# Patient Record
Sex: FEMALE | Race: BLACK OR AFRICAN AMERICAN | NOT HISPANIC OR LATINO | Employment: OTHER | URBAN - METROPOLITAN AREA
[De-identification: names, ages, dates, MRNs, and addresses within clinical notes are randomized per-mention and may not be internally consistent; named-entity substitution may affect disease eponyms.]

---

## 2017-08-04 ENCOUNTER — PATIENT OUTREACH (OUTPATIENT)
Dept: ADMINISTRATIVE | Facility: HOSPITAL | Age: 62
End: 2017-08-04

## 2017-08-04 NOTE — LETTER
August 4, 2017    Kellie Beckett  [address]  [place] [address]             [facility]  [address]  [facility] [address]  Phone: [phone] Dear Kellie Beckett     In the spirit of maintaining your good health, our system indicates that you have not been seen in the office in over 12 months and due for a visit.     Our system indicates that you are due for the following:   Health Maintenance Due   Topic Date Due    Zoster Vaccine  02/12/2015    Mammogram  09/12/2015    Pap Smear with HPV Cotest  07/31/2016    Influenza Vaccine  08/01/2017       Shawna Angel MD would like you to schedule an appointment for an annual exam at your earliest convenience. If you have completed any of these health maintenance requirements at an outside facility, please contact our office so we may update your health record.    If your PRIMARY CARE PHSICIAN has changed please contact your insurance company to reflect the correct physician.    If you have any issues or need assistance in scheduling this appointment, please call. Thank you for choosing [facility] for all your health care needs.     Pretty PATEL LPN Care Coordinator  [facility]  [phone]

## 2019-05-28 ENCOUNTER — PATIENT OUTREACH (OUTPATIENT)
Dept: ADMINISTRATIVE | Facility: HOSPITAL | Age: 64
End: 2019-05-28

## 2019-06-11 ENCOUNTER — OFFICE VISIT (OUTPATIENT)
Dept: INTERNAL MEDICINE | Facility: CLINIC | Age: 64
End: 2019-06-11
Payer: COMMERCIAL

## 2019-06-11 VITALS
SYSTOLIC BLOOD PRESSURE: 135 MMHG | HEART RATE: 81 BPM | OXYGEN SATURATION: 98 % | BODY MASS INDEX: 49.01 KG/M2 | TEMPERATURE: 98 F | DIASTOLIC BLOOD PRESSURE: 82 MMHG | WEIGHT: 285.5 LBS

## 2019-06-11 DIAGNOSIS — D64.9 ANEMIA, UNSPECIFIED TYPE: ICD-10-CM

## 2019-06-11 DIAGNOSIS — I10 HYPERTENSION, UNSPECIFIED TYPE: ICD-10-CM

## 2019-06-11 DIAGNOSIS — Z00.00 ROUTINE GENERAL MEDICAL EXAMINATION AT A HEALTH CARE FACILITY: Primary | ICD-10-CM

## 2019-06-11 DIAGNOSIS — E66.01 MORBID OBESITY WITH BMI OF 40.0-44.9, ADULT: ICD-10-CM

## 2019-06-11 DIAGNOSIS — H40.1190 CHRONIC OPEN ANGLE GLAUCOMA, UNSPECIFIED GLAUCOMA STAGE, UNSPECIFIED LATERALITY: ICD-10-CM

## 2019-06-11 DIAGNOSIS — Z98.84 HISTORY OF GASTRIC BYPASS: ICD-10-CM

## 2019-06-11 PROCEDURE — 3079F PR MOST RECENT DIASTOLIC BLOOD PRESSURE 80-89 MM HG: ICD-10-PCS | Mod: CPTII,S$GLB,, | Performed by: INTERNAL MEDICINE

## 2019-06-11 PROCEDURE — 3075F SYST BP GE 130 - 139MM HG: CPT | Mod: CPTII,S$GLB,, | Performed by: INTERNAL MEDICINE

## 2019-06-11 PROCEDURE — 99386 PREV VISIT NEW AGE 40-64: CPT | Mod: S$GLB,,, | Performed by: INTERNAL MEDICINE

## 2019-06-11 PROCEDURE — 99386 PR PREVENTIVE VISIT,NEW,40-64: ICD-10-PCS | Mod: S$GLB,,, | Performed by: INTERNAL MEDICINE

## 2019-06-11 PROCEDURE — 99999 PR PBB SHADOW E&M-EST. PATIENT-LVL IV: ICD-10-PCS | Mod: PBBFAC,,, | Performed by: INTERNAL MEDICINE

## 2019-06-11 PROCEDURE — 99999 PR PBB SHADOW E&M-EST. PATIENT-LVL IV: CPT | Mod: PBBFAC,,, | Performed by: INTERNAL MEDICINE

## 2019-06-11 PROCEDURE — 3079F DIAST BP 80-89 MM HG: CPT | Mod: CPTII,S$GLB,, | Performed by: INTERNAL MEDICINE

## 2019-06-11 PROCEDURE — 3075F PR MOST RECENT SYSTOLIC BLOOD PRESS GE 130-139MM HG: ICD-10-PCS | Mod: CPTII,S$GLB,, | Performed by: INTERNAL MEDICINE

## 2019-06-11 RX ORDER — GLUC/MSM/COLGN2/HYAL/ANTIARTH3 375-375-20
1 TABLET ORAL 2 TIMES DAILY
COMMUNITY
Start: 2015-11-04 | End: 2019-12-05

## 2019-06-11 RX ORDER — BIOTIN 1 MG
1000 TABLET ORAL
COMMUNITY
End: 2019-06-11

## 2019-06-11 RX ORDER — HYDROCHLOROTHIAZIDE 12.5 MG/1
TABLET ORAL
Qty: 90 TABLET | Refills: 3 | Status: SHIPPED | OUTPATIENT
Start: 2019-06-11 | End: 2019-12-05

## 2019-06-11 RX ORDER — MAGNESIUM GLUCONATE 27.5 (500)
500 TABLET ORAL
COMMUNITY
End: 2019-06-11

## 2019-06-11 RX ORDER — HYDROCHLOROTHIAZIDE 12.5 MG/1
TABLET ORAL
Refills: 1 | COMMUNITY
Start: 2019-04-25 | End: 2019-06-11 | Stop reason: SDUPTHER

## 2019-06-11 RX ORDER — MAGNESIUM GLUCONATE 27.5 (500)
1 TABLET ORAL DAILY
COMMUNITY
End: 2020-03-12

## 2019-06-13 ENCOUNTER — LAB VISIT (OUTPATIENT)
Dept: LAB | Facility: HOSPITAL | Age: 64
End: 2019-06-13
Attending: INTERNAL MEDICINE
Payer: COMMERCIAL

## 2019-06-13 DIAGNOSIS — Z00.00 ROUTINE GENERAL MEDICAL EXAMINATION AT A HEALTH CARE FACILITY: ICD-10-CM

## 2019-06-13 LAB
25(OH)D3+25(OH)D2 SERPL-MCNC: 30 NG/ML (ref 30–96)
ALBUMIN SERPL BCP-MCNC: 3.6 G/DL (ref 3.5–5.2)
ALP SERPL-CCNC: 125 U/L (ref 55–135)
ALT SERPL W/O P-5'-P-CCNC: 22 U/L (ref 10–44)
ANION GAP SERPL CALC-SCNC: 10 MMOL/L (ref 8–16)
AST SERPL-CCNC: 20 U/L (ref 10–40)
BASOPHILS # BLD AUTO: 0.05 K/UL (ref 0–0.2)
BASOPHILS NFR BLD: 0.8 % (ref 0–1.9)
BILIRUB SERPL-MCNC: 0.5 MG/DL (ref 0.1–1)
BUN SERPL-MCNC: 21 MG/DL (ref 8–23)
CALCIUM SERPL-MCNC: 10 MG/DL (ref 8.7–10.5)
CHLORIDE SERPL-SCNC: 101 MMOL/L (ref 95–110)
CHOLEST SERPL-MCNC: 183 MG/DL (ref 120–199)
CHOLEST/HDLC SERPL: 1.9 {RATIO} (ref 2–5)
CO2 SERPL-SCNC: 28 MMOL/L (ref 23–29)
CREAT SERPL-MCNC: 0.7 MG/DL (ref 0.5–1.4)
DIFFERENTIAL METHOD: ABNORMAL
EOSINOPHIL # BLD AUTO: 0.2 K/UL (ref 0–0.5)
EOSINOPHIL NFR BLD: 3.6 % (ref 0–8)
ERYTHROCYTE [DISTWIDTH] IN BLOOD BY AUTOMATED COUNT: 12.4 % (ref 11.5–14.5)
EST. GFR  (AFRICAN AMERICAN): >60 ML/MIN/1.73 M^2
EST. GFR  (NON AFRICAN AMERICAN): >60 ML/MIN/1.73 M^2
GLUCOSE SERPL-MCNC: 91 MG/DL (ref 70–110)
HCT VFR BLD AUTO: 40.3 % (ref 37–48.5)
HDLC SERPL-MCNC: 95 MG/DL (ref 40–75)
HDLC SERPL: 51.9 % (ref 20–50)
HGB BLD-MCNC: 12.3 G/DL (ref 12–16)
IMM GRANULOCYTES # BLD AUTO: 0.02 K/UL (ref 0–0.04)
IMM GRANULOCYTES NFR BLD AUTO: 0.3 % (ref 0–0.5)
LDLC SERPL CALC-MCNC: 78.4 MG/DL (ref 63–159)
LYMPHOCYTES # BLD AUTO: 0.8 K/UL (ref 1–4.8)
LYMPHOCYTES NFR BLD: 12.6 % (ref 18–48)
MCH RBC QN AUTO: 29.1 PG (ref 27–31)
MCHC RBC AUTO-ENTMCNC: 30.5 G/DL (ref 32–36)
MCV RBC AUTO: 95 FL (ref 82–98)
MONOCYTES # BLD AUTO: 0.6 K/UL (ref 0.3–1)
MONOCYTES NFR BLD: 9.5 % (ref 4–15)
NEUTROPHILS # BLD AUTO: 4.6 K/UL (ref 1.8–7.7)
NEUTROPHILS NFR BLD: 73.2 % (ref 38–73)
NONHDLC SERPL-MCNC: 88 MG/DL
NRBC BLD-RTO: 0 /100 WBC
PLATELET # BLD AUTO: 357 K/UL (ref 150–350)
PMV BLD AUTO: 10.9 FL (ref 9.2–12.9)
POTASSIUM SERPL-SCNC: 4.2 MMOL/L (ref 3.5–5.1)
PROT SERPL-MCNC: 7.6 G/DL (ref 6–8.4)
RBC # BLD AUTO: 4.23 M/UL (ref 4–5.4)
SODIUM SERPL-SCNC: 139 MMOL/L (ref 136–145)
TRIGL SERPL-MCNC: 48 MG/DL (ref 30–150)
TSH SERPL DL<=0.005 MIU/L-ACNC: 0.79 UIU/ML (ref 0.4–4)
VIT B12 SERPL-MCNC: 1411 PG/ML (ref 210–950)
WBC # BLD AUTO: 6.34 K/UL (ref 3.9–12.7)

## 2019-06-13 PROCEDURE — 84443 ASSAY THYROID STIM HORMONE: CPT

## 2019-06-13 PROCEDURE — 85025 COMPLETE CBC W/AUTO DIFF WBC: CPT

## 2019-06-13 PROCEDURE — 83036 HEMOGLOBIN GLYCOSYLATED A1C: CPT

## 2019-06-13 PROCEDURE — 82607 VITAMIN B-12: CPT

## 2019-06-13 PROCEDURE — 36415 COLL VENOUS BLD VENIPUNCTURE: CPT

## 2019-06-13 PROCEDURE — 82306 VITAMIN D 25 HYDROXY: CPT

## 2019-06-13 PROCEDURE — 80053 COMPREHEN METABOLIC PANEL: CPT

## 2019-06-13 PROCEDURE — 80061 LIPID PANEL: CPT

## 2019-06-13 NOTE — PROGRESS NOTES
Subjective:      Patient ID: Kellie Beckett is a 64 y.o. female.    Chief Complaint: Establish Care    HPI   65 yo with   Patient Active Problem List   Diagnosis    Anemia    Hypertension    COAG (chronic open-angle glaucoma)    Morbid obesity with BMI of 40.0-44.9, adult    History of gastric bypass     Past Medical History:   Diagnosis Date    Anemia     iron and b12 deficiency    Glaucoma     Hypertension      Here today for annual prev exam.  Compliant with meds without significant side effects. Energy and appetite are good.     Past Surgical History:   Procedure Laterality Date    GASTRIC BYPASS  2004    GLAUCOMA SURGERY      right eye     Social History     Socioeconomic History    Marital status:      Spouse name: Not on file    Number of children: 0    Years of education: Not on file    Highest education level: Not on file   Occupational History    Not on file   Social Needs    Financial resource strain: Not hard at all    Food insecurity:     Worry: Never true     Inability: Never true    Transportation needs:     Medical: No     Non-medical: No   Tobacco Use    Smoking status: Never Smoker   Substance and Sexual Activity    Alcohol use: No     Frequency: Never     Binge frequency: Never    Drug use: No    Sexual activity: Never   Lifestyle    Physical activity:     Days per week: 0 days     Minutes per session: Not on file    Stress: Not at all   Relationships    Social connections:     Talks on phone: More than three times a week     Gets together: More than three times a week     Attends [employer] service: Not on file     Active member of club or organization: Yes     Attends meetings of clubs or organizations: More than 4 times per year     Relationship status:    Other Topics Concern    Not on file   Social History Narrative    Live alone     family history includes Cancer in her maternal aunt and mother; Cataracts in her father and mother; Diabetes in her  father; Glaucoma in her father; Heart disease in her father; Hypertension in her father.    Review of Systems   Constitutional: Negative for activity change and unexpected weight change.   HENT: Negative for hearing loss, rhinorrhea and trouble swallowing.    Eyes: Negative for discharge and visual disturbance.   Respiratory: Negative for chest tightness and wheezing.    Cardiovascular: Negative for chest pain and palpitations.   Gastrointestinal: Negative for blood in stool, constipation, diarrhea and vomiting.   Endocrine: Negative for polydipsia and polyuria.   Genitourinary: Negative for difficulty urinating, dysuria, hematuria and menstrual problem.   Musculoskeletal: Positive for arthralgias. Negative for joint swelling and neck pain.   Neurological: Negative for headaches.   Psychiatric/Behavioral: Negative for confusion and dysphoric mood.     Objective:   /82 (BP Location: Left arm, Patient Position: Sitting, BP Method: Large (Automatic))   Pulse 81   Temp 98.1 °F (36.7 °C) (Tympanic)   Wt 129.5 kg (285 lb 7.9 oz)   SpO2 98%   BMI 49.01 kg/m²     Physical Exam   Constitutional: She is oriented to person, place, and time. She appears well-developed and well-nourished. No distress.   HENT:   Head: Normocephalic and atraumatic.   Mouth/Throat: Oropharynx is clear and moist.   Eyes: Pupils are equal, round, and reactive to light. EOM are normal.   Neck: Neck supple. No thyromegaly present.   Cardiovascular: Normal rate and regular rhythm.   Pulmonary/Chest: Breath sounds normal. She has no wheezes. She has no rales.   Abdominal: Soft. Bowel sounds are normal. There is no tenderness.   Musculoskeletal: She exhibits no edema.   Lymphadenopathy:     She has no cervical adenopathy.   Neurological: She is alert and oriented to person, place, and time.   Skin: Skin is warm and dry.   Psychiatric: She has a normal mood and affect. Her behavior is normal.       Assessment:     1. Routine general medical  examination at a health care facility    2. History of gastric bypass    3. Hypertension, unspecified type    4. Morbid obesity with BMI of 40.0-44.9, adult    5. Chronic open angle glaucoma, unspecified glaucoma stage, unspecified laterality    6. Anemia, unspecified type      Plan:   Routine general medical examination at a health care facility  -     Comprehensive metabolic panel; Future; Expected date: 06/11/2019  -     CBC auto differential; Future; Expected date: 06/11/2019  -     TSH; Future; Expected date: 06/11/2019  -     Lipid panel; Future; Expected date: 06/11/2019  -     Vitamin D; Future; Expected date: 06/11/2019  -     Hemoglobin A1c; Future; Expected date: 06/11/2019  -     hydroCHLOROthiazide (HYDRODIURIL) 12.5 MG Tab; TK 1 T PO QD IN THE MORNING  Dispense: 90 tablet; Refill: 3  -     Ambulatory Referral to Gynecology  -     Vitamin B12; Future; Expected date: 06/11/2019  Heart healthy diet and reg exercise  HM reviewed    History of gastric bypass  Check above. Cont vit b12    Hypertension, unspecified type  Controlled  Cont current med    Morbid obesity with BMI of 40.0-44.9, adult  D and e as discussed  Chronic open angle glaucoma, unspecified glaucoma stage, unspecified laterality    Anemia, unspecified type  Check above      Lab Frequency Next Occurrence   Comprehensive metabolic panel Once 06/11/2019   CBC auto differential Once 06/11/2019   TSH Once 06/11/2019   Lipid panel Once 06/11/2019   Vitamin D Once 06/11/2019   Hemoglobin A1c Once 06/11/2019   Vitamin B12 Once 06/11/2019       Problem List Items Addressed This Visit        Ophtho    COAG (chronic open-angle glaucoma)       Cardiac/Vascular    Hypertension       Oncology    Anemia    Overview     iron and b12 deficiency  Pt reports on b12 since gastric bypass, but no h/o actual low b12 reading            Endocrine    Morbid obesity with BMI of 40.0-44.9, adult    History of gastric bypass      Other Visit Diagnoses     Routine  general medical examination at a health care facility    -  Primary    Relevant Medications    hydroCHLOROthiazide (HYDRODIURIL) 12.5 MG Tab    Other Relevant Orders    Comprehensive metabolic panel    CBC auto differential    TSH    Lipid panel    Vitamin D    Hemoglobin A1c    Ambulatory Referral to Gynecology    Vitamin B12          Follow up in about 6 months (around 12/11/2019), or if symptoms worsen or fail to improve.

## 2019-06-14 LAB
ESTIMATED AVG GLUCOSE: 97 MG/DL (ref 68–131)
HBA1C MFR BLD HPLC: 5 % (ref 4–5.6)

## 2019-06-17 ENCOUNTER — TELEPHONE (OUTPATIENT)
Dept: INTERNAL MEDICINE | Facility: CLINIC | Age: 64
End: 2019-06-17

## 2019-06-17 NOTE — TELEPHONE ENCOUNTER
Conveyed results and recommendation to pt who verbalized understanding and scheduled appt with Dr. Persaud for 6/18/19.

## 2019-06-17 NOTE — TELEPHONE ENCOUNTER
Cholesterol and other risk factors indicate an elevated risk of cardiovascular disease. Please ask patient to see me in clinic to discuss further testing versus appropriate treatment.  Dr. Persaud

## 2019-06-18 ENCOUNTER — OFFICE VISIT (OUTPATIENT)
Dept: INTERNAL MEDICINE | Facility: CLINIC | Age: 64
End: 2019-06-18
Payer: COMMERCIAL

## 2019-06-18 VITALS
DIASTOLIC BLOOD PRESSURE: 88 MMHG | WEIGHT: 285.06 LBS | SYSTOLIC BLOOD PRESSURE: 128 MMHG | OXYGEN SATURATION: 98 % | BODY MASS INDEX: 48.93 KG/M2 | TEMPERATURE: 97 F | HEART RATE: 74 BPM

## 2019-06-18 DIAGNOSIS — M25.552 CHRONIC LEFT HIP PAIN: ICD-10-CM

## 2019-06-18 DIAGNOSIS — G89.29 CHRONIC LEFT HIP PAIN: ICD-10-CM

## 2019-06-18 DIAGNOSIS — G89.29 CHRONIC PAIN OF BOTH KNEES: ICD-10-CM

## 2019-06-18 DIAGNOSIS — Z91.89 AT RISK FOR CORONARY ARTERY DISEASE: Primary | ICD-10-CM

## 2019-06-18 DIAGNOSIS — M25.562 CHRONIC PAIN OF BOTH KNEES: ICD-10-CM

## 2019-06-18 DIAGNOSIS — M25.561 CHRONIC PAIN OF BOTH KNEES: ICD-10-CM

## 2019-06-18 PROCEDURE — 99999 PR PBB SHADOW E&M-EST. PATIENT-LVL IV: ICD-10-PCS | Mod: PBBFAC,,, | Performed by: INTERNAL MEDICINE

## 2019-06-18 PROCEDURE — 3074F SYST BP LT 130 MM HG: CPT | Mod: CPTII,S$GLB,, | Performed by: INTERNAL MEDICINE

## 2019-06-18 PROCEDURE — 99999 PR PBB SHADOW E&M-EST. PATIENT-LVL IV: CPT | Mod: PBBFAC,,, | Performed by: INTERNAL MEDICINE

## 2019-06-18 PROCEDURE — 3079F DIAST BP 80-89 MM HG: CPT | Mod: CPTII,S$GLB,, | Performed by: INTERNAL MEDICINE

## 2019-06-18 PROCEDURE — 99213 OFFICE O/P EST LOW 20 MIN: CPT | Mod: S$GLB,,, | Performed by: INTERNAL MEDICINE

## 2019-06-18 PROCEDURE — 3008F BODY MASS INDEX DOCD: CPT | Mod: CPTII,S$GLB,, | Performed by: INTERNAL MEDICINE

## 2019-06-18 PROCEDURE — 99213 PR OFFICE/OUTPT VISIT, EST, LEVL III, 20-29 MIN: ICD-10-PCS | Mod: S$GLB,,, | Performed by: INTERNAL MEDICINE

## 2019-06-18 PROCEDURE — 3079F PR MOST RECENT DIASTOLIC BLOOD PRESSURE 80-89 MM HG: ICD-10-PCS | Mod: CPTII,S$GLB,, | Performed by: INTERNAL MEDICINE

## 2019-06-18 PROCEDURE — 3074F PR MOST RECENT SYSTOLIC BLOOD PRESSURE < 130 MM HG: ICD-10-PCS | Mod: CPTII,S$GLB,, | Performed by: INTERNAL MEDICINE

## 2019-06-18 PROCEDURE — 3008F PR BODY MASS INDEX (BMI) DOCUMENTED: ICD-10-PCS | Mod: CPTII,S$GLB,, | Performed by: INTERNAL MEDICINE

## 2019-06-18 RX ORDER — NAPROXEN SODIUM 220 MG
220 TABLET ORAL
COMMUNITY

## 2019-06-18 RX ORDER — ACETAMINOPHEN 325 MG/1
325 TABLET ORAL
COMMUNITY

## 2019-06-18 NOTE — PROGRESS NOTES
Subjective:      Patient ID: Kellie Beckett is a 64 y.o. female.    Chief Complaint: discuss lab results    HPI     63 yo with   Patient Active Problem List   Diagnosis    Anemia    Hypertension    COAG (chronic open-angle glaucoma)    Morbid obesity with BMI of 40.0-44.9, adult    History of gastric bypass    At risk for coronary artery disease     Past Medical History:   Diagnosis Date    Anemia     iron and b12 deficiency    Glaucoma     Hypertension      Here today for follow-up on lab results and management of elevated cardiovascular disease risk.  This is a new issue for her.  She has not had previous medications for this.  She also reports that she has had chronic hip and knee pain.  She is trying to avoid surgery.  She has had physical therapy which has improved in the past.  She is wondering about water therapy.    efamily history includes Cancer in her maternal aunt and mother; Cataracts in her father and mother; Diabetes in her father; Glaucoma in her father; Heart disease in her father; Hypertension in her father.    Review of Systems   Constitutional: Negative for activity change and unexpected weight change.   HENT: Negative for hearing loss, rhinorrhea and trouble swallowing.    Eyes: Negative for discharge and visual disturbance.   Respiratory: Negative for wheezing.    Cardiovascular: Negative for chest pain and palpitations.   Gastrointestinal: Negative for blood in stool, constipation, diarrhea and vomiting.   Endocrine: Negative for polydipsia and polyuria.   Genitourinary: Negative for difficulty urinating, dysuria, hematuria and menstrual problem.   Musculoskeletal: Positive for arthralgias. Negative for joint swelling and neck pain.   Neurological: Negative for weakness and headaches.   Psychiatric/Behavioral: Negative for confusion and dysphoric mood.     Objective:   /88 (BP Location: Right arm, Patient Position: Sitting, BP Method: Large (Manual))   Pulse 74   Temp 97.2  °F (36.2 °C) (Tympanic)   Wt 129.3 kg (285 lb 0.9 oz)   SpO2 98%   BMI 48.93 kg/m²     Physical Exam   Constitutional: She appears well-developed and well-nourished. No distress.   Cardiovascular: Normal rate.   Pulmonary/Chest: Effort normal.   Neurological: She is alert.   Skin: Skin is warm and dry.   Psychiatric: She has a normal mood and affect. Her behavior is normal.       Assessment:     1. At risk for coronary artery disease    2. Chronic left hip pain    3. Chronic pain of both knees      Plan:   At risk for coronary artery disease  -     CT Cardiac Scoring; Future; Expected date: 06/18/2019  Patient is agreeable to statin if indicated based on coronary artery calcium score.  Risk and benefits of statins discussed with patient including possible muscle and liver side effects.      Chronic left hip pain  -     Ambulatory Referral to Physiatry    Chronic pain of both knees  -     Ambulatory Referral to Physiatry            Problem List Items Addressed This Visit        Cardiac/Vascular    At risk for coronary artery disease - Primary    Overview     ascvd 6.8 to 7.8, 6/2019         Relevant Orders    CT Cardiac Scoring      Other Visit Diagnoses     Chronic left hip pain        Relevant Orders    Ambulatory Referral to Physiatry    Chronic pain of both knees        Relevant Orders    Ambulatory Referral to Physiatry          Follow up if symptoms worsen or fail to improve.

## 2019-06-21 ENCOUNTER — OFFICE VISIT (OUTPATIENT)
Dept: OBSTETRICS AND GYNECOLOGY | Facility: CLINIC | Age: 64
End: 2019-06-21
Payer: COMMERCIAL

## 2019-06-21 VITALS
DIASTOLIC BLOOD PRESSURE: 80 MMHG | WEIGHT: 282.19 LBS | BODY MASS INDEX: 48.18 KG/M2 | HEIGHT: 64 IN | SYSTOLIC BLOOD PRESSURE: 120 MMHG

## 2019-06-21 DIAGNOSIS — Z01.419 ENCOUNTER FOR WELL WOMAN EXAM WITH ROUTINE GYNECOLOGICAL EXAM: Primary | ICD-10-CM

## 2019-06-21 PROBLEM — Z86.2 HISTORY OF ANEMIA: Status: ACTIVE | Noted: 2017-05-09

## 2019-06-21 PROBLEM — E55.9 VITAMIN D DEFICIENCY: Status: ACTIVE | Noted: 2017-05-09

## 2019-06-21 PROBLEM — H90.3 BILATERAL SENSORINEURAL HEARING LOSS: Status: ACTIVE | Noted: 2018-06-06

## 2019-06-21 PROCEDURE — 99386 PR PREVENTIVE VISIT,NEW,40-64: ICD-10-PCS | Mod: S$GLB,,, | Performed by: OBSTETRICS & GYNECOLOGY

## 2019-06-21 PROCEDURE — 99999 PR PBB SHADOW E&M-EST. PATIENT-LVL III: ICD-10-PCS | Mod: PBBFAC,,, | Performed by: OBSTETRICS & GYNECOLOGY

## 2019-06-21 PROCEDURE — 99999 PR PBB SHADOW E&M-EST. PATIENT-LVL III: CPT | Mod: PBBFAC,,, | Performed by: OBSTETRICS & GYNECOLOGY

## 2019-06-21 PROCEDURE — 99386 PREV VISIT NEW AGE 40-64: CPT | Mod: S$GLB,,, | Performed by: OBSTETRICS & GYNECOLOGY

## 2019-06-21 NOTE — LETTER
June 21, 2019      Hunter Persaud MD  [address] [facility]  [place] [address]           [facility]  [place]  [address] [address] [place] [address]  Phone: [phone]  Fax: [phone]          Patient: Kellie Beckett   MR Number: [id number]   YOB: 1955   Date of Visit: 6/21/2019       Dear Dr. Hunter Persaud:    Thank you for referring Kellie Beckett to me for evaluation. Attached you will find relevant portions of my assessment and plan of care.    If you have questions, please do not hesitate to call me. I look forward to following Kellie Beckett along with you.    Sincerely,    Rafaela Kowalski MD    Enclosure  CC:  No Recipients    If you would like to receive this communication electronically, please contact [email or web address] or [phone] to request more information on [facility] access.    For providers and/or their staff who would like to refer a patient to [facility], please contact us through our one-stop-shop provider referral line, [facility], at [phone].    If you feel you have received this communication in error or would no longer like to receive these types of communications, please e-mail [email or web address]

## 2019-06-21 NOTE — PROGRESS NOTES
"Subjective:      Kellie Beckett is a 64 y.o. female who presents for annual exam. The patient has no complaints today. The patient is not currently sexually active. GYN screening history: last pap: approximate date 02/2018 and was normal and last mammogram: approximate date 05/2019 and was normal. The patient is not taking hormone replacement therapy. Patient denies post-menopausal vaginal bleeding.. The patient wears seatbelts: yes. The patient participates in regular exercise: yes. Has the patient ever been transfused or tattooed?: no. The patient reports that there is not domestic violence in her life.  Last pap 2018 at [facility]. Last mammogram 2018, wnl. Patient was late 40's at menopause.  13 at menarche.  Not sexually active.  +hot flashes, +night sweats, no sleep disturbances.  No vaginal dryness, no vaginal pain.  No leaking of urine.       Menstrual History:  OB History    None        Menarche age: 13  No LMP recorded. Patient is postmenopausal.           Review of Systems  Constitutional: negative  Eyes: negative  Ears, nose, mouth, throat, and face: negative  Respiratory: negative  Cardiovascular: negative  Gastrointestinal: negative  Genitourinary:negative  Integument/breast: negative  Hematologic/lymphatic: negative  Musculoskeletal:negative  Neurological: negative  Behavioral/Psych: negative  Endocrine: negative  Allergic/Immunologic: negative      Objective:      /80 (BP Location: Right arm)   Ht 5' 4" (1.626 m)   Wt 128 kg (282 lb 3 oz)   BMI 48.44 kg/m²   General appearance: alert, appears stated age and cooperative  Head: Normocephalic, without obvious abnormality, atraumatic  Eyes: negative  Nose: no discharge  Neck: no adenopathy, no carotid bruit, no JVD, supple, symmetrical, trachea midline and thyroid not enlarged, symmetric, no tenderness/mass/nodules  Lungs: clear to auscultation bilaterally  Breasts: Inspection negative, No nipple retraction or dimpling, No nipple " discharge or bleeding, No axillary or supraclavicular adenopathy, Normal to palpation without dominant masses  Heart: S1, S2 normal and no S3 or S4  Abdomen: soft, non-tender; bowel sounds normal; no masses,  no organomegaly  Pelvic: cervix normal in appearance, external genitalia normal, no adnexal masses or tenderness, no bladder tenderness, no cervical motion tenderness, perianal skin: no external genital warts noted, rectovaginal septum normal, urethra without abnormality or discharge, uterus normal size, shape, and consistency and vagina normal without discharge  Extremities: extremities normal, atraumatic, no cyanosis or edema  Skin: Skin color, texture, turgor normal. No rashes or lesions  Neurologic: Grossly normal      Assessment:      Normal gyn exam  Menopause      Plan:      All questions answered.  Discussed healthy lifestyle modifications.

## 2019-06-21 NOTE — PATIENT INSTRUCTIONS
Prevention Guidelines, Women Ages 50 to 64  Screening tests and vaccines are an important part of managing your health. Health counseling is essential, too. Below are guidelines for these, for women ages 50 to 64. Talk with your healthcare provider to make sure youre up to date on what you need.  Screening Who needs it How often   Type 2 diabetes or prediabetes All adults beginning at age 45 and adults without symptoms at any age who are overweight or obese and have 1 or more additional risk factors for diabetes. At  least every 3 years   Alcohol misuse All women in this age group At routine exams   Blood pressure All women in this age group Every 2 years if your blood pressure is less than 120/80 mm Hg; yearly if your systolic blood pressure is 120 to 139 mm Hg, or your diastolic blood pressure reading is 80 to 89 mm Hg   Breast cancer All women in this age group Yearly mammogram and clinical breast exam1   Cervical cancer All women in this age group, except women who have had a complete hysterectomy Pap test every 3 years or Pap test with human papillomavirus (HPV) test every 5 years   Chlamydia Women at increased risk for infection At routine exams   Colorectal cancer All women in this age group Flexible sigmoidoscopy every 5 years, or colonoscopy every 10 years, or double-contrast barium enema every 5 years; yearly fecal occult blood test or fecal immunochemical test; or a stool DNA test as often as your health care provider advises; talk with your health care provider about which tests are best for you   Depression All women in this age group At routine exams   Gonorrhea Sexually active women at increased risk for infection At routine exams   Hepatitis C Anyone at increased risk; 1 time for those born between 1945 and 1965 At routine exams   High cholesterol or triglycerides All women in this age group who are at risk for coronary artery disease At least every 5 years   HIV All women At routine exams   Lung  cancer Adults age 55 to 80 who have smoked Yearly screening in smokers with 30 pack-year history of smoking or who quit within 15 years   Obesity All women in this age group At routine exams   Osteoporosis Women who are postmenopausal Ask your healthcare provider   Syphilis Women at increased risk for infection - talk with your healthcare provider At routine exams   Tuberculosis Women at increased risk for infection - talk with your healthcare provider Ask your healthcare provider   Vision All women in this age group Ask your healthcare provider   Vaccine Who needs it How often   Chickenpox (varicella) All women in this age group who have no record of this infection or vaccine 2 doses; the second dose should be given at least 4 weeks after the first dose   Hepatitis A Women at increased risk for infection - talk with your healthcare provider 2 doses given at least 6 months apart   Hepatitis B Women at increased risk for infection - talk with your healthcare provider 3 doses over 6 months; second dose should be given 1 month after the first dose; the third dose should be given at least 2 months after the second dose and at least 4 months after the first dose   Haemophilus influenzaeType B (HIB) Women at increased risk for infection - talk with your healthcare provider 1 to 3 doses   Influenza (flu) All women in this age group Once a year   Measles, mumps, rubella (MMR) Women in this age group through their late 50s who have no record of these infections or vaccines 1 dose   Meningococcal Women at increased risk for infection - talk with your healthcare provider 1 or more doses   Pneumococcal conjugate vaccine (PCV13) and pneumococcal polysaccharide vaccine (PPSV23) Women at increased risk for infection - talk with your healthcare provider PCV13: 1 dose ages 19 to 65 (protects against 13 types of pneumococcal bacteria)  PPSV23: 1 to 2 doses through age 64, or 1 dose at 65 or older (protects against 23 types of  pneumococcal bacteria)   Tetanus/diphtheria/pertussis (Td/Tdap) booster All women in this age group Td every 10 years, or a one-time dose of Tdap instead of a Td booster after age 18, then Td every 10 years   Zoster All women ages 60 and older 1 dose   Counseling Who needs it How often   BRCA gene mutation testing for breast and ovarian cancer susceptibility Women with increased risk for having gene mutation When your risk is known   Breast cancer and chemoprevention Women at high risk for breast cancer When your risk is known   Diet and exercise Women who are overweight or obese When diagnosed, and then at routine exams   Sexually transmitted infection prevention Women at increased risk for infection - talk with your healthcare provider At routine exams   Use of daily aspirin Women ages 55 and up in this age group who are at risk for cardiovascular health problems such as stroke When your risk is known   Use of tobacco and the health effects it can cause All women in this age group Every exam   1American Cancer Society  Date Last Reviewed: 1/26/2016  © 4804-1580 [employer]. [address] [place] [address], [place] [address]. All rights reserved. This information is not intended as a substitute for professional medical care. Always follow your healthcare professional's instructions.

## 2019-06-27 ENCOUNTER — TELEPHONE (OUTPATIENT)
Dept: RADIOLOGY | Facility: HOSPITAL | Age: 64
End: 2019-06-27

## 2019-06-28 ENCOUNTER — HOSPITAL ENCOUNTER (OUTPATIENT)
Dept: RADIOLOGY | Facility: HOSPITAL | Age: 64
Discharge: HOME | End: 2019-06-28
Attending: INTERNAL MEDICINE
Payer: COMMERCIAL

## 2019-06-28 DIAGNOSIS — Z91.89 AT RISK FOR CORONARY ARTERY DISEASE: ICD-10-CM

## 2019-06-28 PROCEDURE — 75571 CT HRT W/O DYE W/CA TEST: CPT | Mod: 26,,, | Performed by: RADIOLOGY

## 2019-06-28 PROCEDURE — 75571 CT HRT W/O DYE W/CA TEST: CPT | Mod: TC

## 2019-06-28 PROCEDURE — 75571 CT CALCIUM SCORING CARDIAC: ICD-10-PCS | Mod: 26,,, | Performed by: RADIOLOGY

## 2019-07-02 ENCOUNTER — TELEPHONE (OUTPATIENT)
Dept: INTERNAL MEDICINE | Facility: CLINIC | Age: 64
End: 2019-07-02

## 2019-07-02 ENCOUNTER — PATIENT MESSAGE (OUTPATIENT)
Dept: INTERNAL MEDICINE | Facility: CLINIC | Age: 64
End: 2019-07-02

## 2019-07-02 DIAGNOSIS — E78.5 HYPERLIPIDEMIA, UNSPECIFIED HYPERLIPIDEMIA TYPE: Primary | ICD-10-CM

## 2019-07-02 RX ORDER — ROSUVASTATIN CALCIUM 10 MG/1
10 TABLET, COATED ORAL DAILY
Qty: 90 TABLET | Refills: 1 | Status: SHIPPED | OUTPATIENT
Start: 2019-07-02 | End: 2019-12-13 | Stop reason: SDUPTHER

## 2019-07-04 ENCOUNTER — PATIENT MESSAGE (OUTPATIENT)
Dept: INTERNAL MEDICINE | Facility: CLINIC | Age: 64
End: 2019-07-04

## 2019-07-11 ENCOUNTER — TELEPHONE (OUTPATIENT)
Dept: PHYSICAL MEDICINE AND REHAB | Facility: CLINIC | Age: 64
End: 2019-07-11

## 2019-07-11 ENCOUNTER — PATIENT MESSAGE (OUTPATIENT)
Dept: ADMINISTRATIVE | Facility: HOSPITAL | Age: 64
End: 2019-07-11

## 2019-07-11 ENCOUNTER — PATIENT OUTREACH (OUTPATIENT)
Dept: ADMINISTRATIVE | Facility: HOSPITAL | Age: 64
End: 2019-07-11

## 2019-07-11 NOTE — TELEPHONE ENCOUNTER
----- Message from Jorge Johnston sent at 7/11/2019  1:20 PM CDT -----  Contact: Patient   Patient would like to reschedule her appointment that's for tomorrow     [phone] or [phone]

## 2019-07-11 NOTE — LETTER
2019      We are seeing Kellie Beckett,  1955, at [facility]. Hunter Persaud MD is their primary care physician. To help with our mel maintenance records could you please send the following:     PAP report 2018    Please fax to [facility] at [phone], attention CARE COORDINATION DEPARTMENT.     Thank-you in advance for your assistance. If you have any questions or concerns please feel free to call.         Iwona EISENBERG LPN  Care Coordination Department  [facility] [place] [facility]  [phone]

## 2019-07-16 ENCOUNTER — OFFICE VISIT (OUTPATIENT)
Dept: PHYSICAL MEDICINE AND REHAB | Facility: CLINIC | Age: 64
End: 2019-07-16
Payer: COMMERCIAL

## 2019-07-16 VITALS
DIASTOLIC BLOOD PRESSURE: 87 MMHG | HEIGHT: 64 IN | WEIGHT: 282.19 LBS | BODY MASS INDEX: 48.18 KG/M2 | SYSTOLIC BLOOD PRESSURE: 129 MMHG | HEART RATE: 76 BPM

## 2019-07-16 DIAGNOSIS — M54.41 CHRONIC RIGHT-SIDED LOW BACK PAIN WITH RIGHT-SIDED SCIATICA: Primary | ICD-10-CM

## 2019-07-16 DIAGNOSIS — G89.29 CHRONIC RIGHT-SIDED LOW BACK PAIN WITH RIGHT-SIDED SCIATICA: Primary | ICD-10-CM

## 2019-07-16 DIAGNOSIS — M25.551 RIGHT HIP PAIN: ICD-10-CM

## 2019-07-16 DIAGNOSIS — M17.0 PRIMARY OSTEOARTHRITIS OF BOTH KNEES: ICD-10-CM

## 2019-07-16 PROCEDURE — 99999 PR PBB SHADOW E&M-EST. PATIENT-LVL III: CPT | Mod: PBBFAC,,, | Performed by: PHYSICAL MEDICINE & REHABILITATION

## 2019-07-16 PROCEDURE — 99204 PR OFFICE/OUTPT VISIT, NEW, LEVL IV, 45-59 MIN: ICD-10-PCS | Mod: S$GLB,,, | Performed by: PHYSICAL MEDICINE & REHABILITATION

## 2019-07-16 PROCEDURE — 3079F PR MOST RECENT DIASTOLIC BLOOD PRESSURE 80-89 MM HG: ICD-10-PCS | Mod: CPTII,S$GLB,, | Performed by: PHYSICAL MEDICINE & REHABILITATION

## 2019-07-16 PROCEDURE — 3079F DIAST BP 80-89 MM HG: CPT | Mod: CPTII,S$GLB,, | Performed by: PHYSICAL MEDICINE & REHABILITATION

## 2019-07-16 PROCEDURE — 99204 OFFICE O/P NEW MOD 45 MIN: CPT | Mod: S$GLB,,, | Performed by: PHYSICAL MEDICINE & REHABILITATION

## 2019-07-16 PROCEDURE — 99999 PR PBB SHADOW E&M-EST. PATIENT-LVL III: ICD-10-PCS | Mod: PBBFAC,,, | Performed by: PHYSICAL MEDICINE & REHABILITATION

## 2019-07-16 PROCEDURE — 3008F PR BODY MASS INDEX (BMI) DOCUMENTED: ICD-10-PCS | Mod: CPTII,S$GLB,, | Performed by: PHYSICAL MEDICINE & REHABILITATION

## 2019-07-16 PROCEDURE — 3074F PR MOST RECENT SYSTOLIC BLOOD PRESSURE < 130 MM HG: ICD-10-PCS | Mod: CPTII,S$GLB,, | Performed by: PHYSICAL MEDICINE & REHABILITATION

## 2019-07-16 PROCEDURE — 3008F BODY MASS INDEX DOCD: CPT | Mod: CPTII,S$GLB,, | Performed by: PHYSICAL MEDICINE & REHABILITATION

## 2019-07-16 PROCEDURE — 3074F SYST BP LT 130 MM HG: CPT | Mod: CPTII,S$GLB,, | Performed by: PHYSICAL MEDICINE & REHABILITATION

## 2019-07-16 NOTE — PROGRESS NOTES
PM&R NEW PATIENT HISTORY & PHYSICAL:    Referring Physician: Hunter Persaud MD    Chief Complaint   Patient presents with    Hip Pain     Right hip pain    Knee Pain     Bilateral knee pain       HPI: This is a 64 y.o.  female being seen in clinic today for evaluation of Hip Pain (Right hip pain) and Knee Pain (Bilateral knee pain)   The problem first began around 2015 when she suddenly had trouble walking, had severe pain in right buttock when trying to lift right leg. Had improvement with therapy that lasted several months, but gradually came back. Weakness led to falling, leading to knee pain, leading to seeing ortho at [facility]. Has had multiple knee and hip injections but no more PT over the last few years. She feels throbbing pain in her right buttocks. The symptoms are intermittent. She has tried many injections and OTC meds with temporary improvement. She has tried physical therapy, but last was in 2017. She is interested in aquatic therapy. She lives nearby and is interested in doing PT here.     History obtained from patient.    Past family, medical, social, surgical history, and vital signs reviewed in chart.    Review of Systems   Constitutional: Negative for chills, fever and weight loss.   HENT: Negative for hearing loss and sore throat.    Eyes: Negative for blurred vision, photophobia and pain.   Respiratory: Negative for shortness of breath.    Cardiovascular: Negative for chest pain.   Gastrointestinal: Negative for abdominal pain.   Genitourinary: Negative for dysuria.   Skin: Negative for rash.   Neurological: Negative for tingling and headaches.   Endo/Heme/Allergies: Does not bruise/bleed easily.   Psychiatric/Behavioral: Negative for depression.       Physical Exam   Constitutional: She is oriented to person, place, and time. She appears well-developed and well-nourished.   HENT:   Head: Normocephalic and atraumatic.   Eyes: Pupils are equal, round, and reactive to light. EOM are normal.    Neck: Normal range of motion. Neck supple.   Cardiovascular: Intact distal pulses.   Pulmonary/Chest: Effort normal.   Abdominal: She exhibits no distension.   Neurological: She is alert and oriented to person, place, and time. She has normal strength and normal reflexes. No sensory deficit.   Skin: Skin is warm and dry.   Psychiatric: She has a normal mood and affect.   Vitals reviewed.      IMPRESSION/PLAN: Kellie is a 64 y.o.  female with:    1. Chronic right-sided low back pain with right-sided sciatica  - Ambulatory referral to Physical Therapy    2. Primary osteoarthritis of both knees  - Ambulatory referral to Physical Therapy    3. Right hip pain  - Ambulatory referral to Physical Therapy     The findings were discussed with Kellie in detail. I agree with her that she can benefit from PT. She'll come here and do aquatic and land based PT. I also gave her a simple HEP to work on. We discussed her weight and she is working on it, already losing 10 pounds with one month on Weight Watchers. I think much of her 'hip' pain is actually coming from the back. All of her questions were answered. She was provided this plan in writing. She will follow-up with me in 6 - 8 weeks, or after approximately 4 weeks of therapy.     Vikki Hamlin M.D.  Physical Medicine and Rehab

## 2019-07-16 NOTE — PATIENT INSTRUCTIONS
Working through these progressions:    Unless otherwise stated, you should only be doing one exercise from each progression listed below. These are listed in order of difficulty, so start with the first one in each list. Do as many reps as you can while maintaining excellent form. Stop doing the exercise if you fatigue or can't maintain proper form. Once you can do the recommended amount of reps for that exercise, stop doing that one and move on to the next exercise in that progression list during your next workout.     Bridge Progression: The purpose is to practice pushing the leg back using the glutes while maintaining a neutral spine. Start with your foot close enough to touch the heel. Brace your core without letting your back arch or flatten, or rotate for single leg exercises. Squeeze the glutes and drive down through the heel. If you feel tightness in the low back, you need to either brace the core more, use the glutes more, or don't lift the hips quite as high. Arms down is easier, arms up is harder.     1. Double leg bridge with arms up: 3 x 10 - 20        2. Bridge march with arms up: 3 x 20 Don't allow the hips to rotate.           Hip Abduction Progression:      Lateral control - This is one of the most common problems with runners and can contribute to many different injuries and wasted energy. Improving hip strength will help significantly. As your hips get stronger, think about actively squeezing your glutes when you run - that will help get these muscles firing. Do only 1 of these exercises per workout and advance to the next exercise once you can do the recommended amount of reps.     1. Clam shells: 3 x 30  Stay perpendicular to ground, knees bent 90 degrees, feet lined up even with low back. Brace core & squeeze glutes throughout the exercise. Lift top knee only as far as you can without rolling backwards.           2. Side lying leg lift (keep leg slightly extended): 3 x 15        Intermediate  "Glute Max Progression:     With these exercises:  - continue to focus on abdominal bracing  - think about using your foot muscles (toe yoga) to prevent the foot from collapsing in  - keep the middle of the kneecap lined up over the second toe  - good control is more important than the speed of the exercise or how many you do!  - OK to do more than one of these at a time, if your glutes can handle it.    1. Chair of Death squats: 3 x 15   Drive hips back, keeping tibia as vertical as possible. The bar Im holding is to remind me not to flex my spine on the way down, or arch my back on the way up. Think "sit back" rather than "squat down".        Hip Flexor Strengthening Progression:    1. Isometric hip flexion: 3 x 30 seconds          Toe Yoga - The purpose is to give you a smarter, stronger, and more stable foot. The big toe accounts for about 85% of our stability, get it stronger! Start with coordination:  1. Keeping little toes down, lift the big toe.  2. Put big toe down, lift the small toes.  3. Alternate for 20 - 30 seconds before taking a break.  4. Progress from doing this sitting to standing to single leg stance.        For Foot Strength:  1. Pick the little toes up, drive the big toe down without curling it.  2. Gradually hold this squeezing for longer and longer.  3. Again progress from sitting to standing to single leg stance.  4. Start incorporating this with standing exercises like squats and deadlifts and all single leg work.     For a good primer, check out this link - Are You Ready to go Minimal: https://www.remocean.com/watch?v=YtICeFOKjIs            "

## 2019-07-16 NOTE — LETTER
July 16, 2019      Hunter Persaud MD  [address]  [place] [address]           [facility]  [address]  [place] [address]  Phone: [phone]  Fax: [phone]          Patient: Kellie Beckett   MR Number: [id number]   YOB: 1955   Date of Visit: 7/16/2019       Dear Dr. Hunter Persaud:    Thank you for referring Kellie Beckett to me for evaluation. Attached you will find relevant portions of my assessment and plan of care.    If you have questions, please do not hesitate to call me. I look forward to following Kellie Beckett along with you.    Sincerely,    Vikki Hamlin MD    Enclosure  CC:  No Recipients    If you would like to receive this communication electronically, please contact [email or web address] or [phone] to request more information on [facility] access.    For providers and/or their staff who would like to refer a patient to [facility], please contact us through our one-stop-shop provider referral line, [facility], at [phone].    If you feel you have received this communication in error or would no longer like to receive these types of communications, please e-mail [email or web address]

## 2019-08-05 ENCOUNTER — CLINICAL SUPPORT (OUTPATIENT)
Dept: REHABILITATION | Facility: HOSPITAL | Age: 64
End: 2019-08-05
Payer: COMMERCIAL

## 2019-08-05 DIAGNOSIS — M62.81 MUSCLE WEAKNESS (GENERALIZED): ICD-10-CM

## 2019-08-05 DIAGNOSIS — R26.89 POOR BALANCE: ICD-10-CM

## 2019-08-05 DIAGNOSIS — R26.89 IMPAIRED GAIT AND MOBILITY: ICD-10-CM

## 2019-08-05 PROCEDURE — 97161 PT EVAL LOW COMPLEX 20 MIN: CPT | Performed by: PHYSICAL THERAPIST
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
## 2019-08-08 ENCOUNTER — CLINICAL SUPPORT (OUTPATIENT)
Dept: REHABILITATION | Facility: HOSPITAL | Age: 64
End: 2019-08-08
Payer: COMMERCIAL

## 2019-08-08 DIAGNOSIS — R26.89 IMPAIRED GAIT AND MOBILITY: ICD-10-CM

## 2019-08-08 DIAGNOSIS — M62.81 MUSCLE WEAKNESS (GENERALIZED): ICD-10-CM

## 2019-08-08 DIAGNOSIS — R26.89 POOR BALANCE: ICD-10-CM

## 2019-08-08 PROCEDURE — 97113 AQUATIC THERAPY/EXERCISES: CPT | Performed by: PHYSICAL THERAPIST

## 2019-08-08 NOTE — PROGRESS NOTES
Physical Therapy Daily Treatment Note     Name: Kellie Beckett  Clinic Number: [id number]    Therapy Diagnosis:   Encounter Diagnoses   Name Primary?    Muscle weakness (generalized)     Impaired gait and mobility     Poor balance      Physician: Vikki Hamlin MD    Visit Date: 8/8/2019    Physician Orders: PT Eval and Treat; Schedule for aquatic therapy and also progress and educate on land based HEP. Needs general strengthening and lower quarter stability.  Medical Diagnosis from Referral:   M54.41,G89.29 (ICD-10-CM) - Chronic right-sided low back pain with right-sided sciatica   M17.0 (ICD-10-CM) - Primary osteoarthritis of both knees   M25.551 (ICD-10-CM) - Right hip pain      Evaluation Date: 8/5/2019  Authorization Period Expiration: 7/15/2020  Plan of Care Expiration: 10/28/19  Visit # / Visits authorized: 2/ 36    Time In: 12:00 pm   Time Out: 12:52 pm   Total Billable Time: 45 minutes    Precautions: Standard    Subjective     Pt reports: she is ready to get in the pool.    She was compliant with home exercise program.  Response to previous treatment: good   Functional change: NA    Pain: 2/10  Location: right knee and R hip       Objective     Juventino received aquatic therapy with the use of water to decrease the pull of gravity and weightbearing forces on the body to increase tolerance to resisted therex to develop strength, endurance, ROM, flexibility, posture and core stabilization for 45 minutes including  Treadmill ambulation: Forward, 0.4 mph, 3 minutes  Treadmill ambulation: Sidestepping, 0.4 mph, 3 minutes each     Standing Hamstring Curls, alternating B LE 2 minutes  Standing alternating Marching, B LE 2 minutes; with UE support   Standing hip flex/ext/abd, B LE, 2 x 10   Alternating heel/toe raises, 2 minutes; with UE support     Push/pull with kickboard, 2:00 on 2nd level for increased WB and balance  Single leg bicycle, alternating with patient resting on side of the steps, 3:00       Home  Exercises Provided and Patient Education Provided     Education provided:       Written Home Exercises Provided: no.  Exercises were reviewed and Juventino was able to demonstrate them prior to the end of the session.  Juventino demonstrated NA understanding of the education provided.     See EMR under NA for exercises provided NA.    Assessment     Juventino tolerated her first aquatic session fair with some apprehension in the deeper water level.  She reported she felt good after session.    Juventino is progressing well towards her goals.   Pt prognosis is Good.     Pt will continue to benefit from skilled outpatient physical therapy to address the deficits listed in the problem list box on initial evaluation, provide pt/family education and to maximize pt's level of independence in the home and community environment.     Pt's spiritual, cultural and educational needs considered and pt agreeable to plan of care and goals.     Anticipated barriers to physical therapy: co-morbidities and chronicity of condition    Goals:   Short Term Goals:  6 weeks   1. Pain: Pt will demonstrate improved pain by reports of less than or equal to 4/10 worst pain on the verbal rating scale in order to progress toward maximal functional ability and improve QOL.   2. Function: Patient will demonstrate improved function as indicated by a score of less than or equal to 42% on the OPTIMAL.     3. Mobility: Patient will improve AROM to 50% of stated goals, listed in objective measures above, in order to progress towards independence with functional activities.    4. Strength: Patient will improve strength to 50% of stated goals, listed in objective measures above, in order to progress towards independence with functional activities.    5. Gait: Patient will demonstrate improved gait mechanics including increased quinn with increased knee flexion in order to improve functional mobility, improve quality of life, and decrease risk of further injury or  fall.    6. HEP: Patient will demonstrate independence with HEP in order to progress toward functional independence.      Long Term Goals:  12 weeks   1. Pain: Pt will demonstrate improved pain by reports of less than or equal to 2/10 worst pain on the verbal rating scale in order to progress toward maximal functional ability and improve QOL.     2. Function: Patient will demonstrate improved function as indicated by a score of less than or equal to 36% on the OPTIMAL.     3. Mobility: Patient will improve AROM to stated goals, listed in objective measures above, in order to return to maximal functional potential and improve quality of life.   4. Strength: Patient will improve strength to stated goals, listed in objective measures above, in order to improve functional independence and quality of life.   5. Gait: Patient will demonstrate normalized gait mechanics with minimal compensation in order to return to PLOF.   6. Patient will return to normal ADL's, IADL's, community involvement, recreational activities, and work-related activities with less than or equal to 2/10 pain and maximal function.            Plan     Continue to progress aquatic therapy tolerance as well as land based HEP as patient tolerated progressions.      Rebecca Downing, PT

## 2019-08-12 ENCOUNTER — CLINICAL SUPPORT (OUTPATIENT)
Dept: REHABILITATION | Facility: HOSPITAL | Age: 64
End: 2019-08-12
Attending: INTERNAL MEDICINE
Payer: COMMERCIAL

## 2019-08-12 DIAGNOSIS — M62.81 MUSCLE WEAKNESS (GENERALIZED): ICD-10-CM

## 2019-08-12 DIAGNOSIS — R26.89 IMPAIRED GAIT AND MOBILITY: ICD-10-CM

## 2019-08-12 DIAGNOSIS — R26.89 POOR BALANCE: ICD-10-CM

## 2019-08-12 PROCEDURE — 97113 AQUATIC THERAPY/EXERCISES: CPT | Performed by: PHYSICAL THERAPIST

## 2019-08-12 NOTE — PROGRESS NOTES
Physical Therapy Daily Treatment Note     Name: Kellie Beckett  Clinic Number: [id number]    Therapy Diagnosis:   Encounter Diagnoses   Name Primary?    Muscle weakness (generalized)     Impaired gait and mobility     Poor balance      Physician: Vikki Hamlin MD    Visit Date: 8/12/2019    Physician Orders: PT Eval and Treat; Schedule for aquatic therapy and also progress and educate on land based HEP. Needs general strengthening and lower quarter stability.  Medical Diagnosis from Referral:   M54.41,G89.29 (ICD-10-CM) - Chronic right-sided low back pain with right-sided sciatica   M17.0 (ICD-10-CM) - Primary osteoarthritis of both knees   M25.551 (ICD-10-CM) - Right hip pain      Evaluation Date: 8/5/2019  Authorization Period Expiration: 7/15/2020  Plan of Care Expiration: 10/28/19  Visit # / Visits authorized: 3 / 36    Time In: 12:18 pm   Time Out: 1:06 pm   Total Billable Time: 45 minutes    Precautions: Standard    Subjective     Pt reports: she is feeling good today and states she has been since last session.  She states she went the whole day after the pool without pain and she was able to walk around and do more.     She was compliant with home exercise program.  Response to previous treatment: good   Functional change: She states she able to increase her steps by 1,000 daily since her first aquatic session.      Pain: 0/10  Location: right knee and R hip       Objective     Juventino received aquatic therapy with the use of water to decrease the pull of gravity and weightbearing forces on the body to increase tolerance to resisted therex to develop strength, endurance, ROM, flexibility, posture and core stabilization for 45 minutes including  Treadmill ambulation: Forward, 0.7 mph, 4 minutes  Treadmill ambulation: Sidestepping, 0.4 mph, 3 minutes each   Treadmill ambulation: Backward, 0.3 mph, 3 minutes  Standing Hamstring Curls, alternating B LE, 3 minutes  Standing alternating Marching, B LE 2  "minutes; with UE support   Standing hip flex/ext/abd, B LE, 2 x 10   Alternating heel/toe raises, 2 minutes; with UE support   Push/pull with kickboard, 2:00 on TM level with slow pace to maintain balance and form   Single leg bicycle, alternating with patient resting on side of the steps, 3:00   B HSS on step, 2  X 30"        Home Exercises Provided and Patient Education Provided     Education provided:       Written Home Exercises Provided: no.  Exercises were reviewed and Juventino was able to demonstrate them prior to the end of the session.  Juventino demonstrated NA understanding of the education provided.     See EMR under NA for exercises provided NA.    Assessment     Juventino tolerated session well with added speed on the treadmill and stretching today.    Juventino is progressing well towards her goals.   Pt prognosis is Good.     Pt will continue to benefit from skilled outpatient physical therapy to address the deficits listed in the problem list box on initial evaluation, provide pt/family education and to maximize pt's level of independence in the home and community environment.     Pt's spiritual, cultural and educational needs considered and pt agreeable to plan of care and goals.     Anticipated barriers to physical therapy: co-morbidities and chronicity of condition    Goals:   Short Term Goals:  6 weeks   1. Pain: Pt will demonstrate improved pain by reports of less than or equal to 4/10 worst pain on the verbal rating scale in order to progress toward maximal functional ability and improve QOL.   2. Function: Patient will demonstrate improved function as indicated by a score of less than or equal to 42% on the OPTIMAL.     3. Mobility: Patient will improve AROM to 50% of stated goals, listed in objective measures above, in order to progress towards independence with functional activities.    4. Strength: Patient will improve strength to 50% of stated goals, listed in objective measures above, in order to " progress towards independence with functional activities.    5. Gait: Patient will demonstrate improved gait mechanics including increased quinn with increased knee flexion in order to improve functional mobility, improve quality of life, and decrease risk of further injury or fall.    6. HEP: Patient will demonstrate independence with HEP in order to progress toward functional independence.      Long Term Goals:  12 weeks   1. Pain: Pt will demonstrate improved pain by reports of less than or equal to 2/10 worst pain on the verbal rating scale in order to progress toward maximal functional ability and improve QOL.     2. Function: Patient will demonstrate improved function as indicated by a score of less than or equal to 36% on the OPTIMAL.     3. Mobility: Patient will improve AROM to stated goals, listed in objective measures above, in order to return to maximal functional potential and improve quality of life.   4. Strength: Patient will improve strength to stated goals, listed in objective measures above, in order to improve functional independence and quality of life.   5. Gait: Patient will demonstrate normalized gait mechanics with minimal compensation in order to return to PLOF.   6. Patient will return to normal ADL's, IADL's, community involvement, recreational activities, and work-related activities with less than or equal to 2/10 pain and maximal function.            Plan     Continue to progress aquatic therapy tolerance as well as land based HEP as patient tolerated progressions.      Rebecca Downing, PT

## 2019-08-15 ENCOUNTER — CLINICAL SUPPORT (OUTPATIENT)
Dept: REHABILITATION | Facility: HOSPITAL | Age: 64
End: 2019-08-15
Payer: COMMERCIAL

## 2019-08-15 DIAGNOSIS — M62.81 MUSCLE WEAKNESS (GENERALIZED): ICD-10-CM

## 2019-08-15 DIAGNOSIS — R26.89 POOR BALANCE: ICD-10-CM

## 2019-08-15 DIAGNOSIS — R26.89 IMPAIRED GAIT AND MOBILITY: ICD-10-CM

## 2019-08-15 PROCEDURE — 97113 AQUATIC THERAPY/EXERCISES: CPT | Performed by: PHYSICAL THERAPIST

## 2019-08-15 NOTE — PROGRESS NOTES
Physical Therapy Daily Treatment Note     Name: Kellie Beckett  Clinic Number: [id number]    Therapy Diagnosis:   Encounter Diagnoses   Name Primary?    Muscle weakness (generalized)     Impaired gait and mobility     Poor balance      Physician: Vikki Hamlin MD    Visit Date: 8/15/2019    Physician Orders: PT Eval and Treat; Schedule for aquatic therapy and also progress and educate on land based HEP. Needs general strengthening and lower quarter stability.  Medical Diagnosis from Referral:   M54.41,G89.29 (ICD-10-CM) - Chronic right-sided low back pain with right-sided sciatica   M17.0 (ICD-10-CM) - Primary osteoarthritis of both knees   M25.551 (ICD-10-CM) - Right hip pain      Evaluation Date: 8/5/2019  Authorization Period Expiration: 7/15/2020  Plan of Care Expiration: 10/28/19  Visit # / Visits authorized: 4 / 36    Time In: 2:55 pm   Time Out: 3:38 pm   Total Billable Time: 40 minutes    Precautions: Standard    Subjective     Pt reports: she had her injections in the knees Tuesday and she is doing well.    She was compliant with home exercise program.  Response to previous treatment: good   Functional change: She states she able to increase her steps by 1,000 daily since her first aquatic session.      Pain: 0/10  Location: right knee and R hip       Objective     Juventino received aquatic therapy with the use of water to decrease the pull of gravity and weightbearing forces on the body to increase tolerance to resisted therex to develop strength, endurance, ROM, flexibility, posture and core stabilization for 40 minutes including  Treadmill ambulation: Forward, 0.8 to 1.0 mph, 5 minutes  Treadmill ambulation: Sidestepping, 0.5 mph, 3 minutes each   Treadmill ambulation: Backward, 0.6 mph, 5 minutes  Standing Hamstring Curls, alternating B LE, 3 minutes  Standing alternating Marching, B LE 2 minutes; with UE support   Standing hip flex/ext/abd, B LE, 2 x 10   Alternating heel/toe raises, 2 minutes;  "with UE support   Push/pull with kickboard, 2:00 on TM level with slow pace to maintain balance and form   Bicycle, 3:00 with noodle for support  B HSS on step, 2  X 30"        Home Exercises Provided and Patient Education Provided     Education provided:       Written Home Exercises Provided: no.  Exercises were reviewed and Juventino was able to demonstrate them prior to the end of the session.  Juventino demonstrated NA understanding of the education provided.     See EMR under NA for exercises provided NA.    Assessment     Juventino tolerated session well with the addition of speed and time on the treadmill today.  She required less rest breaks and performed her hip exercises at a faster pace today as she is getting more comfortable with the water.    Juventino is progressing well towards her goals.   Pt prognosis is Good.     Pt will continue to benefit from skilled outpatient physical therapy to address the deficits listed in the problem list box on initial evaluation, provide pt/family education and to maximize pt's level of independence in the home and community environment.     Pt's spiritual, cultural and educational needs considered and pt agreeable to plan of care and goals.     Anticipated barriers to physical therapy: co-morbidities and chronicity of condition    Goals:   Short Term Goals:  6 weeks   1. Pain: Pt will demonstrate improved pain by reports of less than or equal to 4/10 worst pain on the verbal rating scale in order to progress toward maximal functional ability and improve QOL.   2. Function: Patient will demonstrate improved function as indicated by a score of less than or equal to 42% on the OPTIMAL.     3. Mobility: Patient will improve AROM to 50% of stated goals, listed in objective measures above, in order to progress towards independence with functional activities.    4. Strength: Patient will improve strength to 50% of stated goals, listed in objective measures above, in order to progress towards " independence with functional activities.    5. Gait: Patient will demonstrate improved gait mechanics including increased quinn with increased knee flexion in order to improve functional mobility, improve quality of life, and decrease risk of further injury or fall.    6. HEP: Patient will demonstrate independence with HEP in order to progress toward functional independence.      Long Term Goals:  12 weeks   1. Pain: Pt will demonstrate improved pain by reports of less than or equal to 2/10 worst pain on the verbal rating scale in order to progress toward maximal functional ability and improve QOL.     2. Function: Patient will demonstrate improved function as indicated by a score of less than or equal to 36% on the OPTIMAL.     3. Mobility: Patient will improve AROM to stated goals, listed in objective measures above, in order to return to maximal functional potential and improve quality of life.   4. Strength: Patient will improve strength to stated goals, listed in objective measures above, in order to improve functional independence and quality of life.   5. Gait: Patient will demonstrate normalized gait mechanics with minimal compensation in order to return to PLOF.   6. Patient will return to normal ADL's, IADL's, community involvement, recreational activities, and work-related activities with less than or equal to 2/10 pain and maximal function.            Plan     Continue to progress aquatic therapy tolerance as well as land based HEP as patient tolerated progressions.      Rebecca Downing, PT

## 2019-08-19 ENCOUNTER — CLINICAL SUPPORT (OUTPATIENT)
Dept: REHABILITATION | Facility: HOSPITAL | Age: 64
End: 2019-08-19
Payer: COMMERCIAL

## 2019-08-19 DIAGNOSIS — R26.89 IMPAIRED GAIT AND MOBILITY: ICD-10-CM

## 2019-08-19 DIAGNOSIS — R26.89 POOR BALANCE: ICD-10-CM

## 2019-08-19 DIAGNOSIS — M62.81 MUSCLE WEAKNESS (GENERALIZED): ICD-10-CM

## 2019-08-19 PROCEDURE — 97110 THERAPEUTIC EXERCISES: CPT | Performed by: PHYSICAL THERAPIST

## 2019-08-19 NOTE — PROGRESS NOTES
Physical Therapy Daily Treatment Note     Name: Kellie Beckett  Clinic Number: [id number]    Therapy Diagnosis:   Encounter Diagnoses   Name Primary?    Muscle weakness (generalized)     Impaired gait and mobility     Poor balance      Physician: Vikki Hamlin MD    Visit Date: 8/19/2019    Physician Orders: PT Eval and Treat; Schedule for aquatic therapy and also progress and educate on land based HEP. Needs general strengthening and lower quarter stability.  Medical Diagnosis from Referral:   M54.41,G89.29 (ICD-10-CM) - Chronic right-sided low back pain with right-sided sciatica   M17.0 (ICD-10-CM) - Primary osteoarthritis of both knees   M25.551 (ICD-10-CM) - Right hip pain      Evaluation Date: 8/5/2019  Authorization Period Expiration: 7/15/2020  Plan of Care Expiration: 10/28/19  Visit # / Visits authorized: 5 / 36    Time In: 9:33 am   Time Out: 10:30 am   Total Billable Time: 45 minutes    Precautions: Standard    Subjective     Pt reports: she is not hurting in the hip unless she puts a lot of weight on her hip.    She was compliant with home exercise program.  Response to previous treatment: good   Functional change: She states she able to increase her steps by 1,000 daily since her first aquatic session.      Pain: 0/10  Location: right knee and R hip       Objective   Juventino received therapeutic exercises to develop strength, endurance, ROM, flexibility, posture and core stabilization for 35 minutes including:  Bike, 5:00, Level 1  Bridges, 2  X 10   Clamshells, 30x B LE   LTR, 20x B  Toe yoga, 2 x 10 B LE   Seated hip flex alt, 20x   Belt block, 20x each   Shin slides, 10x R LE     Juventino received cold pack for 10 minutes to R knee after session.              Juventino received aquatic therapy with the use of water to decrease the pull of gravity and weightbearing forces on the body to increase tolerance to resisted therex to develop strength, endurance, ROM, flexibility, posture and core stabilization  "for deferred today minutes including  Treadmill ambulation: Forward, 0.8 to 1.0 mph, 5 minutes  Treadmill ambulation: Sidestepping, 0.5 mph, 3 minutes each   Treadmill ambulation: Backward, 0.6 mph, 5 minutes  Standing Hamstring Curls, alternating B LE, 3 minutes  Standing alternating Marching, B LE 2 minutes; with UE support   Standing hip flex/ext/abd, B LE, 2 x 10   Alternating heel/toe raises, 2 minutes; with UE support   Push/pull with kickboard, 2:00 on TM level with slow pace to maintain balance and form   Bicycle, 3:00 with noodle for support  B HSS on step, 2  X 30"        Home Exercises Provided and Patient Education Provided     Education provided:       Written Home Exercises Provided: no.  Exercises were reviewed and Juventino was able to demonstrate them prior to the end of the session.  Juventino demonstrated NA understanding of the education provided.     See EMR under NA for exercises provided NA.    Assessment     Juventino was able to perform exercises in the gym today with slight increase in R knee/thigh pain.  She was educated on using ice again at home if needed.    Juventino is progressing well towards her goals.   Pt prognosis is Good.     Pt will continue to benefit from skilled outpatient physical therapy to address the deficits listed in the problem list box on initial evaluation, provide pt/family education and to maximize pt's level of independence in the home and community environment.     Pt's spiritual, cultural and educational needs considered and pt agreeable to plan of care and goals.     Anticipated barriers to physical therapy: co-morbidities and chronicity of condition    Goals:   Short Term Goals:  6 weeks   1. Pain: Pt will demonstrate improved pain by reports of less than or equal to 4/10 worst pain on the verbal rating scale in order to progress toward maximal functional ability and improve QOL.   2. Function: Patient will demonstrate improved function as indicated by a score of less than " or equal to 42% on the OPTIMAL.     3. Mobility: Patient will improve AROM to 50% of stated goals, listed in objective measures above, in order to progress towards independence with functional activities.    4. Strength: Patient will improve strength to 50% of stated goals, listed in objective measures above, in order to progress towards independence with functional activities.    5. Gait: Patient will demonstrate improved gait mechanics including increased quinn with increased knee flexion in order to improve functional mobility, improve quality of life, and decrease risk of further injury or fall.    6. HEP: Patient will demonstrate independence with HEP in order to progress toward functional independence.      Long Term Goals:  12 weeks   1. Pain: Pt will demonstrate improved pain by reports of less than or equal to 2/10 worst pain on the verbal rating scale in order to progress toward maximal functional ability and improve QOL.     2. Function: Patient will demonstrate improved function as indicated by a score of less than or equal to 36% on the OPTIMAL.     3. Mobility: Patient will improve AROM to stated goals, listed in objective measures above, in order to return to maximal functional potential and improve quality of life.   4. Strength: Patient will improve strength to stated goals, listed in objective measures above, in order to improve functional independence and quality of life.   5. Gait: Patient will demonstrate normalized gait mechanics with minimal compensation in order to return to PLOF.   6. Patient will return to normal ADL's, IADL's, community involvement, recreational activities, and work-related activities with less than or equal to 2/10 pain and maximal function.            Plan     Continue to progress aquatic therapy tolerance as well as land based HEP as patient tolerated progressions.      Rebecca Downing, PT

## 2019-08-22 ENCOUNTER — CLINICAL SUPPORT (OUTPATIENT)
Dept: REHABILITATION | Facility: HOSPITAL | Age: 64
End: 2019-08-22
Payer: COMMERCIAL

## 2019-08-22 DIAGNOSIS — M62.81 MUSCLE WEAKNESS (GENERALIZED): ICD-10-CM

## 2019-08-22 DIAGNOSIS — R26.89 IMPAIRED GAIT AND MOBILITY: ICD-10-CM

## 2019-08-22 DIAGNOSIS — R26.89 POOR BALANCE: ICD-10-CM

## 2019-08-22 PROCEDURE — 97113 AQUATIC THERAPY/EXERCISES: CPT | Performed by: PHYSICAL THERAPIST

## 2019-08-22 NOTE — PROGRESS NOTES
Physical Therapy Daily Treatment Note     Name: Kellie Beckett  Clinic Number: [id number]    Therapy Diagnosis:   Encounter Diagnoses   Name Primary?    Muscle weakness (generalized)     Impaired gait and mobility     Poor balance      Physician: Vikki Hamlin MD    Visit Date: 8/22/2019    Physician Orders: PT Eval and Treat; Schedule for aquatic therapy and also progress and educate on land based HEP. Needs general strengthening and lower quarter stability.  Medical Diagnosis from Referral:   M54.41,G89.29 (ICD-10-CM) - Chronic right-sided low back pain with right-sided sciatica   M17.0 (ICD-10-CM) - Primary osteoarthritis of both knees   M25.551 (ICD-10-CM) - Right hip pain      Evaluation Date: 8/5/2019  Authorization Period Expiration: 7/15/2020  Plan of Care Expiration: 10/28/19  Visit # / Visits authorized: 6 / 36    Time In: 9:04 am   Time Out: 9:55 am   Total Billable Time: 50 minutes    Precautions: Standard    Subjective     Pt reports: she was hurting a lot yesterday.  She did well after the session in the gym on Monday.  She states she was cleaning something yesterday and feels this may be the cause of her pain.  She rates her pain at 9/10 yesterday.    She was compliant with home exercise program.  Response to previous treatment: good   Functional change: She states she able to increase her steps by 1,000 daily since her first aquatic session.      Pain: 0/10  Location: right knee and R hip       Objective   Juventino received therapeutic exercises to develop strength, endurance, ROM, flexibility, posture and core stabilization for deferred today minutes including:  Bike, 5:00, Level 1  Bridges, 2  X 10   Clamshells, 30x B LE   LTR, 20x B  Toe yoga, 2 x 10 B LE   Seated hip flex alt, 20x   Belt block, 20x each   Shin slides, 10x R LE     Juventino received cold pack for 10 minutes to R knee after session.        Juventino received aquatic therapy with the use of water to decrease the pull of gravity and  "weightbearing forces on the body to increase tolerance to resisted therex to develop strength, endurance, ROM, flexibility, posture and core stabilization for 50 minutes including  Treadmill ambulation: Forward, 1.0 to 1.2 mph, 5 minutes  Treadmill ambulation: Sidestepping, 0.7 mph, 3 minutes each   Treadmill ambulation: Backward, 0.8 mph, 5 minutes  Standing Hamstring Curls, alternating B LE, 3 minutes  Standing alternating Marching, B LE 3 minutes; with UE support   Standing hip flex/ext/abd, B LE, 2 x 10   Alternating heel/toe raises, 2 minutes; with UE support   Push/pull with kickboard, 2:00 on TM level with slow pace to maintain balance and form   Flexion pull downs, 2 paddles, L3, 3:00   Bicycle, 3:00 with noodle for support  B HSS on step, 2  X 30"        Home Exercises Provided and Patient Education Provided     Education provided:       Written Home Exercises Provided: no.  Exercises were reviewed and Juventino was able to demonstrate them prior to the end of the session.  Juventino demonstrated NA understanding of the education provided.     See EMR under NA for exercises provided NA.    Assessment     Juventino was able to increase her speed on the treadmill today as well as her exercises in the water with no increased pain.    Juventino is progressing well towards her goals.   Pt prognosis is Good.     Pt will continue to benefit from skilled outpatient physical therapy to address the deficits listed in the problem list box on initial evaluation, provide pt/family education and to maximize pt's level of independence in the home and community environment.     Pt's spiritual, cultural and educational needs considered and pt agreeable to plan of care and goals.     Anticipated barriers to physical therapy: co-morbidities and chronicity of condition    Goals:   Short Term Goals:  6 weeks   1. Pain: Pt will demonstrate improved pain by reports of less than or equal to 4/10 worst pain on the verbal rating scale in order to " progress toward maximal functional ability and improve QOL.   2. Function: Patient will demonstrate improved function as indicated by a score of less than or equal to 42% on the OPTIMAL.     3. Mobility: Patient will improve AROM to 50% of stated goals, listed in objective measures above, in order to progress towards independence with functional activities.    4. Strength: Patient will improve strength to 50% of stated goals, listed in objective measures above, in order to progress towards independence with functional activities.    5. Gait: Patient will demonstrate improved gait mechanics including increased quinn with increased knee flexion in order to improve functional mobility, improve quality of life, and decrease risk of further injury or fall.    6. HEP: Patient will demonstrate independence with HEP in order to progress toward functional independence.      Long Term Goals:  12 weeks   1. Pain: Pt will demonstrate improved pain by reports of less than or equal to 2/10 worst pain on the verbal rating scale in order to progress toward maximal functional ability and improve QOL.     2. Function: Patient will demonstrate improved function as indicated by a score of less than or equal to 36% on the OPTIMAL.     3. Mobility: Patient will improve AROM to stated goals, listed in objective measures above, in order to return to maximal functional potential and improve quality of life.   4. Strength: Patient will improve strength to stated goals, listed in objective measures above, in order to improve functional independence and quality of life.   5. Gait: Patient will demonstrate normalized gait mechanics with minimal compensation in order to return to PLOF.   6. Patient will return to normal ADL's, IADL's, community involvement, recreational activities, and work-related activities with less than or equal to 2/10 pain and maximal function.            Plan     Continue to progress aquatic therapy tolerance as well as  land based HEP as patient tolerated progressions.      Rebecca Downing, PT

## 2019-08-29 ENCOUNTER — CLINICAL SUPPORT (OUTPATIENT)
Dept: REHABILITATION | Facility: HOSPITAL | Age: 64
End: 2019-08-29
Payer: COMMERCIAL

## 2019-08-29 DIAGNOSIS — M62.81 MUSCLE WEAKNESS (GENERALIZED): ICD-10-CM

## 2019-08-29 DIAGNOSIS — R26.89 IMPAIRED GAIT AND MOBILITY: ICD-10-CM

## 2019-08-29 DIAGNOSIS — R26.89 POOR BALANCE: ICD-10-CM

## 2019-08-29 PROCEDURE — 97113 AQUATIC THERAPY/EXERCISES: CPT | Performed by: PHYSICAL THERAPIST

## 2019-08-29 NOTE — PROGRESS NOTES
Physical Therapy Daily Treatment Note     Name: Kellie Beckett  Clinic Number: [id number]    Therapy Diagnosis:   Encounter Diagnoses   Name Primary?    Muscle weakness (generalized)     Impaired gait and mobility     Poor balance      Physician: Vikki Hamlin MD    Visit Date: 8/29/2019    Physician Orders: PT Eval and Treat; Schedule for aquatic therapy and also progress and educate on land based HEP. Needs general strengthening and lower quarter stability.  Medical Diagnosis from Referral:   M54.41,G89.29 (ICD-10-CM) - Chronic right-sided low back pain with right-sided sciatica   M17.0 (ICD-10-CM) - Primary osteoarthritis of both knees   M25.551 (ICD-10-CM) - Right hip pain      Evaluation Date: 8/5/2019  Authorization Period Expiration: 7/15/2020  Plan of Care Expiration: 10/28/19  Visit # / Visits authorized: 7 / 36    Time In: 9:04 am   Time Out: 9:54 am   Total Billable Time: 48 minutes    Precautions: Standard    Subjective     Pt reports: she had to miss Monday due to personal matters to take care of.  She states she is not hurting right now.    She was compliant with home exercise program.  Response to previous treatment: good   Functional change: She states she able to increase her steps by 1,000 daily since her first aquatic session.      Pain: 0/10  Location: right knee and R hip       Objective   Juventino received therapeutic exercises to develop strength, endurance, ROM, flexibility, posture and core stabilization for deferred today minutes including:  Bike, 5:00, Level 1  Bridges, 2  X 10   Clamshells, 30x B LE   LTR, 20x B  Toe yoga, 2 x 10 B LE   Seated hip flex alt, 20x   Belt block, 20x each   Shin slides, 10x R LE     Juventino received cold pack for 10 minutes to R knee after session.        Juventino received aquatic therapy with the use of water to decrease the pull of gravity and weightbearing forces on the body to increase tolerance to resisted therex to develop strength, endurance, ROM,  "flexibility, posture and core stabilization for 48 minutes including:  Use of 2.5# total weights, (1.25# on each LE)  Treadmill ambulation: Forward, 1.0 to 1.2 mph, 5 minutes  Treadmill ambulation: Sidestepping, 0.7 mph, 3 minutes each   Treadmill ambulation: Backward, 0.8 mph, 5 minutes  Standing Hamstring Curls, alternating B LE, 3 minutes  Standing alternating Marching, B LE 3 minutes; with UE support   Standing hip flex/ext/abd, B LE, 2 x 10   Alternating heel/toe raises, 2 minutes; with UE support   Push/pull with kickboard, 2:00 on TM level with slow pace to maintain balance and form   Flexion pull downs, 2 paddles, L3, 2:00   Trunk rotation, 2 paddles, L3, 2:00   Bicycle, 3:00 with noodle for support  B HSS on step, 2  X 30"        Home Exercises Provided and Patient Education Provided     Education provided:       Written Home Exercises Provided: no.  Exercises were reviewed and Juventino was able to demonstrate them prior to the end of the session.  Juventino demonstrated NA understanding of the education provided.     See EMR under NA for exercises provided NA.    Assessment     Juventino tolerated the ankle weights well again today.  Added core exercise today with good tolerance.    Juventino is progressing well towards her goals.  Pt prognosis is Good.     Pt will continue to benefit from skilled outpatient physical therapy to address the deficits listed in the problem list box on initial evaluation, provide pt/family education and to maximize pt's level of independence in the home and community environment.     Pt's spiritual, cultural and educational needs considered and pt agreeable to plan of care and goals.     Anticipated barriers to physical therapy: co-morbidities and chronicity of condition    Goals:   Short Term Goals:  6 weeks   1. Pain: Pt will demonstrate improved pain by reports of less than or equal to 4/10 worst pain on the verbal rating scale in order to progress toward maximal functional ability and " improve QOL.   2. Function: Patient will demonstrate improved function as indicated by a score of less than or equal to 42% on the OPTIMAL.     3. Mobility: Patient will improve AROM to 50% of stated goals, listed in objective measures above, in order to progress towards independence with functional activities.    4. Strength: Patient will improve strength to 50% of stated goals, listed in objective measures above, in order to progress towards independence with functional activities.    5. Gait: Patient will demonstrate improved gait mechanics including increased quinn with increased knee flexion in order to improve functional mobility, improve quality of life, and decrease risk of further injury or fall.    6. HEP: Patient will demonstrate independence with HEP in order to progress toward functional independence.      Long Term Goals:  12 weeks   1. Pain: Pt will demonstrate improved pain by reports of less than or equal to 2/10 worst pain on the verbal rating scale in order to progress toward maximal functional ability and improve QOL.     2. Function: Patient will demonstrate improved function as indicated by a score of less than or equal to 36% on the OPTIMAL.     3. Mobility: Patient will improve AROM to stated goals, listed in objective measures above, in order to return to maximal functional potential and improve quality of life.   4. Strength: Patient will improve strength to stated goals, listed in objective measures above, in order to improve functional independence and quality of life.   5. Gait: Patient will demonstrate normalized gait mechanics with minimal compensation in order to return to PLOF.   6. Patient will return to normal ADL's, IADL's, community involvement, recreational activities, and work-related activities with less than or equal to 2/10 pain and maximal function.            Plan     Continue to progress aquatic therapy tolerance as well as land based HEP as patient tolerated  progressions.      Rebecca Downing, PT

## 2019-09-03 ENCOUNTER — CLINICAL SUPPORT (OUTPATIENT)
Dept: REHABILITATION | Facility: HOSPITAL | Age: 64
End: 2019-09-03
Payer: COMMERCIAL

## 2019-09-03 DIAGNOSIS — R26.89 IMPAIRED GAIT AND MOBILITY: ICD-10-CM

## 2019-09-03 DIAGNOSIS — R26.89 POOR BALANCE: ICD-10-CM

## 2019-09-03 DIAGNOSIS — M62.81 MUSCLE WEAKNESS (GENERALIZED): ICD-10-CM

## 2019-09-03 PROCEDURE — 97110 THERAPEUTIC EXERCISES: CPT | Performed by: PHYSICAL THERAPIST

## 2019-09-03 NOTE — PROGRESS NOTES
Physical Therapy Daily Treatment Note     Name: Kellie Beckett  Clinic Number: [id number]    Therapy Diagnosis:   Encounter Diagnoses   Name Primary?    Muscle weakness (generalized)     Impaired gait and mobility     Poor balance      Physician: Vikki Hamlin MD    Visit Date: 9/3/2019    Physician Orders: PT Eval and Treat; Schedule for aquatic therapy and also progress and educate on land based HEP. Needs general strengthening and lower quarter stability.  Medical Diagnosis from Referral:   M54.41,G89.29 (ICD-10-CM) - Chronic right-sided low back pain with right-sided sciatica   M17.0 (ICD-10-CM) - Primary osteoarthritis of both knees   M25.551 (ICD-10-CM) - Right hip pain      Evaluation Date: 8/5/2019  Authorization Period Expiration: 7/15/2020  Plan of Care Expiration: 10/28/19  Visit # / Visits authorized: 8 / 36    Time In: 8:59 am   Time Out: 10:00 am   Total Billable Time: 45 minutes    Precautions: Standard    Subjective     Pt reports: she is feeling just a little pain in the R hip today.  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    She was compliant with home exercise program.  Response to previous treatment: good   Functional change: She states she is still increasing her steps and on Sunday had 8,800 which is about 2,000 more than the day prior.       Pain: 1/10  Location: R hip       Objective     RANGE OF MOTION:     Lumbar ROM Right  (spine)  8/5/2019 Left     8/5/2019 Pain/Dysfunction with Movement Goal Right 9/3/19 Left 9/3/19   Lumbar Flexion (60) 75% ---   90% 90% ---   Lumbar Extension (30) 50% ---   75% 60% ---   Lumbar Side Bending (25) 25% 50% Pain to the R 75% 50% 50%   Lumbar Rotation 25% 25%   75% 50% 50%      Hip ROM Right  8/5/2019 Left  8/5/2019 Pain/Dysfunction with Movement Goal Right  9/3/19 Left 9/3/19   Hip Flexion (120) WFL WFL     --- ---   Hip Extension (30) WFL WFL     --- ---   Hip Abduction  (45) WFL WFL     --- ---   Hip IR (45) 20 30  35 B  20 30   Hip ER (45) 30 35  35 B  35 40      Knee ROM Right  8/5/2019 Left  8/5/2019 Pain/Dysfunction with Movement Goal Right 9/3/19 Left 9/3/19   Knee Flexion (135) 115 125   125 B  120 125   Knee Extension (0) 0 0     --- ---         STRENGTH:     L/E MMT Right  8/5/2019 Left  8/5/2019 Pain/Dysfunction with Movement Goal Right 9/3/19 Left   9/3/19   Hip Flexion  3/5 4/5  4+/5 B  3/5 4/5   Hip Extension  3/5 3/5  4+/5 B 3+/5 3+/5   Hip Abduction  4-/5 4/5  5/5 B 4/5 4/5   Knee Extension 4-/5 4/5   5/5 B 4/5 4/5   Knee Flexion 4/5 4/5  5/5 B 4/5 4/5   Hip IR 3/5 3/5  4/5 B 3+/5 4/5   Hip ER 3+/5 3+/5  4/5 B 3+/5 4/5   Ankle DF 4/5 4+/5   5/5 B --- ---   Ankle PF 4+/5   Seated 4+/5  Seated   5/5  Seated  --- ---   Ankle Inversion 4/5 4/5   5/5 B --- ---   Ankle Eversion 4/5 4/5   5/5 B --- ---      MUSCLE LENGTH:      Muscle Tested  Right  8/5/2019 Left   8/5/2019 Goal Right  9/3/19 Left   9/3/19   Hamstring  decreased decreased Normal B  normal normal   Hip flexor  decreased decreased Normal B decreased normal        Sensation:  Sensation is intact to light touch     Palpation: Increased tone and tenderness noted with palpation of right gluteus abel, gluteus medius, piriformis and TFL. Increased tenderness noted with palpation of right PSIS at SIJ.      Posture:  Pt presents with postural abnormalities which include: trunk flexed posturing - improving     Gait Analysis: The patient ambulated with the following assistive device: none; the pt presents with the following gait abnormalities: decreased R knee flexion, decreased quinn and increased CONSUELO     Balance: SLS: L 3 seconds, R 4 seconds     Other: pitting edema B LE     FUNCTION:      OPTIMAL INSTRUMENT  The following scores are patient-reported values, with 1 representing the ability to do the activity without any difficulty, 2 representing the ability to do the activity with little difficulty, 3  "representing the ability to do with moderate difficulty, 4 representing the ability to do the activity with much difficulty, 5 representing the inability to do do the activity, and 9 representing that the activity is not applicable.                                          8/5/2019 9/3/19   1.  Lying flat 2 1   2.  Rolling over 3 2   3.  Moving - lying to sitting 2 1   4.  Sitting             1 1   5.  Squatting 4 3   6.  Bending/stooping 3 3   7.  Balancing             3 2   8.  Kneeling             5 3   9.  Standing 1 1   10.  Walking - short distance 2 1   11.  Walking - long distance 4 3   12.  Walking - outdoors 3 2   13.  Climbing stairs 4 NA   14.  Hopping             5 NA   15.  Jumping             5 NA   16.  Running 5 NA   17.  Pushing             2 1   18.  Pulling             3 1   19.  Reaching             1 1   20.  Grasping             2 1   21.  Lifting 3 1   22.  Carrying             1 1      Patient reports 15.28% impairment on the Optimal Instrument on 9/3/2019. (47.73% disability on 8/5/19)          Juventino received therapeutic exercises to develop strength, endurance, ROM, flexibility, posture and core stabilization for 45 minutes including:  Bike, 8:00, Level 1  Bridges, 3  X 10   Clamshells, 30x B LE - deferred today  LTR, 20x B  Toe yoga, 2 x 10 B LE - deferred today  Seated hip flex alt, 20x   Belt block, 20x each   Lynn slides, 10x R LE - deferred today  R prone quad stretch, 3  X30"  R SKTC/piriformis stretch, 4 x 10" with strap  MMT and ROM re-assessed with PROM and R hip flexion MWM to increase ROM    Juventino received cold pack for 10 minutes to R knee after session.  - deferred today      Juventino received aquatic therapy with the use of water to decrease the pull of gravity and weightbearing forces on the body to increase tolerance to resisted therex to develop strength, endurance, ROM, flexibility, posture and core stabilization for 48 minutes including:  Use of 2.5# total weights, (1.25# " "on each LE)  Treadmill ambulation: Forward, 1.0 to 1.2 mph, 5 minutes  Treadmill ambulation: Sidestepping, 0.7 mph, 3 minutes each   Treadmill ambulation: Backward, 0.8 mph, 5 minutes  Standing Hamstring Curls, alternating B LE, 3 minutes  Standing alternating Marching, B LE 3 minutes; with UE support   Standing hip flex/ext/abd, B LE, 2 x 10   Alternating heel/toe raises, 2 minutes; with UE support   Push/pull with kickboard, 2:00 on TM level with slow pace to maintain balance and form   Flexion pull downs, 2 paddles, L3, 2:00   Trunk rotation, 2 paddles, L3, 2:00   Bicycle, 3:00 with noodle for support  B HSS on step, 2  X 30"        Home Exercises Provided and Patient Education Provided     Education provided:   continue with HEP    Written Home Exercises Provided: Patient instructed to cont prior HEP.  Exercises were reviewed and Juventino was able to demonstrate them prior to the end of the session.  Juventino demonstrated NA understanding of the education provided.     See EMR under NA for exercises provided NA.    Assessment     Juventino tolerated increased passive stretching to the R hip today.  She continues to have limited ROM in the R hip compared to the L as well as increased pain in the R hip.     Juventino is progressing well towards her goals.  Pt prognosis is Good.     Pt will continue to benefit from skilled outpatient physical therapy to address the deficits listed in the problem list box on initial evaluation, provide pt/family education and to maximize pt's level of independence in the home and community environment.     Pt's spiritual, cultural and educational needs considered and pt agreeable to plan of care and goals.     Anticipated barriers to physical therapy: co-morbidities and chronicity of condition    Goals:   Short Term Goals:  6 weeks 9/3/19   1. Pain: Pt will demonstrate improved pain by reports of less than or equal to 4/10 worst pain on the verbal rating scale in order to progress toward maximal " functional ability and improve QOL. Met.     2. Function: Patient will demonstrate improved function as indicated by a score of less than or equal to 42% on the OPTIMAL.   Met.     3. Mobility: Patient will improve AROM to 50% of stated goals, listed in objective measures above, in order to progress towards independence with functional activities.  Progressing.     4. Strength: Patient will improve strength to 50% of stated goals, listed in objective measures above, in order to progress towards independence with functional activities.  Progressing.     5. Gait: Patient will demonstrate improved gait mechanics including increased quinn with increased knee flexion in order to improve functional mobility, improve quality of life, and decrease risk of further injury or fall.  Progressing.     6. HEP: Patient will demonstrate independence with HEP in order to progress toward functional independence. Met.        Long Term Goals:  12 weeks 9/3/19   1. Pain: Pt will demonstrate improved pain by reports of less than or equal to 2/10 worst pain on the verbal rating scale in order to progress toward maximal functional ability and improve QOL.   Met.     2. Function: Patient will demonstrate improved function as indicated by a score of less than or equal to 36% on the OPTIMAL.   Met.     3. Mobility: Patient will improve AROM to stated goals, listed in objective measures above, in order to return to maximal functional potential and improve quality of life. Progressing.     4. Strength: Patient will improve strength to stated goals, listed in objective measures above, in order to improve functional independence and quality of life. Progressing.     5. Gait: Patient will demonstrate normalized gait mechanics with minimal compensation in order to return to PLOF. Progressing.     6. Patient will return to normal ADL's, IADL's, community involvement, recreational activities, and work-related activities with less than or equal to  2/10 pain and maximal function.  Progressing.             Plan     Continue to progress aquatic therapy tolerance as well as land based strengthening and hip ROM as patient tolerated progressions.      Rebecca Downing, PT

## 2019-09-05 ENCOUNTER — CLINICAL SUPPORT (OUTPATIENT)
Dept: REHABILITATION | Facility: HOSPITAL | Age: 64
End: 2019-09-05
Payer: COMMERCIAL

## 2019-09-05 DIAGNOSIS — R26.89 POOR BALANCE: ICD-10-CM

## 2019-09-05 DIAGNOSIS — M62.81 MUSCLE WEAKNESS (GENERALIZED): ICD-10-CM

## 2019-09-05 DIAGNOSIS — R26.89 IMPAIRED GAIT AND MOBILITY: ICD-10-CM

## 2019-09-05 PROCEDURE — 97113 AQUATIC THERAPY/EXERCISES: CPT | Performed by: PHYSICAL THERAPIST

## 2019-09-05 NOTE — PROGRESS NOTES
"  Physical Therapy Daily Treatment Note     Name: Kellie Beckett  Clinic Number: [id number]    Therapy Diagnosis:   Encounter Diagnoses   Name Primary?    Muscle weakness (generalized)     Impaired gait and mobility     Poor balance      Physician: Vikki Hamlin MD    Visit Date: 9/5/2019    Physician Orders: PT Eval and Treat; Schedule for aquatic therapy and also progress and educate on land based HEP. Needs general strengthening and lower quarter stability.  Medical Diagnosis from Referral:   M54.41,G89.29 (ICD-10-CM) - Chronic right-sided low back pain with right-sided sciatica   M17.0 (ICD-10-CM) - Primary osteoarthritis of both knees   M25.551 (ICD-10-CM) - Right hip pain      Evaluation Date: 8/5/2019  Authorization Period Expiration: 7/15/2020  Plan of Care Expiration: 10/28/19  Visit # / Visits authorized: 9 / 36    Time In: 9:03 am   Time Out: 9:59 am   Total Billable Time: 55 minutes    Precautions: Standard    Subjective     Pt reports: she had some soreness after last session and took a nap after the session.    She was compliant with home exercise program.  Response to previous treatment: good with decreased soreness the day after    Functional change: able to walk close to 6,000 steps yesterday    Pain: 0/10  Location: R hip       Objective      Juventino received therapeutic exercises to develop strength, endurance, ROM, flexibility, posture and core stabilization for deferred today minutes including:  Bike, 8:00, Level 1  Bridges, 3  X 10   Clamshells, 30x B LE - deferred today  LTR, 20x B  Toe yoga, 2 x 10 B LE - deferred today  Seated hip flex alt, 20x   Belt block, 20x each   Lynn slides, 10x R LE - deferred today  R prone quad stretch, 3  X30"  R SKTC/piriformis stretch, 4 x 10" with strap  MMT and ROM re-assessed with PROM and R hip flexion MWM to increase ROM    Juventino received cold pack for 10 minutes to R knee after session.  - deferred today      Juventino received aquatic therapy with the use " "of water to decrease the pull of gravity and weightbearing forces on the body to increase tolerance to resisted therex to develop strength, endurance, ROM, flexibility, posture and core stabilization for 55 minutes including:  Use of 2.5# total weights, (1.25# on each LE)  Treadmill ambulation: Forward, 1.2 to 1.4 mph, 5 minutes  Treadmill ambulation: Sidestepping, 0.8 mph, 3 minutes each   Treadmill ambulation: Backward, 1.0 mph, 5 minutes  Standing Hamstring Curls, alternating B LE, 3 minutes  Standing alternating Marching, B LE 3 minutes; with UE alternating  Standing hip flex/ext/abd, B LE, 2 x 10   Alternating heel/toe raises, 2 minutes; with UE support   Push/pull with kickboard, 2:00 on TM level with slow pace to maintain balance and form   Flexion pull downs, 2 paddles, L3, 2:00   Trunk rotation, 2 paddles, L3, 2:00   Bicycle, 3:00 with noodle for support  B HSS on step, 2  X 30"  Hip circles, 1:00 each, CW/CCW on B LE      Home Exercises Provided and Patient Education Provided     Education provided:   continue with HEP    Written Home Exercises Provided: Patient instructed to cont prior HEP.  Exercises were reviewed and Juventino was able to demonstrate them prior to the end of the session.  Juventino demonstrated NA understanding of the education provided.     See EMR under NA for exercises provided NA.    Assessment     Juventino tolerated increased speed on treadmill today as well as increased core/balance activity with advancing her marching exercise.       Juventino is progressing well towards her goals.  Pt prognosis is Good.     Pt will continue to benefit from skilled outpatient physical therapy to address the deficits listed in the problem list box on initial evaluation, provide pt/family education and to maximize pt's level of independence in the home and community environment.     Pt's spiritual, cultural and educational needs considered and pt agreeable to plan of care and goals.     Anticipated barriers to " physical therapy: co-morbidities and chronicity of condition    Goals:   Short Term Goals:  6 weeks 9/3/19   1. Pain: Pt will demonstrate improved pain by reports of less than or equal to 4/10 worst pain on the verbal rating scale in order to progress toward maximal functional ability and improve QOL. Met.     2. Function: Patient will demonstrate improved function as indicated by a score of less than or equal to 42% on the OPTIMAL.   Met.     3. Mobility: Patient will improve AROM to 50% of stated goals, listed in objective measures above, in order to progress towards independence with functional activities.  Progressing.     4. Strength: Patient will improve strength to 50% of stated goals, listed in objective measures above, in order to progress towards independence with functional activities.  Progressing.     5. Gait: Patient will demonstrate improved gait mechanics including increased quinn with increased knee flexion in order to improve functional mobility, improve quality of life, and decrease risk of further injury or fall.  Progressing.     6. HEP: Patient will demonstrate independence with HEP in order to progress toward functional independence. Met.        Long Term Goals:  12 weeks 9/3/19   1. Pain: Pt will demonstrate improved pain by reports of less than or equal to 2/10 worst pain on the verbal rating scale in order to progress toward maximal functional ability and improve QOL.   Met.     2. Function: Patient will demonstrate improved function as indicated by a score of less than or equal to 36% on the OPTIMAL.   Met.     3. Mobility: Patient will improve AROM to stated goals, listed in objective measures above, in order to return to maximal functional potential and improve quality of life. Progressing.     4. Strength: Patient will improve strength to stated goals, listed in objective measures above, in order to improve functional independence and quality of life. Progressing.     5. Gait: Patient  will demonstrate normalized gait mechanics with minimal compensation in order to return to PLOF. Progressing.     6. Patient will return to normal ADL's, IADL's, community involvement, recreational activities, and work-related activities with less than or equal to 2/10 pain and maximal function.  Progressing.             Plan     Continue to progress aquatic therapy tolerance as well as land based strengthening and hip ROM as patient tolerated progressions.      Rebecca Downing, PT

## 2019-09-05 NOTE — PLAN OF CARE
Physical Therapy Daily Treatment Note     Name: Kellie Beckett  Clinic Number: [id number]    Therapy Diagnosis:   Encounter Diagnoses   Name Primary?    Muscle weakness (generalized)     Impaired gait and mobility     Poor balance      Physician: Vikki Hamlin MD    Visit Date: 9/3/2019    Physician Orders: PT Eval and Treat; Schedule for aquatic therapy and also progress and educate on land based HEP. Needs general strengthening and lower quarter stability.  Medical Diagnosis from Referral:   M54.41,G89.29 (ICD-10-CM) - Chronic right-sided low back pain with right-sided sciatica   M17.0 (ICD-10-CM) - Primary osteoarthritis of both knees   M25.551 (ICD-10-CM) - Right hip pain      Evaluation Date: 8/5/2019  Authorization Period Expiration: 7/15/2020  Plan of Care Expiration: 10/28/19  Visit # / Visits authorized: 8 / 36    Time In: 8:59 am   Time Out: 10:00 am   Total Billable Time: 45 minutes    Precautions: Standard    Subjective     Pt reports: she is feeling just a little pain in the R hip today.  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    She was compliant with home exercise program.  Response to previous treatment: good   Functional change: She states she is still increasing her steps and on Sunday had 8,800 which is about 2,000 more than the day prior.       Pain: 1/10  Location: R hip       Objective     RANGE OF MOTION:     Lumbar ROM Right  (spine)  8/5/2019 Left     8/5/2019 Pain/Dysfunction with Movement Goal Right 9/3/19 Left 9/3/19   Lumbar Flexion (60) 75% ---   90% 90% ---   Lumbar Extension (30) 50% ---   75% 60% ---   Lumbar Side Bending (25) 25% 50% Pain to the R 75% 50% 50%   Lumbar Rotation 25% 25%   75% 50% 50%      Hip ROM Right  8/5/2019 Left  8/5/2019 Pain/Dysfunction with Movement Goal Right  9/3/19 Left 9/3/19   Hip Flexion (120) WFL WFL     --- ---   Hip Extension (30) WFL WFL     --- ---   Hip Abduction  (45) WFL WFL     --- ---   Hip IR (45) 20 30  35 B  20 30   Hip ER (45) 30 35  35 B  35 40      Knee ROM Right  8/5/2019 Left  8/5/2019 Pain/Dysfunction with Movement Goal Right 9/3/19 Left 9/3/19   Knee Flexion (135) 115 125   125 B  120 125   Knee Extension (0) 0 0     --- ---         STRENGTH:     L/E MMT Right  8/5/2019 Left  8/5/2019 Pain/Dysfunction with Movement Goal Right 9/3/19 Left   9/3/19   Hip Flexion  3/5 4/5  4+/5 B  3/5 4/5   Hip Extension  3/5 3/5  4+/5 B 3+/5 3+/5   Hip Abduction  4-/5 4/5  5/5 B 4/5 4/5   Knee Extension 4-/5 4/5   5/5 B 4/5 4/5   Knee Flexion 4/5 4/5  5/5 B 4/5 4/5   Hip IR 3/5 3/5  4/5 B 3+/5 4/5   Hip ER 3+/5 3+/5  4/5 B 3+/5 4/5   Ankle DF 4/5 4+/5   5/5 B --- ---   Ankle PF 4+/5   Seated 4+/5  Seated   5/5  Seated  --- ---   Ankle Inversion 4/5 4/5   5/5 B --- ---   Ankle Eversion 4/5 4/5   5/5 B --- ---      MUSCLE LENGTH:      Muscle Tested  Right  8/5/2019 Left   8/5/2019 Goal Right  9/3/19 Left   9/3/19   Hamstring  decreased decreased Normal B  normal normal   Hip flexor  decreased decreased Normal B decreased normal        Sensation:  Sensation is intact to light touch     Palpation: Increased tone and tenderness noted with palpation of right gluteus abel, gluteus medius, piriformis and TFL. Increased tenderness noted with palpation of right PSIS at SIJ.      Posture:  Pt presents with postural abnormalities which include: trunk flexed posturing - improving     Gait Analysis: The patient ambulated with the following assistive device: none; the pt presents with the following gait abnormalities: decreased R knee flexion, decreased quinn and increased CONSUELO     Balance: SLS: L 3 seconds, R 4 seconds     Other: pitting edema B LE     FUNCTION:      OPTIMAL INSTRUMENT  The following scores are patient-reported values, with 1 representing the ability to do the activity without any difficulty, 2 representing the ability to do the activity with little difficulty, 3  "representing the ability to do with moderate difficulty, 4 representing the ability to do the activity with much difficulty, 5 representing the inability to do do the activity, and 9 representing that the activity is not applicable.                                          8/5/2019 9/3/19   1.  Lying flat 2 1   2.  Rolling over 3 2   3.  Moving - lying to sitting 2 1   4.  Sitting             1 1   5.  Squatting 4 3   6.  Bending/stooping 3 3   7.  Balancing             3 2   8.  Kneeling             5 3   9.  Standing 1 1   10.  Walking - short distance 2 1   11.  Walking - long distance 4 3   12.  Walking - outdoors 3 2   13.  Climbing stairs 4 NA   14.  Hopping             5 NA   15.  Jumping             5 NA   16.  Running 5 NA   17.  Pushing             2 1   18.  Pulling             3 1   19.  Reaching             1 1   20.  Grasping             2 1   21.  Lifting 3 1   22.  Carrying             1 1      Patient reports 15.28% impairment on the Optimal Instrument on 9/3/2019. (47.73% disability on 8/5/19)          Juventino received therapeutic exercises to develop strength, endurance, ROM, flexibility, posture and core stabilization for 45 minutes including:  Bike, 8:00, Level 1  Bridges, 3  X 10   Clamshells, 30x B LE - deferred today  LTR, 20x B  Toe yoga, 2 x 10 B LE - deferred today  Seated hip flex alt, 20x   Belt block, 20x each   Lynn slides, 10x R LE - deferred today  R prone quad stretch, 3  X30"  R SKTC/piriformis stretch, 4 x 10" with strap  MMT and ROM re-assessed with PROM and R hip flexion MWM to increase ROM    Juventino received cold pack for 10 minutes to R knee after session.  - deferred today      Juventino received aquatic therapy with the use of water to decrease the pull of gravity and weightbearing forces on the body to increase tolerance to resisted therex to develop strength, endurance, ROM, flexibility, posture and core stabilization for 48 minutes including:  Use of 2.5# total weights, (1.25# " "on each LE)  Treadmill ambulation: Forward, 1.0 to 1.2 mph, 5 minutes  Treadmill ambulation: Sidestepping, 0.7 mph, 3 minutes each   Treadmill ambulation: Backward, 0.8 mph, 5 minutes  Standing Hamstring Curls, alternating B LE, 3 minutes  Standing alternating Marching, B LE 3 minutes; with UE support   Standing hip flex/ext/abd, B LE, 2 x 10   Alternating heel/toe raises, 2 minutes; with UE support   Push/pull with kickboard, 2:00 on TM level with slow pace to maintain balance and form   Flexion pull downs, 2 paddles, L3, 2:00   Trunk rotation, 2 paddles, L3, 2:00   Bicycle, 3:00 with noodle for support  B HSS on step, 2  X 30"        Home Exercises Provided and Patient Education Provided     Education provided:   continue with HEP    Written Home Exercises Provided: Patient instructed to cont prior HEP.  Exercises were reviewed and Juventino was able to demonstrate them prior to the end of the session.  Juventino demonstrated NA understanding of the education provided.     See EMR under NA for exercises provided NA.    Assessment     Juventino tolerated increased passive stretching to the R hip today.  She continues to have limited ROM in the R hip compared to the L as well as increased pain in the R hip.     Juventino is progressing well towards her goals.  Pt prognosis is Good.     Pt will continue to benefit from skilled outpatient physical therapy to address the deficits listed in the problem list box on initial evaluation, provide pt/family education and to maximize pt's level of independence in the home and community environment.     Pt's spiritual, cultural and educational needs considered and pt agreeable to plan of care and goals.     Anticipated barriers to physical therapy: co-morbidities and chronicity of condition    Goals:   Short Term Goals:  6 weeks 9/3/19   1. Pain: Pt will demonstrate improved pain by reports of less than or equal to 4/10 worst pain on the verbal rating scale in order to progress toward maximal " functional ability and improve QOL. Met.     2. Function: Patient will demonstrate improved function as indicated by a score of less than or equal to 42% on the OPTIMAL.   Met.     3. Mobility: Patient will improve AROM to 50% of stated goals, listed in objective measures above, in order to progress towards independence with functional activities.  Progressing.     4. Strength: Patient will improve strength to 50% of stated goals, listed in objective measures above, in order to progress towards independence with functional activities.  Progressing.     5. Gait: Patient will demonstrate improved gait mechanics including increased quinn with increased knee flexion in order to improve functional mobility, improve quality of life, and decrease risk of further injury or fall.  Progressing.     6. HEP: Patient will demonstrate independence with HEP in order to progress toward functional independence. Met.        Long Term Goals:  12 weeks 9/3/19   1. Pain: Pt will demonstrate improved pain by reports of less than or equal to 2/10 worst pain on the verbal rating scale in order to progress toward maximal functional ability and improve QOL.   Met.     2. Function: Patient will demonstrate improved function as indicated by a score of less than or equal to 36% on the OPTIMAL.   Met.     3. Mobility: Patient will improve AROM to stated goals, listed in objective measures above, in order to return to maximal functional potential and improve quality of life. Progressing.     4. Strength: Patient will improve strength to stated goals, listed in objective measures above, in order to improve functional independence and quality of life. Progressing.     5. Gait: Patient will demonstrate normalized gait mechanics with minimal compensation in order to return to PLOF. Progressing.     6. Patient will return to normal ADL's, IADL's, community involvement, recreational activities, and work-related activities with less than or equal to  2/10 pain and maximal function.  Progressing.             Plan     Continue to progress aquatic therapy tolerance as well as land based strengthening and hip ROM as patient tolerated progressions.      Rebecca Downing, PT

## 2019-09-09 ENCOUNTER — CLINICAL SUPPORT (OUTPATIENT)
Dept: REHABILITATION | Facility: HOSPITAL | Age: 64
End: 2019-09-09
Payer: COMMERCIAL

## 2019-09-09 DIAGNOSIS — M62.81 MUSCLE WEAKNESS (GENERALIZED): ICD-10-CM

## 2019-09-09 DIAGNOSIS — R26.89 IMPAIRED GAIT AND MOBILITY: ICD-10-CM

## 2019-09-09 DIAGNOSIS — R26.89 POOR BALANCE: ICD-10-CM

## 2019-09-09 PROCEDURE — 97110 THERAPEUTIC EXERCISES: CPT | Performed by: PHYSICAL THERAPIST

## 2019-09-09 NOTE — PROGRESS NOTES
"  Physical Therapy Daily Treatment Note     Name: Kellie Beckett  Clinic Number: [id number]    Therapy Diagnosis:   Encounter Diagnoses   Name Primary?    Muscle weakness (generalized)     Impaired gait and mobility     Poor balance      Physician: Vikki Hamlin MD    Visit Date: 9/9/2019    Physician Orders: PT Eval and Treat; Schedule for aquatic therapy and also progress and educate on land based HEP. Needs general strengthening and lower quarter stability.  Medical Diagnosis from Referral:   M54.41,G89.29 (ICD-10-CM) - Chronic right-sided low back pain with right-sided sciatica   M17.0 (ICD-10-CM) - Primary osteoarthritis of both knees   M25.551 (ICD-10-CM) - Right hip pain      Evaluation Date: 8/5/2019  Authorization Period Expiration: 7/15/2020  Plan of Care Expiration: 10/28/19  Visit # / Visits authorized: 10 / 36    Time In: 10:00 am   Time Out: 10:56 am   Total Billable Time: 50 minutes    Precautions: Standard    Subjective     Pt reports: she is hurting a little more today in the R hip.    She was compliant with home exercise program.  Response to previous treatment: good  Functional change: still able to do more walking.      Pain: 4/10  Location: R hip       Objective      Juventino received therapeutic exercises to develop strength, endurance, ROM, flexibility, posture and core stabilization for 50 minutes including:  Bike, 8:00, Level 1  Bridges, 3  X 10   Clamshells, 30x B LE  LTR, 20x B  Toe yoga, 2 x 10 B LE - deferred today  Seated hip flex alt, 20x  - deferred today  Hooklying hip flex alternating, 10x B LE   Belt block, 20x each   Lynn slides, 10x R LE - deferred today  R prone quad stretch, 3  X30"  R SKTC/piriformis stretch, 5 x 10" with strap  PROM R hip abduction/IR/ER in supine and prone IR PROM with contract/relax method to increase hip IR.        Juventino received cold pack for 10 minutes to R knee after session.  - deferred today      Juventino received aquatic therapy with the use of water " "to decrease the pull of gravity and weightbearing forces on the body to increase tolerance to resisted therex to develop strength, endurance, ROM, flexibility, posture and core stabilization for deferred today including:  Use of 2.5# total weights, (1.25# on each LE)  Treadmill ambulation: Forward, 1.2 to 1.4 mph, 5 minutes  Treadmill ambulation: Sidestepping, 0.8 mph, 3 minutes each   Treadmill ambulation: Backward, 1.0 mph, 5 minutes  Standing Hamstring Curls, alternating B LE, 3 minutes  Standing alternating Marching, B LE 3 minutes; with UE alternating  Standing hip flex/ext/abd, B LE, 2 x 10   Alternating heel/toe raises, 2 minutes; with UE support   Push/pull with kickboard, 2:00 on TM level with slow pace to maintain balance and form   Flexion pull downs, 2 paddles, L3, 2:00   Trunk rotation, 2 paddles, L3, 2:00   Bicycle, 3:00 with noodle for support  B HSS on step, 2  X 30"  Hip circles, 1:00 each, CW/CCW on B LE      Home Exercises Provided and Patient Education Provided     Education provided:   Patient educated to perform her exercises she was issued by Dr. Hamlin at home on non-therapy days.      Written Home Exercises Provided: Patient instructed to cont prior HEP.  Exercises were reviewed and Juventino was able to demonstrate them prior to the end of the session.  Juventino demonstrated NA understanding of the education provided.     See EMR under NA for exercises provided NA.    Assessment     Juventino tolerated session well with report of decreased pain after session.  She was educated to perform the therex that was issued to her at home as she had some confusion on when she should be performing.      Juventino is progressing well towards her goals.  Pt prognosis is Good.     Pt will continue to benefit from skilled outpatient physical therapy to address the deficits listed in the problem list box on initial evaluation, provide pt/family education and to maximize pt's level of independence in the home and community " environment.     Pt's spiritual, cultural and educational needs considered and pt agreeable to plan of care and goals.     Anticipated barriers to physical therapy: co-morbidities and chronicity of condition    Goals:   Short Term Goals:  6 weeks 9/3/19   1. Pain: Pt will demonstrate improved pain by reports of less than or equal to 4/10 worst pain on the verbal rating scale in order to progress toward maximal functional ability and improve QOL. Met.     2. Function: Patient will demonstrate improved function as indicated by a score of less than or equal to 42% on the OPTIMAL.   Met.     3. Mobility: Patient will improve AROM to 50% of stated goals, listed in objective measures above, in order to progress towards independence with functional activities.  Progressing.     4. Strength: Patient will improve strength to 50% of stated goals, listed in objective measures above, in order to progress towards independence with functional activities.  Progressing.     5. Gait: Patient will demonstrate improved gait mechanics including increased quinn with increased knee flexion in order to improve functional mobility, improve quality of life, and decrease risk of further injury or fall.  Progressing.     6. HEP: Patient will demonstrate independence with HEP in order to progress toward functional independence. Met.        Long Term Goals:  12 weeks 9/3/19   1. Pain: Pt will demonstrate improved pain by reports of less than or equal to 2/10 worst pain on the verbal rating scale in order to progress toward maximal functional ability and improve QOL.   Met.     2. Function: Patient will demonstrate improved function as indicated by a score of less than or equal to 36% on the OPTIMAL.   Met.     3. Mobility: Patient will improve AROM to stated goals, listed in objective measures above, in order to return to maximal functional potential and improve quality of life. Progressing.     4. Strength: Patient will improve strength to  stated goals, listed in objective measures above, in order to improve functional independence and quality of life. Progressing.     5. Gait: Patient will demonstrate normalized gait mechanics with minimal compensation in order to return to PLOF. Progressing.     6. Patient will return to normal ADL's, IADL's, community involvement, recreational activities, and work-related activities with less than or equal to 2/10 pain and maximal function.  Progressing.             Plan     Continue to progress aquatic therapy tolerance as well as land based strengthening and hip ROM as patient tolerated progressions.      Rebecca Downing, PT

## 2019-09-16 ENCOUNTER — CLINICAL SUPPORT (OUTPATIENT)
Dept: REHABILITATION | Facility: HOSPITAL | Age: 64
End: 2019-09-16
Payer: COMMERCIAL

## 2019-09-16 DIAGNOSIS — R26.89 POOR BALANCE: ICD-10-CM

## 2019-09-16 DIAGNOSIS — R26.89 IMPAIRED GAIT AND MOBILITY: ICD-10-CM

## 2019-09-16 DIAGNOSIS — M62.81 MUSCLE WEAKNESS (GENERALIZED): ICD-10-CM

## 2019-09-16 PROCEDURE — 97110 THERAPEUTIC EXERCISES: CPT | Performed by: PHYSICAL THERAPIST

## 2019-09-16 PROCEDURE — 97140 MANUAL THERAPY 1/> REGIONS: CPT | Performed by: PHYSICAL THERAPIST

## 2019-09-16 NOTE — PROGRESS NOTES
"  Physical Therapy Daily Treatment Note     Name: Kellie Beckett  Clinic Number: [id number]    Therapy Diagnosis:   Encounter Diagnoses   Name Primary?    Muscle weakness (generalized)     Impaired gait and mobility     Poor balance      Physician: Vikki Hamlin MD    Visit Date: 9/16/2019    Physician Orders: PT Eval and Treat; Schedule for aquatic therapy and also progress and educate on land based HEP. Needs general strengthening and lower quarter stability.  Medical Diagnosis from Referral:   M54.41,G89.29 (ICD-10-CM) - Chronic right-sided low back pain with right-sided sciatica   M17.0 (ICD-10-CM) - Primary osteoarthritis of both knees   M25.551 (ICD-10-CM) - Right hip pain      Evaluation Date: 8/5/2019  Authorization Period Expiration: 7/15/2020  Plan of Care Expiration: 10/28/19  Visit # / Visits authorized: 11 / 36    Time In: 10:00 am   Time Out: 11:10 am   Total Billable Time: 46 minutes    Precautions: Standard    Subjective     Pt reports: she went to bed last night feeling ok but woke this morning with more pain in her R LB into the superior hip.      She was compliant with home exercise program.  Response to previous treatment: good  Functional change: still able to do more walking.      Pain: 7/10  Location: R hip/LB      Objective      Juventino received therapeutic exercises to develop strength, endurance, ROM, flexibility, posture and core stabilization for 36 minutes including:  Bike, 8:00, Level 1  Bridges, 3  X 10   Clamshells, 30x B LE  LTR, 20x B  Toe yoga, 2 x 10 B LE - deferred today  Seated hip flex alt, 20x  - deferred today  Hooklying hip flex alternating, 10x B LE   Belt block, 20x each   Lynn slides, 10x R LE - deferred today  B prone quad stretch, 3  X30"  R SKTC/piriformis stretch, 5 x 10" with strap  PROM R hip abduction/IR/ER in supine and prone IR PROM with contract/relax method to increase hip IR. - deferred today   Hooklying marching, 20x B VIDHI Jauregui received manual therapy " "for 10 minutes including STM and strumming to the R PSIS and into the R lumbar spine and the R superior gluts.      Juventino received cold pack for 10 minutes to R knee after session.  - deferred today    Juventino received moist heat for 15 minutes to the R LB - no charge.        Juventino received aquatic therapy with the use of water to decrease the pull of gravity and weightbearing forces on the body to increase tolerance to resisted therex to develop strength, endurance, ROM, flexibility, posture and core stabilization for deferred today including:  Use of 2.5# total weights, (1.25# on each LE)  Treadmill ambulation: Forward, 1.2 to 1.4 mph, 5 minutes  Treadmill ambulation: Sidestepping, 0.8 mph, 3 minutes each   Treadmill ambulation: Backward, 1.0 mph, 5 minutes  Standing Hamstring Curls, alternating B LE, 3 minutes  Standing alternating Marching, B LE 3 minutes; with UE alternating  Standing hip flex/ext/abd, B LE, 2 x 10   Alternating heel/toe raises, 2 minutes; with UE support   Push/pull with kickboard, 2:00 on TM level with slow pace to maintain balance and form   Flexion pull downs, 2 paddles, L3, 2:00   Trunk rotation, 2 paddles, L3, 2:00   Bicycle, 3:00 with noodle for support  B HSS on step, 2  X 30"  Hip circles, 1:00 each, CW/CCW on B LE      Home Exercises Provided and Patient Education Provided     Education provided:   Patient educated to perform her exercises she was issued by Dr. Hamlin at home on non-therapy days.      Written Home Exercises Provided: Patient instructed to cont prior HEP.  Exercises were reviewed and Juventino was able to demonstrate them prior to the end of the session.  Juventino demonstrated NA understanding of the education provided.     See EMR under NA for exercises provided NA.    Assessment     Juventino tolerated session well with decreased pain after session down to 2/10.  She had pelvic asymmetry which corrected with isometric exercise.    Juventino is progressing well towards her goals.  Pt " prognosis is Good.     Pt will continue to benefit from skilled outpatient physical therapy to address the deficits listed in the problem list box on initial evaluation, provide pt/family education and to maximize pt's level of independence in the home and community environment.     Pt's spiritual, cultural and educational needs considered and pt agreeable to plan of care and goals.     Anticipated barriers to physical therapy: co-morbidities and chronicity of condition    Goals:   Short Term Goals:  6 weeks 9/3/19   1. Pain: Pt will demonstrate improved pain by reports of less than or equal to 4/10 worst pain on the verbal rating scale in order to progress toward maximal functional ability and improve QOL. Met.     2. Function: Patient will demonstrate improved function as indicated by a score of less than or equal to 42% on the OPTIMAL.   Met.     3. Mobility: Patient will improve AROM to 50% of stated goals, listed in objective measures above, in order to progress towards independence with functional activities.  Progressing.     4. Strength: Patient will improve strength to 50% of stated goals, listed in objective measures above, in order to progress towards independence with functional activities.  Progressing.     5. Gait: Patient will demonstrate improved gait mechanics including increased quinn with increased knee flexion in order to improve functional mobility, improve quality of life, and decrease risk of further injury or fall.  Progressing.     6. HEP: Patient will demonstrate independence with HEP in order to progress toward functional independence. Met.        Long Term Goals:  12 weeks 9/3/19   1. Pain: Pt will demonstrate improved pain by reports of less than or equal to 2/10 worst pain on the verbal rating scale in order to progress toward maximal functional ability and improve QOL.   Met.     2. Function: Patient will demonstrate improved function as indicated by a score of less than or equal to  36% on the OPTIMAL.   Met.     3. Mobility: Patient will improve AROM to stated goals, listed in objective measures above, in order to return to maximal functional potential and improve quality of life. Progressing.     4. Strength: Patient will improve strength to stated goals, listed in objective measures above, in order to improve functional independence and quality of life. Progressing.     5. Gait: Patient will demonstrate normalized gait mechanics with minimal compensation in order to return to PLOF. Progressing.     6. Patient will return to normal ADL's, IADL's, community involvement, recreational activities, and work-related activities with less than or equal to 2/10 pain and maximal function.  Progressing.             Plan     Continue to progress aquatic therapy tolerance as well as land based strengthening and hip ROM as patient tolerated progressions.      Rebecca Downing, PT

## 2019-09-19 ENCOUNTER — CLINICAL SUPPORT (OUTPATIENT)
Dept: REHABILITATION | Facility: HOSPITAL | Age: 64
End: 2019-09-19
Payer: COMMERCIAL

## 2019-09-19 DIAGNOSIS — M62.81 MUSCLE WEAKNESS (GENERALIZED): ICD-10-CM

## 2019-09-19 DIAGNOSIS — R26.89 IMPAIRED GAIT AND MOBILITY: ICD-10-CM

## 2019-09-19 DIAGNOSIS — R26.89 POOR BALANCE: ICD-10-CM

## 2019-09-19 PROCEDURE — 97140 MANUAL THERAPY 1/> REGIONS: CPT | Performed by: PHYSICAL THERAPIST

## 2019-09-19 PROCEDURE — 97110 THERAPEUTIC EXERCISES: CPT | Performed by: PHYSICAL THERAPIST

## 2019-09-19 NOTE — PROGRESS NOTES
"  Physical Therapy Daily Treatment Note     Name: Kellie Beckett  Clinic Number: [id number]    Therapy Diagnosis:   Encounter Diagnoses   Name Primary?    Muscle weakness (generalized)     Impaired gait and mobility     Poor balance      Physician: Vikki Hamlin MD    Visit Date: 9/19/2019    Physician Orders: PT Eval and Treat; Schedule for aquatic therapy and also progress and educate on land based HEP. Needs general strengthening and lower quarter stability.  Medical Diagnosis from Referral:   M54.41,G89.29 (ICD-10-CM) - Chronic right-sided low back pain with right-sided sciatica   M17.0 (ICD-10-CM) - Primary osteoarthritis of both knees   M25.551 (ICD-10-CM) - Right hip pain      Evaluation Date: 8/5/2019  Authorization Period Expiration: 7/15/2020  Plan of Care Expiration: 10/28/19  Visit # / Visits authorized: 12 / 36    Time In: 10:00 am   Time Out: 11:05 am   Total Billable Time: 50 minutes    Precautions: Standard    Subjective     Pt reports: she states the pain in the R LB is not as bad as it was Monday but it is still painful.    She was compliant with home exercise program.  Response to previous treatment: good  Functional change: still able to do more walking.      Pain: 5/10  Location: R hip/LB      Objective      Juventino received therapeutic exercises to develop strength, endurance, ROM, flexibility, posture and core stabilization for 35 minutes including:  Bike, 8:00, Level 1  Bridges, 10x    Clamshells, 30x B LE- deferred today  LTR, 20x B  Toe yoga, 2 x 10 B LE - deferred today  Seated hip flex alt, 20x  - deferred today  Hooklying hip flex alternating, 10x B LE   Belt block, 20x each - deferred today  Lynn slides, 10x R LE - deferred today  B prone quad stretch, 3  X30"  R SKTC/piriformis stretch, 5 x 10" with strap- deferred today  PROM R hip abduction/IR/ER in supine and prone IR PROM with contract/relax method to increase hip IR. - deferred today   Hooklying marching, 20x B LE   Hip add " "with ball, 20x   DKTC with ball, 20x   B HSS with strap, 3  X 30"     Juventino received manual therapy for 15 minutes including STM and strumming to the R PSIS and into the R lumbar spine and the R superior gluts with MFR to the R lumbar spine.      Juventino received cold pack for 10 minutes to R knee after session.  - deferred today    Juventino received moist heat for 15 minutes to the R LB/hip in S/L - no charge.        Juventino received aquatic therapy with the use of water to decrease the pull of gravity and weightbearing forces on the body to increase tolerance to resisted therex to develop strength, endurance, ROM, flexibility, posture and core stabilization for deferred today including:  Use of 2.5# total weights, (1.25# on each LE)  Treadmill ambulation: Forward, 1.2 to 1.4 mph, 5 minutes  Treadmill ambulation: Sidestepping, 0.8 mph, 3 minutes each   Treadmill ambulation: Backward, 1.0 mph, 5 minutes  Standing Hamstring Curls, alternating B LE, 3 minutes  Standing alternating Marching, B LE 3 minutes; with UE alternating  Standing hip flex/ext/abd, B LE, 2 x 10   Alternating heel/toe raises, 2 minutes; with UE support   Push/pull with kickboard, 2:00 on TM level with slow pace to maintain balance and form   Flexion pull downs, 2 paddles, L3, 2:00   Trunk rotation, 2 paddles, L3, 2:00   Bicycle, 3:00 with noodle for support  B HSS on step, 2  X 30"  Hip circles, 1:00 each, CW/CCW on B LE      Home Exercises Provided and Patient Education Provided     Education provided:   Patient educated to perform her exercises she was issued by Dr. Hamlin at home on non-therapy days.      Written Home Exercises Provided: Patient instructed to cont prior HEP.  Exercises were reviewed and Juventino was able to demonstrate them prior to the end of the session.  Juventino demonstrated NA understanding of the education provided.     See EMR under NA for exercises provided NA.    Assessment     Juventino tolerated session well with decreased pain again " after session.    Juventino is progressing well towards her goals.  Pt prognosis is Good.     Pt will continue to benefit from skilled outpatient physical therapy to address the deficits listed in the problem list box on initial evaluation, provide pt/family education and to maximize pt's level of independence in the home and community environment.     Pt's spiritual, cultural and educational needs considered and pt agreeable to plan of care and goals.     Anticipated barriers to physical therapy: co-morbidities and chronicity of condition    Goals:   Short Term Goals:  6 weeks 9/3/19   1. Pain: Pt will demonstrate improved pain by reports of less than or equal to 4/10 worst pain on the verbal rating scale in order to progress toward maximal functional ability and improve QOL. Met.     2. Function: Patient will demonstrate improved function as indicated by a score of less than or equal to 42% on the OPTIMAL.   Met.     3. Mobility: Patient will improve AROM to 50% of stated goals, listed in objective measures above, in order to progress towards independence with functional activities.  Progressing.     4. Strength: Patient will improve strength to 50% of stated goals, listed in objective measures above, in order to progress towards independence with functional activities.  Progressing.     5. Gait: Patient will demonstrate improved gait mechanics including increased quinn with increased knee flexion in order to improve functional mobility, improve quality of life, and decrease risk of further injury or fall.  Progressing.     6. HEP: Patient will demonstrate independence with HEP in order to progress toward functional independence. Met.        Long Term Goals:  12 weeks 9/3/19   1. Pain: Pt will demonstrate improved pain by reports of less than or equal to 2/10 worst pain on the verbal rating scale in order to progress toward maximal functional ability and improve QOL.   Met.     2. Function: Patient will demonstrate  improved function as indicated by a score of less than or equal to 36% on the OPTIMAL.   Met.     3. Mobility: Patient will improve AROM to stated goals, listed in objective measures above, in order to return to maximal functional potential and improve quality of life. Progressing.     4. Strength: Patient will improve strength to stated goals, listed in objective measures above, in order to improve functional independence and quality of life. Progressing.     5. Gait: Patient will demonstrate normalized gait mechanics with minimal compensation in order to return to PLOF. Progressing.     6. Patient will return to normal ADL's, IADL's, community involvement, recreational activities, and work-related activities with less than or equal to 2/10 pain and maximal function.  Progressing.             Plan     Continue to progress aquatic therapy tolerance as well as land based strengthening and hip ROM as patient tolerated progressions.      Rebecca Downing, PT

## 2019-09-20 ENCOUNTER — OFFICE VISIT (OUTPATIENT)
Dept: PHYSICAL MEDICINE AND REHAB | Facility: CLINIC | Age: 64
End: 2019-09-20
Payer: COMMERCIAL

## 2019-09-20 VITALS
BODY MASS INDEX: 46.22 KG/M2 | HEIGHT: 64 IN | WEIGHT: 270.75 LBS | HEART RATE: 88 BPM | SYSTOLIC BLOOD PRESSURE: 155 MMHG | DIASTOLIC BLOOD PRESSURE: 89 MMHG | RESPIRATION RATE: 18 BRPM

## 2019-09-20 DIAGNOSIS — M17.0 PRIMARY OSTEOARTHRITIS OF BOTH KNEES: ICD-10-CM

## 2019-09-20 DIAGNOSIS — M25.551 RIGHT HIP PAIN: ICD-10-CM

## 2019-09-20 DIAGNOSIS — G89.29 CHRONIC RIGHT-SIDED LOW BACK PAIN WITH RIGHT-SIDED SCIATICA: Primary | ICD-10-CM

## 2019-09-20 DIAGNOSIS — M54.41 CHRONIC RIGHT-SIDED LOW BACK PAIN WITH RIGHT-SIDED SCIATICA: Primary | ICD-10-CM

## 2019-09-20 PROCEDURE — 3077F SYST BP >= 140 MM HG: CPT | Mod: CPTII,S$GLB,, | Performed by: PHYSICAL MEDICINE & REHABILITATION

## 2019-09-20 PROCEDURE — 99214 OFFICE O/P EST MOD 30 MIN: CPT | Mod: S$GLB,,, | Performed by: PHYSICAL MEDICINE & REHABILITATION

## 2019-09-20 PROCEDURE — 3079F DIAST BP 80-89 MM HG: CPT | Mod: CPTII,S$GLB,, | Performed by: PHYSICAL MEDICINE & REHABILITATION

## 2019-09-20 PROCEDURE — 3008F PR BODY MASS INDEX (BMI) DOCUMENTED: ICD-10-PCS | Mod: CPTII,S$GLB,, | Performed by: PHYSICAL MEDICINE & REHABILITATION

## 2019-09-20 PROCEDURE — 3008F BODY MASS INDEX DOCD: CPT | Mod: CPTII,S$GLB,, | Performed by: PHYSICAL MEDICINE & REHABILITATION

## 2019-09-20 PROCEDURE — 3077F PR MOST RECENT SYSTOLIC BLOOD PRESSURE >= 140 MM HG: ICD-10-PCS | Mod: CPTII,S$GLB,, | Performed by: PHYSICAL MEDICINE & REHABILITATION

## 2019-09-20 PROCEDURE — 99999 PR PBB SHADOW E&M-EST. PATIENT-LVL III: CPT | Mod: PBBFAC,,, | Performed by: PHYSICAL MEDICINE & REHABILITATION

## 2019-09-20 PROCEDURE — 99214 PR OFFICE/OUTPT VISIT, EST, LEVL IV, 30-39 MIN: ICD-10-PCS | Mod: S$GLB,,, | Performed by: PHYSICAL MEDICINE & REHABILITATION

## 2019-09-20 PROCEDURE — 99999 PR PBB SHADOW E&M-EST. PATIENT-LVL III: ICD-10-PCS | Mod: PBBFAC,,, | Performed by: PHYSICAL MEDICINE & REHABILITATION

## 2019-09-20 PROCEDURE — 3079F PR MOST RECENT DIASTOLIC BLOOD PRESSURE 80-89 MM HG: ICD-10-PCS | Mod: CPTII,S$GLB,, | Performed by: PHYSICAL MEDICINE & REHABILITATION

## 2019-09-20 NOTE — PROGRESS NOTES
PM&R Follow Up Visit :    Referring Physician: No ref. provider found    Chief Complaint   Patient presents with    Hip Pain     left    Knee Pain     bilateral       HPI: This is a 64 y.o.  female being seen in clinic today for evaluation of Hip Pain (left) and Knee Pain (bilateral)   Since last visit, the symptoms of the hips and knees are doing much better.  She has been to therapy with Sarina and has found it very beneficial. She's progressed from aquatic to land based therapy. She was feeling the best she had in quite some time, but over the last week has had a flare up of low back pain. It was pretty bad last weekend but has gradually improved through the week. She plans to continue PT until October.      History obtained from patient.    Past family, medical, social, surgical history, and vital signs reviewed in chart.    Review of Systems   Constitutional: Negative for chills, fever and weight loss.   HENT: Negative for hearing loss and sore throat.    Eyes: Negative for blurred vision, photophobia and pain.   Respiratory: Negative for shortness of breath.    Cardiovascular: Negative for chest pain.   Gastrointestinal: Negative for abdominal pain.   Genitourinary: Negative for dysuria.   Skin: Negative for rash.   Neurological: Negative for tingling and headaches.   Endo/Heme/Allergies: Does not bruise/bleed easily.   Psychiatric/Behavioral: Negative for depression.       Physical Exam   Constitutional: She is oriented to person, place, and time. She appears well-developed and well-nourished.   HENT:   Head: Normocephalic and atraumatic.   Eyes: Pupils are equal, round, and reactive to light. EOM are normal.   Neck: Normal range of motion. Neck supple.   Cardiovascular: Intact distal pulses.   Pulmonary/Chest: Effort normal.   Abdominal: She exhibits no distension.   Musculoskeletal:        Lumbar back: She exhibits decreased range of motion and tenderness. She exhibits no bony tenderness.   Neurological:  She is alert and oriented to person, place, and time. She has normal strength and normal reflexes. No sensory deficit.   Skin: Skin is warm and dry.   Psychiatric: She has a normal mood and affect.   Vitals reviewed.        IMPRESSION/PLAN: This is a 64 y.o.  female with:    Chronic right-sided low back pain with right-sided sciatica  -     Ambulatory Referral to Pain Clinic    Primary osteoarthritis of both knees  -     Ambulatory Referral to Pain Clinic    Right hip pain  -     Ambulatory Referral to Pain Clinic      The findings were discussed with Kellie in detail. I'm glad she's noted that much improvement. I don't want her to be upset by this low back flare up. We discussed that is typical with this type of pain. If she continues progressing with PT and her HEP, these flare ups should happen left often and be less severe. She'll continue a bit longer with therapy and progress to just an HEP. She was provided with this plan in writing. All of her questions were answered. She will follow up with me DARLENE.     Vikki Hamlin M.D.  Physical Medicine and Rehab

## 2019-09-23 ENCOUNTER — CLINICAL SUPPORT (OUTPATIENT)
Dept: REHABILITATION | Facility: HOSPITAL | Age: 64
End: 2019-09-23
Payer: COMMERCIAL

## 2019-09-23 DIAGNOSIS — M62.81 MUSCLE WEAKNESS (GENERALIZED): ICD-10-CM

## 2019-09-23 DIAGNOSIS — R26.89 POOR BALANCE: ICD-10-CM

## 2019-09-23 DIAGNOSIS — R26.89 IMPAIRED GAIT AND MOBILITY: ICD-10-CM

## 2019-09-23 PROCEDURE — 97110 THERAPEUTIC EXERCISES: CPT | Performed by: PHYSICAL THERAPIST

## 2019-09-23 PROCEDURE — 97140 MANUAL THERAPY 1/> REGIONS: CPT | Performed by: PHYSICAL THERAPIST

## 2019-09-24 NOTE — PROGRESS NOTES
"  Physical Therapy Daily Treatment Note     Name: Kellie Beckett  Clinic Number: [id number]    Therapy Diagnosis:   Encounter Diagnoses   Name Primary?    Muscle weakness (generalized)     Impaired gait and mobility     Poor balance      Physician: Vikki Hamlin MD    Visit Date: 9/23/2019    Physician Orders: PT Eval and Treat; Schedule for aquatic therapy and also progress and educate on land based HEP. Needs general strengthening and lower quarter stability.  Medical Diagnosis from Referral:   M54.41,G89.29 (ICD-10-CM) - Chronic right-sided low back pain with right-sided sciatica   M17.0 (ICD-10-CM) - Primary osteoarthritis of both knees   M25.551 (ICD-10-CM) - Right hip pain      Evaluation Date: 8/5/2019  Authorization Period Expiration: 7/15/2020  Plan of Care Expiration: 10/28/19  Visit # / Visits authorized: 13 / 36    Time In: 10:01 am   Time Out: 11:05 am   Total Billable Time: 50 minutes    Precautions: Standard    Subjective     Pt reports: she states the pain in the hip is much better today than it was last week.  She states she saw Dr. Hamlin last week and he agreed to her plan of continuing PT through the month of October to progress her HEP.    She was compliant with home exercise program.  Response to previous treatment: good  Functional change: still able to do more walking.      Pain: 5/10  Location: R hip/LB      Objective      Juventino received therapeutic exercises to develop strength, endurance, ROM, flexibility, posture and core stabilization for 36 minutes including:  Bike, 8:00, Level 1  Bridges, 2 x 10  Clamshells, 20x B LE  LTR, 20x B  Toe yoga, 2 x 10 B LE - deferred today  Seated hip flex alt, 20x  - deferred today  Hooklying hip flex alternating, 10x B LE - deferred today  Belt block, 20x each - deferred today  Lynn slides, 10x R LE - deferred today  B prone quad stretch, 3  X30"  R SKTC/piriformis stretch, 5 x 10" with strap  PROM R hip abduction/IR/ER in supine and prone IR PROM with " "contract/relax method to increase hip IR. - deferred today   Hooklying marching, 20x B LE - deferred today  Hip add with ball, 20x   DKTC with ball, 20x   B HSS with strap, 3  X 30"   Shuttle, L5, 3:00     Juventino received manual therapy for 14 minutes including STM and strumming to the R PSIS and into the R lumbar spine and the R superior gluts with MFR to the R lumbar spine.      Juventino received cold pack for 10 minutes to R knee after session.  - deferred today    Juventino received moist heat for 15 minutes to the R LB/hip in S/L - deferred today.          Juventino received aquatic therapy with the use of water to decrease the pull of gravity and weightbearing forces on the body to increase tolerance to resisted therex to develop strength, endurance, ROM, flexibility, posture and core stabilization for deferred today including:  Use of 2.5# total weights, (1.25# on each LE)  Treadmill ambulation: Forward, 1.2 to 1.4 mph, 5 minutes  Treadmill ambulation: Sidestepping, 0.8 mph, 3 minutes each   Treadmill ambulation: Backward, 1.0 mph, 5 minutes  Standing Hamstring Curls, alternating B LE, 3 minutes  Standing alternating Marching, B LE 3 minutes; with UE alternating  Standing hip flex/ext/abd, B LE, 2 x 10   Alternating heel/toe raises, 2 minutes; with UE support   Push/pull with kickboard, 2:00 on TM level with slow pace to maintain balance and form   Flexion pull downs, 2 paddles, L3, 2:00   Trunk rotation, 2 paddles, L3, 2:00   Bicycle, 3:00 with noodle for support  B HSS on step, 2  X 30"  Hip circles, 1:00 each, CW/CCW on B LE      Home Exercises Provided and Patient Education Provided     Education provided:   Patient educated to perform her exercises she was issued by Dr. Hamlin at home on non-therapy days.      Written Home Exercises Provided: Patient instructed to cont prior HEP.  Exercises were reviewed and Juventino was able to demonstrate them prior to the end of the session.  Juventino demonstrated NA understanding of the " education provided.     See EMR under NA for exercises provided NA.    Assessment     Juventino tolerated session well with decreased tension in the R hip.  She was able to progress her strengthening exercises today as well.    Juventino is progressing well towards her goals.  Pt prognosis is Good.     Pt will continue to benefit from skilled outpatient physical therapy to address the deficits listed in the problem list box on initial evaluation, provide pt/family education and to maximize pt's level of independence in the home and community environment.     Pt's spiritual, cultural and educational needs considered and pt agreeable to plan of care and goals.     Anticipated barriers to physical therapy: co-morbidities and chronicity of condition    Goals:   Short Term Goals:  6 weeks 9/3/19   1. Pain: Pt will demonstrate improved pain by reports of less than or equal to 4/10 worst pain on the verbal rating scale in order to progress toward maximal functional ability and improve QOL. Met.     2. Function: Patient will demonstrate improved function as indicated by a score of less than or equal to 42% on the OPTIMAL.   Met.     3. Mobility: Patient will improve AROM to 50% of stated goals, listed in objective measures above, in order to progress towards independence with functional activities.  Progressing.     4. Strength: Patient will improve strength to 50% of stated goals, listed in objective measures above, in order to progress towards independence with functional activities.  Progressing.     5. Gait: Patient will demonstrate improved gait mechanics including increased quinn with increased knee flexion in order to improve functional mobility, improve quality of life, and decrease risk of further injury or fall.  Progressing.     6. HEP: Patient will demonstrate independence with HEP in order to progress toward functional independence. Met.        Long Term Goals:  12 weeks 9/3/19   1. Pain: Pt will demonstrate improved  pain by reports of less than or equal to 2/10 worst pain on the verbal rating scale in order to progress toward maximal functional ability and improve QOL.   Met.     2. Function: Patient will demonstrate improved function as indicated by a score of less than or equal to 36% on the OPTIMAL.   Met.     3. Mobility: Patient will improve AROM to stated goals, listed in objective measures above, in order to return to maximal functional potential and improve quality of life. Progressing.     4. Strength: Patient will improve strength to stated goals, listed in objective measures above, in order to improve functional independence and quality of life. Progressing.     5. Gait: Patient will demonstrate normalized gait mechanics with minimal compensation in order to return to PLOF. Progressing.     6. Patient will return to normal ADL's, IADL's, community involvement, recreational activities, and work-related activities with less than or equal to 2/10 pain and maximal function.  Progressing.             Plan     Continue to progress aquatic therapy tolerance as well as land based strengthening and hip ROM as patient tolerated progressions.      Rebecca Downing, PT

## 2019-09-26 ENCOUNTER — CLINICAL SUPPORT (OUTPATIENT)
Dept: REHABILITATION | Facility: HOSPITAL | Age: 64
End: 2019-09-26
Payer: COMMERCIAL

## 2019-09-26 DIAGNOSIS — M62.81 MUSCLE WEAKNESS (GENERALIZED): ICD-10-CM

## 2019-09-26 DIAGNOSIS — R26.89 POOR BALANCE: ICD-10-CM

## 2019-09-26 DIAGNOSIS — R26.89 IMPAIRED GAIT AND MOBILITY: ICD-10-CM

## 2019-09-26 PROCEDURE — 97113 AQUATIC THERAPY/EXERCISES: CPT | Performed by: PHYSICAL THERAPIST

## 2019-09-26 NOTE — PROGRESS NOTES
"  Physical Therapy Daily Treatment Note     Name: Kellie Beckett  Clinic Number: [id number]    Therapy Diagnosis:   Encounter Diagnoses   Name Primary?    Muscle weakness (generalized)     Impaired gait and mobility     Poor balance      Physician: Vikki Hamlin MD    Visit Date: 9/26/2019    Physician Orders: PT Eval and Treat; Schedule for aquatic therapy and also progress and educate on land based HEP. Needs general strengthening and lower quarter stability.  Medical Diagnosis from Referral:   M54.41,G89.29 (ICD-10-CM) - Chronic right-sided low back pain with right-sided sciatica   M17.0 (ICD-10-CM) - Primary osteoarthritis of both knees   M25.551 (ICD-10-CM) - Right hip pain      Evaluation Date: 8/5/2019  Authorization Period Expiration: 7/15/2020  Plan of Care Expiration: 10/28/19  Visit # / Visits authorized: 14 / 36    Time In: 10:04 am   Time Out: 11:02 am   Total Billable Time: 55 minutes    Precautions: Standard    Subjective     Pt reports: she feels her pain in the LB is 100% better.  She states she just kept walking and is feeling a lot better.    She was compliant with home exercise program.  Response to previous treatment: good  Functional change: still able to do more walking.      Pain: 0/10  Location: R hip/LB      Objective      Juventino received therapeutic exercises to develop strength, endurance, ROM, flexibility, posture and core stabilization for deferred today minutes including:  Bike, 8:00, Level 1  Bridges, 2 x 10  Clamshells, 20x B LE  LTR, 20x B  Toe yoga, 2 x 10 B LE - deferred today  Seated hip flex alt, 20x  - deferred today  Hooklying hip flex alternating, 10x B LE - deferred today  Belt block, 20x each - deferred today  Lynn slides, 10x R LE - deferred today  B prone quad stretch, 3  X30"  R SKTC/piriformis stretch, 5 x 10" with strap  PROM R hip abduction/IR/ER in supine and prone IR PROM with contract/relax method to increase hip IR. - deferred today   Hooklying marching, 20x B " "LE - deferred today  Hip add with ball, 20x   DKTC with ball, 20x   B HSS with strap, 3  X 30"   Shuttle, L5, 3:00     Juventino received manual therapy for deferred today including STM and strumming to the R PSIS and into the R lumbar spine and the R superior gluts with MFR to the R lumbar spine.      Juventino received cold pack for 10 minutes to R knee after session.  - deferred today    Juventino received moist heat for 15 minutes to the R LB/hip in S/L - deferred today.          Juventino received aquatic therapy with the use of water to decrease the pull of gravity and weightbearing forces on the body to increase tolerance to resisted therex to develop strength, endurance, ROM, flexibility, posture and core stabilization for 55 including:  Use of 5# total weights, (2.5# on each LE)  Treadmill ambulation: Forward, 1.4 mph, 5 minutes  Treadmill ambulation: Sidestepping, 0.8 mph, 3 minutes each   Treadmill ambulation: Backward, 1.0 mph, 5 minutes  Standing Hamstring Curls, alternating B LE, 3 minutes  Standing alternating Marching, B LE 3 minutes; with UE alternating  Standing hip flex/ext/abd, B LE, 2 x 10   Alternating heel/toe raises, 2 minutes; with UE support   Push/pull with kickboard, 2:00 on TM level with slow pace to maintain balance and form   Flexion pull downs, 2 paddles, L3, 2:00   Trunk rotation, 2 paddles, L3, 2:00   Bicycle, 3:00 with noodle for support  B HSS on step, 1  X 30"  Hip circles, 1:00 each, CW/CCW on B LE  Scissors with use of noodle for support, horizontal and vertical, 2 minutes each       Home Exercises Provided and Patient Education Provided     Education provided:   Patient educated to perform her exercises she was issued by Dr. Hamlin at home on non-therapy days.      Written Home Exercises Provided: Patient instructed to cont prior HEP.  Exercises were reviewed and Juventino was able to demonstrate them prior to the end of the session.  Juventino demonstrated NA understanding of the education provided. "     See EMR under NA for exercises provided NA.    Assessment     Juventino tolerated session well with increased exercise for the core and hips.  She is ready to progress to land based PT only and will be issued a copy of the aquatic exercises in order to perform on her own at a pool if she has access.    Juventino is progressing well towards her goals.  Pt prognosis is Good.     Pt will continue to benefit from skilled outpatient physical therapy to address the deficits listed in the problem list box on initial evaluation, provide pt/family education and to maximize pt's level of independence in the home and community environment.     Pt's spiritual, cultural and educational needs considered and pt agreeable to plan of care and goals.     Anticipated barriers to physical therapy: co-morbidities and chronicity of condition    Goals:   Short Term Goals:  6 weeks 9/3/19   1. Pain: Pt will demonstrate improved pain by reports of less than or equal to 4/10 worst pain on the verbal rating scale in order to progress toward maximal functional ability and improve QOL. Met.     2. Function: Patient will demonstrate improved function as indicated by a score of less than or equal to 42% on the OPTIMAL.   Met.     3. Mobility: Patient will improve AROM to 50% of stated goals, listed in objective measures above, in order to progress towards independence with functional activities.  Progressing.     4. Strength: Patient will improve strength to 50% of stated goals, listed in objective measures above, in order to progress towards independence with functional activities.  Progressing.     5. Gait: Patient will demonstrate improved gait mechanics including increased quinn with increased knee flexion in order to improve functional mobility, improve quality of life, and decrease risk of further injury or fall.  Progressing.     6. HEP: Patient will demonstrate independence with HEP in order to progress toward functional independence. Met.         Long Term Goals:  12 weeks 9/3/19   1. Pain: Pt will demonstrate improved pain by reports of less than or equal to 2/10 worst pain on the verbal rating scale in order to progress toward maximal functional ability and improve QOL.   Met.     2. Function: Patient will demonstrate improved function as indicated by a score of less than or equal to 36% on the OPTIMAL.   Met.     3. Mobility: Patient will improve AROM to stated goals, listed in objective measures above, in order to return to maximal functional potential and improve quality of life. Progressing.     4. Strength: Patient will improve strength to stated goals, listed in objective measures above, in order to improve functional independence and quality of life. Progressing.     5. Gait: Patient will demonstrate normalized gait mechanics with minimal compensation in order to return to PLOF. Progressing.     6. Patient will return to normal ADL's, IADL's, community involvement, recreational activities, and work-related activities with less than or equal to 2/10 pain and maximal function.  Progressing.             Plan     Continue with POC and progress land based PT as the patient tolerates.        Rebecca Downing, PT

## 2019-10-02 ENCOUNTER — CLINICAL SUPPORT (OUTPATIENT)
Dept: REHABILITATION | Facility: HOSPITAL | Age: 64
End: 2019-10-02
Payer: COMMERCIAL

## 2019-10-02 DIAGNOSIS — R26.89 IMPAIRED GAIT AND MOBILITY: ICD-10-CM

## 2019-10-02 DIAGNOSIS — R26.89 POOR BALANCE: ICD-10-CM

## 2019-10-02 DIAGNOSIS — M62.81 MUSCLE WEAKNESS (GENERALIZED): ICD-10-CM

## 2019-10-02 PROCEDURE — 97140 MANUAL THERAPY 1/> REGIONS: CPT | Performed by: PHYSICAL THERAPIST

## 2019-10-02 PROCEDURE — 97110 THERAPEUTIC EXERCISES: CPT | Performed by: PHYSICAL THERAPIST

## 2019-10-02 NOTE — PROGRESS NOTES
"  Physical Therapy Daily Treatment Note     Name: Kellie Beckett  Clinic Number: [id number]    Therapy Diagnosis:   Encounter Diagnoses   Name Primary?    Muscle weakness (generalized)     Impaired gait and mobility     Poor balance      Physician: Vikki Hamlin MD    Visit Date: 10/2/2019    Physician Orders: PT Eval and Treat; Schedule for aquatic therapy and also progress and educate on land based HEP. Needs general strengthening and lower quarter stability.  Medical Diagnosis from Referral:   M54.41,G89.29 (ICD-10-CM) - Chronic right-sided low back pain with right-sided sciatica   M17.0 (ICD-10-CM) - Primary osteoarthritis of both knees   M25.551 (ICD-10-CM) - Right hip pain      Evaluation Date: 8/5/2019  Authorization Period Expiration: 7/15/2020  Plan of Care Expiration: 10/28/19  Visit # / Visits authorized: 15 / 36    Time In: 9:58 am   Time Out: 11:02 am   Total Billable Time: 55 minutes    Precautions: Standard    Subjective     Pt reports: her pain is off and on.  Today is a good day with minimal pain in the hip but she does feel stiffness in the R knee.    She was compliant with home exercise program.  Response to previous treatment: good  Functional change: still able to do more walking.      Pain: 0/10  Location: R hip/LB      Objective      Juventino received therapeutic exercises to develop strength, endurance, ROM, flexibility, posture and core stabilization for 45 minutes including:  Bike, 8:00, Level 1, Level 2 for 3 minutes  Bridges, 2 x 10  Clamshells, 20x B LE  LTR, 20x B  Toe yoga, 2 x 10 B LE - deferred today  Seated hip flex alt, 20x  - deferred today  Hooklying hip flex alternating, 10x B LE - deferred today  Belt block, 20x each   Lynn slides, 10x R LE - deferred today  B prone quad stretch, 3  X30"  B SKTC/piriformis stretch, 3 x 30" with strap  PROM R hip abduction/IR/ER in supine and prone IR PROM with contract/relax method to increase hip IR. - deferred today   Hooklying marching, " "20x B LE - deferred today  Hip add with ball, 20x - deferred today   DKTC with ball, 20x   B HSS with strap, 3  X 30"   Shuttle, L5, 3:00   Hip 3-way, 1 x 10   Glut sets, 20x     Juventino received manual therapy for 10 minutes including STM and strumming to the R knee at the medial joint line and surrounding musculature to decrease tissue tension and pain.      Juventino received cold pack for 10 minutes to R knee after session.  - deferred today    Juventino received moist heat for 15 minutes to the R LB/hip in S/L - deferred today.          Juventino received aquatic therapy with the use of water to decrease the pull of gravity and weightbearing forces on the body to increase tolerance to resisted therex to develop strength, endurance, ROM, flexibility, posture and core stabilization for deferred today including:  Use of 5# total weights, (2.5# on each LE)  Treadmill ambulation: Forward, 1.4 mph, 5 minutes  Treadmill ambulation: Sidestepping, 0.8 mph, 3 minutes each   Treadmill ambulation: Backward, 1.0 mph, 5 minutes  Standing Hamstring Curls, alternating B LE, 3 minutes  Standing alternating Marching, B LE 3 minutes; with UE alternating  Standing hip flex/ext/abd, B LE, 2 x 10   Alternating heel/toe raises, 2 minutes; with UE support   Push/pull with kickboard, 2:00 on TM level with slow pace to maintain balance and form   Flexion pull downs, 2 paddles, L3, 2:00   Trunk rotation, 2 paddles, L3, 2:00   Bicycle, 3:00 with noodle for support  B HSS on step, 1  X 30"  Hip circles, 1:00 each, CW/CCW on B LE  Scissors with use of noodle for support, horizontal and vertical, 2 minutes each       Home Exercises Provided and Patient Education Provided     Education provided:   Patient educated to perform her exercises she was issued by Dr. Hamlin at home on non-therapy days.      Written Home Exercises Provided: Patient instructed to cont prior HEP.  Exercises were reviewed and Juventino was able to demonstrate them prior to the end of the " session.  Juventino demonstrated NA understanding of the education provided.     See EMR under NA for exercises provided NA.    Assessment     Juventino tolerated session well with correction of SIJ symmetry with isometric exercise.  She was able to perform standing hip exercises today with only slight discomfort.    Juventino is progressing well towards her goals.  Pt prognosis is Good.     Pt will continue to benefit from skilled outpatient physical therapy to address the deficits listed in the problem list box on initial evaluation, provide pt/family education and to maximize pt's level of independence in the home and community environment.     Pt's spiritual, cultural and educational needs considered and pt agreeable to plan of care and goals.     Anticipated barriers to physical therapy: co-morbidities and chronicity of condition    Goals:   Short Term Goals:  6 weeks 9/3/19   1. Pain: Pt will demonstrate improved pain by reports of less than or equal to 4/10 worst pain on the verbal rating scale in order to progress toward maximal functional ability and improve QOL. Met.     2. Function: Patient will demonstrate improved function as indicated by a score of less than or equal to 42% on the OPTIMAL.   Met.     3. Mobility: Patient will improve AROM to 50% of stated goals, listed in objective measures above, in order to progress towards independence with functional activities.  Progressing.     4. Strength: Patient will improve strength to 50% of stated goals, listed in objective measures above, in order to progress towards independence with functional activities.  Progressing.     5. Gait: Patient will demonstrate improved gait mechanics including increased quinn with increased knee flexion in order to improve functional mobility, improve quality of life, and decrease risk of further injury or fall.  Progressing.     6. HEP: Patient will demonstrate independence with HEP in order to progress toward functional  independence. Met.        Long Term Goals:  12 weeks 9/3/19   1. Pain: Pt will demonstrate improved pain by reports of less than or equal to 2/10 worst pain on the verbal rating scale in order to progress toward maximal functional ability and improve QOL.   Met.     2. Function: Patient will demonstrate improved function as indicated by a score of less than or equal to 36% on the OPTIMAL.   Met.     3. Mobility: Patient will improve AROM to stated goals, listed in objective measures above, in order to return to maximal functional potential and improve quality of life. Progressing.     4. Strength: Patient will improve strength to stated goals, listed in objective measures above, in order to improve functional independence and quality of life. Progressing.     5. Gait: Patient will demonstrate normalized gait mechanics with minimal compensation in order to return to PLOF. Progressing.     6. Patient will return to normal ADL's, IADL's, community involvement, recreational activities, and work-related activities with less than or equal to 2/10 pain and maximal function.  Progressing.             Plan     Continue with POC and progress land based PT as the patient tolerates.        Rebecca Downing, PT

## 2019-10-03 ENCOUNTER — LAB VISIT (OUTPATIENT)
Dept: LAB | Facility: HOSPITAL | Age: 64
End: 2019-10-03
Attending: INTERNAL MEDICINE
Payer: COMMERCIAL

## 2019-10-03 DIAGNOSIS — E78.5 HYPERLIPIDEMIA, UNSPECIFIED HYPERLIPIDEMIA TYPE: ICD-10-CM

## 2019-10-03 PROCEDURE — 36415 COLL VENOUS BLD VENIPUNCTURE: CPT

## 2019-10-03 PROCEDURE — 80061 LIPID PANEL: CPT

## 2019-10-03 PROCEDURE — 84460 ALANINE AMINO (ALT) (SGPT): CPT

## 2019-10-04 ENCOUNTER — CLINICAL SUPPORT (OUTPATIENT)
Dept: REHABILITATION | Facility: HOSPITAL | Age: 64
End: 2019-10-04
Payer: COMMERCIAL

## 2019-10-04 DIAGNOSIS — M62.81 MUSCLE WEAKNESS (GENERALIZED): ICD-10-CM

## 2019-10-04 DIAGNOSIS — R26.89 POOR BALANCE: ICD-10-CM

## 2019-10-04 DIAGNOSIS — R26.89 IMPAIRED GAIT AND MOBILITY: ICD-10-CM

## 2019-10-04 LAB
ALT SERPL W/O P-5'-P-CCNC: 23 U/L (ref 10–44)
CHOLEST SERPL-MCNC: 138 MG/DL (ref 120–199)
CHOLEST/HDLC SERPL: 1.8 {RATIO} (ref 2–5)
HDLC SERPL-MCNC: 76 MG/DL (ref 40–75)
HDLC SERPL: 55.1 % (ref 20–50)
LDLC SERPL CALC-MCNC: 54.2 MG/DL (ref 63–159)
NONHDLC SERPL-MCNC: 62 MG/DL
TRIGL SERPL-MCNC: 39 MG/DL (ref 30–150)

## 2019-10-04 PROCEDURE — 97140 MANUAL THERAPY 1/> REGIONS: CPT | Performed by: PHYSICAL THERAPIST

## 2019-10-04 PROCEDURE — 97110 THERAPEUTIC EXERCISES: CPT | Performed by: PHYSICAL THERAPIST

## 2019-10-04 NOTE — PROGRESS NOTES
Physical Therapy Progress Note     Name: Kellie Beckett  Clinic Number: [id number]    Therapy Diagnosis:   Encounter Diagnoses   Name Primary?    Muscle weakness (generalized)     Impaired gait and mobility     Poor balance      Physician: Vikki Hamlin MD    Visit Date: 10/4/2019    Physician Orders: PT Eval and Treat; Schedule for aquatic therapy and also progress and educate on land based HEP. Needs general strengthening and lower quarter stability.  Medical Diagnosis from Referral:   M54.41,G89.29 (ICD-10-CM) - Chronic right-sided low back pain with right-sided sciatica   M17.0 (ICD-10-CM) - Primary osteoarthritis of both knees   M25.551 (ICD-10-CM) - Right hip pain      Evaluation Date: 8/5/2019  Authorization Period Expiration: 7/15/2020  Plan of Care Expiration: 10/28/19  Visit # / Visits authorized: 16 / 36    Time In: 10:00 am   Time Out: 11:00 am   Total Billable Time: 55 minutes    Precautions: Standard    Subjective     Pt reports: her knee is bothering her today again.    She was compliant with home exercise program.  Response to previous treatment: good  Functional change: still able to do more walking and better balance.    Pain: 0-1/10  Location: R knee    Objective         RANGE OF MOTION:     Lumbar ROM Right  (spine)  8/5/2019 Left     8/5/2019 Pain/Dysfunction with Movement Goal Right 9/3/19 Left 9/3/19   Lumbar Flexion (60) 75% ---   90% 90% ---   Lumbar Extension (30) 50% ---   75% 60% ---   Lumbar Side Bending (25) 25% 50% Pain to the R 75% 50% 50%   Lumbar Rotation 25% 25%   75% 50% 50%           STRENGTH:     L/E MMT Right  8/5/2019 Left  8/5/2019 Pain/Dysfunction with Movement Goal Right 9/3/19 Left   9/3/19 Right  10/4/19 Left 10/4/19   Hip Flexion  3/5 4/5   4+/5 B  3/5 4/5 3+/5 4/5   Hip Extension  3/5 3/5   4+/5 B 3+/5 3+/5 4-/5 4-/5   Hip Abduction  4-/5 4/5  5/5 B 4/5 4/5 4+/5 4/5   Knee Extension 4-/5 4/5   5/5 B 4/5 4/5 4/5 4/5   Knee Flexion 4/5 4/5 4+  5/5 B 4/5 4/5 4/5  4+/5   Hip IR 3/5 3/5   4/5 B 3+/5 4/5 --- ---   Hip ER 3+/5 3+/5   4/5 B 3+/5 4/5 --- ---   Ankle DF 4/5 4+/5   5/5 B --- --- --- ---   Ankle PF 4+/5   Seated 4+/5  Seated   5/5  Seated  --- --- --- ---   Ankle Inversion 4/5 4/5   5/5 B --- --- --- ---   Ankle Eversion 4/5 4/5   5/5 B --- --- --- ---      MUSCLE LENGTH:      Muscle Tested  Right  8/5/2019 Left   8/5/2019 Goal Right  9/3/19 Left   9/3/19 Right  10/4/19 Left 10/4/19   Hamstring  decreased decreased Normal B  normal normal --- ---   Hip flexor  decreased decreased Normal B decreased normal decreased ---         Sensation:  Sensation is intact to light touch     Palpation: Increased tone and tenderness noted with palpation of right gluteus abel, gluteus medius, piriformis and TFL. Increased tenderness noted with palpation of right PSIS at SIJ.      Posture:  Pt presents with postural abnormalities which include: trunk flexed posturing - improving     Gait Analysis: The patient ambulated with the following assistive device: none; the pt presents with the following gait abnormalities: decreased R knee flexion, decreased quinn and increased CONSUELO      Other: pitting edema B LE     FUNCTION:      OPTIMAL INSTRUMENT  The following scores are patient-reported values, with 1 representing the ability to do the activity without any difficulty, 2 representing the ability to do the activity with little difficulty, 3 representing the ability to do with moderate difficulty, 4 representing the ability to do the activity with much difficulty, 5 representing the inability to do do the activity, and 9 representing that the activity is not applicable.       8/5/2019 9/3/19 10/4/19   1.  Lying flat 2 1 1   2.  Rolling over 3 2 1   3.  Moving - lying to sitting 2 1 2   4.  Sitting             1 1 1   5.  Squatting 4 3 2   6.  Bending/stooping 3 3 2   7.  Balancing             3 2 1   8.  Kneeling             5 3 NA   9.  Standing 1 1 1   10.  Walking - short distance 2  "1 1   11.  Walking - long distance 4 3 2   12.  Walking - outdoors 3 2 1   13.  Climbing stairs 4 NA 2   14.  Hopping             5 NA NA   15.  Jumping             5 NA NA   16.  Running 5 NA NA   17.  Pushing             2 1 1   18.  Pulling             3 1 1   19.  Reaching             1 1 1   20.  Grasping             2 1 1   21.  Lifting 3 1 2   22.  Carrying             1 1 1      Patient reports 8.33% impairment on the Optimal Instrument on 10/4/19. (47.73% disability on 8/5/19, 15.28% on 9/3/19)        Juventino received therapeutic exercises to develop strength, endurance, ROM, flexibility, posture and core stabilization for 45 minutes including:  Bike, 8:00, Level 1, Level 2 for 3 minutes  Bridges, 2 x 10 with ball squeeze   Clamshells, 20x B LE  LTR, 20x B - deferred today   Toe yoga, 2 x 10 B LE - deferred today  Seated hip flex alt, 20x  - deferred today  Hooklying hip flex alternating, 10x B LE - deferred today  Belt block, 20x each - deferred today   Lynn slides, 10x R LE - deferred today  B prone quad stretch, 3  X30"  B SKTC/piriformis stretch, 3 x 30" with strap  PROM R hip abduction/IR/ER in supine and prone IR PROM with contract/relax method to increase hip IR. - deferred today   Hooklying marching, 20x B LE - deferred today  Hip add with ball, 20x - deferred today   DKTC with ball, 20x   B HSS with strap, 3  X 30"   Shuttle, L5, 3:00   Hip 3-way, 2 x 10, 2#   Glut sets, 20x - deferred today   MMT re-assessed    Juventino received manual therapy for 10 minutes including STM and strumming to the R knee at the medial joint line and surrounding musculature to decrease tissue tension and pain.      Juventino received cold pack for 10 minutes to R knee after session.  - deferred today    Juventino received moist heat for 15 minutes to the R LB/hip in S/L - deferred today.          Juventino received aquatic therapy with the use of water to decrease the pull of gravity and weightbearing forces on the body to increase " "tolerance to resisted therex to develop strength, endurance, ROM, flexibility, posture and core stabilization for deferred today including:  Use of 5# total weights, (2.5# on each LE)  Treadmill ambulation: Forward, 1.4 mph, 5 minutes  Treadmill ambulation: Sidestepping, 0.8 mph, 3 minutes each   Treadmill ambulation: Backward, 1.0 mph, 5 minutes  Standing Hamstring Curls, alternating B LE, 3 minutes  Standing alternating Marching, B LE 3 minutes; with UE alternating  Standing hip flex/ext/abd, B LE, 2 x 10   Alternating heel/toe raises, 2 minutes; with UE support   Push/pull with kickboard, 2:00 on TM level with slow pace to maintain balance and form   Flexion pull downs, 2 paddles, L3, 2:00   Trunk rotation, 2 paddles, L3, 2:00   Bicycle, 3:00 with noodle for support  B HSS on step, 1  X 30"  Hip circles, 1:00 each, CW/CCW on B LE  Scissors with use of noodle for support, horizontal and vertical, 2 minutes each       Home Exercises Provided and Patient Education Provided     Education provided:   Patient educated to perform her exercises she was issued by Dr. Hamlin at home on non-therapy days.      Written Home Exercises Provided: Patient instructed to cont prior HEP.  Exercises were reviewed and Juventino was able to demonstrate them prior to the end of the session.  Juventino demonstrated NA understanding of the education provided.     See EMR under NA for exercises provided NA.    Assessment   Juventino continues to progress well with PT.  She improved her functional score by 6.95%.  She does continue with limitations in her R hip but this is lessoning and she is reporting more abilities in walking longer and having better balance.      Juventino is progressing well towards her goals.  Pt prognosis is Good.     Pt will continue to benefit from skilled outpatient physical therapy to address the deficits listed in the problem list box on initial evaluation, provide pt/family education and to maximize pt's level of independence in " the home and community environment.     Pt's spiritual, cultural and educational needs considered and pt agreeable to plan of care and goals.     Anticipated barriers to physical therapy: co-morbidities and chronicity of condition    Goals:   Short Term Goals:  6 weeks 9/3/19 10/4/19   1. Pain: Pt will demonstrate improved pain by reports of less than or equal to 4/10 worst pain on the verbal rating scale in order to progress toward maximal functional ability and improve QOL. Met.   ---   2. Function: Patient will demonstrate improved function as indicated by a score of less than or equal to 42% on the OPTIMAL.   Met.   ---   3. Mobility: Patient will improve AROM to 50% of stated goals, listed in objective measures above, in order to progress towards independence with functional activities.  Progressing.   Progressing.     4. Strength: Patient will improve strength to 50% of stated goals, listed in objective measures above, in order to progress towards independence with functional activities.  Progressing.   Progressing.     5. Gait: Patient will demonstrate improved gait mechanics including increased quinn with increased knee flexion in order to improve functional mobility, improve quality of life, and decrease risk of further injury or fall.  Progressing.   Progressing.     6. HEP: Patient will demonstrate independence with HEP in order to progress toward functional independence. Met.   ---      Long Term Goals:  12 weeks 9/3/19 10/4/19   1. Pain: Pt will demonstrate improved pain by reports of less than or equal to 2/10 worst pain on the verbal rating scale in order to progress toward maximal functional ability and improve QOL.   Met.   ---   2. Function: Patient will demonstrate improved function as indicated by a score of less than or equal to 36% on the OPTIMAL.   Met.   ---   3. Mobility: Patient will improve AROM to stated goals, listed in objective measures above, in order to return to maximal functional  potential and improve quality of life. Progressing.   Progressing.     4. Strength: Patient will improve strength to stated goals, listed in objective measures above, in order to improve functional independence and quality of life. Progressing.   Progressing.     5. Gait: Patient will demonstrate normalized gait mechanics with minimal compensation in order to return to PLOF. Progressing.   Progressing.     6. Patient will return to normal ADL's, IADL's, community involvement, recreational activities, and work-related activities with less than or equal to 2/10 pain and maximal function.  Progressing.   Progressing.             Plan     Continue with Plan of Care with plan for patient to be discharged in the next few weeks to [facility].       Rebecca Downing, PT

## 2019-10-11 NOTE — PLAN OF CARE
Physical Therapy Progress Note     Name: Kellie Beckett  Clinic Number: [id number]    Therapy Diagnosis:   Encounter Diagnoses   Name Primary?    Muscle weakness (generalized)     Impaired gait and mobility     Poor balance      Physician: Vikki Hamlin MD    Visit Date: 10/4/2019    Physician Orders: PT Eval and Treat; Schedule for aquatic therapy and also progress and educate on land based HEP. Needs general strengthening and lower quarter stability.  Medical Diagnosis from Referral:   M54.41,G89.29 (ICD-10-CM) - Chronic right-sided low back pain with right-sided sciatica   M17.0 (ICD-10-CM) - Primary osteoarthritis of both knees   M25.551 (ICD-10-CM) - Right hip pain      Evaluation Date: 8/5/2019  Authorization Period Expiration: 7/15/2020  Plan of Care Expiration: 10/28/19  Visit # / Visits authorized: 16 / 36    Time In: 10:00 am   Time Out: 11:00 am   Total Billable Time: 55 minutes    Precautions: Standard    Subjective     Pt reports: her knee is bothering her today again.    She was compliant with home exercise program.  Response to previous treatment: good  Functional change: still able to do more walking and better balance.    Pain: 0-1/10  Location: R knee    Objective         RANGE OF MOTION:     Lumbar ROM Right  (spine)  8/5/2019 Left     8/5/2019 Pain/Dysfunction with Movement Goal Right 9/3/19 Left 9/3/19   Lumbar Flexion (60) 75% ---   90% 90% ---   Lumbar Extension (30) 50% ---   75% 60% ---   Lumbar Side Bending (25) 25% 50% Pain to the R 75% 50% 50%   Lumbar Rotation 25% 25%   75% 50% 50%           STRENGTH:     L/E MMT Right  8/5/2019 Left  8/5/2019 Pain/Dysfunction with Movement Goal Right 9/3/19 Left   9/3/19 Right  10/4/19 Left 10/4/19   Hip Flexion  3/5 4/5   4+/5 B  3/5 4/5 3+/5 4/5   Hip Extension  3/5 3/5   4+/5 B 3+/5 3+/5 4-/5 4-/5   Hip Abduction  4-/5 4/5  5/5 B 4/5 4/5 4+/5 4/5   Knee Extension 4-/5 4/5   5/5 B 4/5 4/5 4/5 4/5   Knee Flexion 4/5 4/5 4+  5/5 B 4/5 4/5 4/5  4+/5   Hip IR 3/5 3/5   4/5 B 3+/5 4/5 --- ---   Hip ER 3+/5 3+/5   4/5 B 3+/5 4/5 --- ---   Ankle DF 4/5 4+/5   5/5 B --- --- --- ---   Ankle PF 4+/5   Seated 4+/5  Seated   5/5  Seated  --- --- --- ---   Ankle Inversion 4/5 4/5   5/5 B --- --- --- ---   Ankle Eversion 4/5 4/5   5/5 B --- --- --- ---      MUSCLE LENGTH:      Muscle Tested  Right  8/5/2019 Left   8/5/2019 Goal Right  9/3/19 Left   9/3/19 Right  10/4/19 Left 10/4/19   Hamstring  decreased decreased Normal B  normal normal --- ---   Hip flexor  decreased decreased Normal B decreased normal decreased ---         Sensation:  Sensation is intact to light touch     Palpation: Increased tone and tenderness noted with palpation of right gluteus abel, gluteus medius, piriformis and TFL. Increased tenderness noted with palpation of right PSIS at SIJ.      Posture:  Pt presents with postural abnormalities which include: trunk flexed posturing - improving     Gait Analysis: The patient ambulated with the following assistive device: none; the pt presents with the following gait abnormalities: decreased R knee flexion, decreased quinn and increased CONSUELO      Other: pitting edema B LE     FUNCTION:      OPTIMAL INSTRUMENT  The following scores are patient-reported values, with 1 representing the ability to do the activity without any difficulty, 2 representing the ability to do the activity with little difficulty, 3 representing the ability to do with moderate difficulty, 4 representing the ability to do the activity with much difficulty, 5 representing the inability to do do the activity, and 9 representing that the activity is not applicable.       8/5/2019 9/3/19 10/4/19   1.  Lying flat 2 1 1   2.  Rolling over 3 2 1   3.  Moving - lying to sitting 2 1 2   4.  Sitting             1 1 1   5.  Squatting 4 3 2   6.  Bending/stooping 3 3 2   7.  Balancing             3 2 1   8.  Kneeling             5 3 NA   9.  Standing 1 1 1   10.  Walking - short distance 2  "1 1   11.  Walking - long distance 4 3 2   12.  Walking - outdoors 3 2 1   13.  Climbing stairs 4 NA 2   14.  Hopping             5 NA NA   15.  Jumping             5 NA NA   16.  Running 5 NA NA   17.  Pushing             2 1 1   18.  Pulling             3 1 1   19.  Reaching             1 1 1   20.  Grasping             2 1 1   21.  Lifting 3 1 2   22.  Carrying             1 1 1      Patient reports 8.33% impairment on the Optimal Instrument on 10/4/19. (47.73% disability on 8/5/19, 15.28% on 9/3/19)        Juventino received therapeutic exercises to develop strength, endurance, ROM, flexibility, posture and core stabilization for 45 minutes including:  Bike, 8:00, Level 1, Level 2 for 3 minutes  Bridges, 2 x 10 with ball squeeze   Clamshells, 20x B LE  LTR, 20x B - deferred today   Toe yoga, 2 x 10 B LE - deferred today  Seated hip flex alt, 20x  - deferred today  Hooklying hip flex alternating, 10x B LE - deferred today  Belt block, 20x each - deferred today   Lynn slides, 10x R LE - deferred today  B prone quad stretch, 3  X30"  B SKTC/piriformis stretch, 3 x 30" with strap  PROM R hip abduction/IR/ER in supine and prone IR PROM with contract/relax method to increase hip IR. - deferred today   Hooklying marching, 20x B LE - deferred today  Hip add with ball, 20x - deferred today   DKTC with ball, 20x   B HSS with strap, 3  X 30"   Shuttle, L5, 3:00   Hip 3-way, 2 x 10, 2#   Glut sets, 20x - deferred today   MMT re-assessed    Juventino received manual therapy for 10 minutes including STM and strumming to the R knee at the medial joint line and surrounding musculature to decrease tissue tension and pain.      Juventino received cold pack for 10 minutes to R knee after session.  - deferred today    Juventino received moist heat for 15 minutes to the R LB/hip in S/L - deferred today.          Juventino received aquatic therapy with the use of water to decrease the pull of gravity and weightbearing forces on the body to increase " "tolerance to resisted therex to develop strength, endurance, ROM, flexibility, posture and core stabilization for deferred today including:  Use of 5# total weights, (2.5# on each LE)  Treadmill ambulation: Forward, 1.4 mph, 5 minutes  Treadmill ambulation: Sidestepping, 0.8 mph, 3 minutes each   Treadmill ambulation: Backward, 1.0 mph, 5 minutes  Standing Hamstring Curls, alternating B LE, 3 minutes  Standing alternating Marching, B LE 3 minutes; with UE alternating  Standing hip flex/ext/abd, B LE, 2 x 10   Alternating heel/toe raises, 2 minutes; with UE support   Push/pull with kickboard, 2:00 on TM level with slow pace to maintain balance and form   Flexion pull downs, 2 paddles, L3, 2:00   Trunk rotation, 2 paddles, L3, 2:00   Bicycle, 3:00 with noodle for support  B HSS on step, 1  X 30"  Hip circles, 1:00 each, CW/CCW on B LE  Scissors with use of noodle for support, horizontal and vertical, 2 minutes each       Home Exercises Provided and Patient Education Provided     Education provided:   Patient educated to perform her exercises she was issued by Dr. Hamlin at home on non-therapy days.      Written Home Exercises Provided: Patient instructed to cont prior HEP.  Exercises were reviewed and Juventino was able to demonstrate them prior to the end of the session.  Juventino demonstrated NA understanding of the education provided.     See EMR under NA for exercises provided NA.    Assessment   Juventino continues to progress well with PT.  She improved her functional score by 6.95%.  She does continue with limitations in her R hip but this is lessoning and she is reporting more abilities in walking longer and having better balance.      Juventino is progressing well towards her goals.  Pt prognosis is Good.     Pt will continue to benefit from skilled outpatient physical therapy to address the deficits listed in the problem list box on initial evaluation, provide pt/family education and to maximize pt's level of independence in " the home and community environment.     Pt's spiritual, cultural and educational needs considered and pt agreeable to plan of care and goals.     Anticipated barriers to physical therapy: co-morbidities and chronicity of condition    Goals:   Short Term Goals:  6 weeks 9/3/19 10/4/19   1. Pain: Pt will demonstrate improved pain by reports of less than or equal to 4/10 worst pain on the verbal rating scale in order to progress toward maximal functional ability and improve QOL. Met.   ---   2. Function: Patient will demonstrate improved function as indicated by a score of less than or equal to 42% on the OPTIMAL.   Met.   ---   3. Mobility: Patient will improve AROM to 50% of stated goals, listed in objective measures above, in order to progress towards independence with functional activities.  Progressing.   Progressing.     4. Strength: Patient will improve strength to 50% of stated goals, listed in objective measures above, in order to progress towards independence with functional activities.  Progressing.   Progressing.     5. Gait: Patient will demonstrate improved gait mechanics including increased quinn with increased knee flexion in order to improve functional mobility, improve quality of life, and decrease risk of further injury or fall.  Progressing.   Progressing.     6. HEP: Patient will demonstrate independence with HEP in order to progress toward functional independence. Met.   ---      Long Term Goals:  12 weeks 9/3/19 10/4/19   1. Pain: Pt will demonstrate improved pain by reports of less than or equal to 2/10 worst pain on the verbal rating scale in order to progress toward maximal functional ability and improve QOL.   Met.   ---   2. Function: Patient will demonstrate improved function as indicated by a score of less than or equal to 36% on the OPTIMAL.   Met.   ---   3. Mobility: Patient will improve AROM to stated goals, listed in objective measures above, in order to return to maximal functional  potential and improve quality of life. Progressing.   Progressing.     4. Strength: Patient will improve strength to stated goals, listed in objective measures above, in order to improve functional independence and quality of life. Progressing.   Progressing.     5. Gait: Patient will demonstrate normalized gait mechanics with minimal compensation in order to return to PLOF. Progressing.   Progressing.     6. Patient will return to normal ADL's, IADL's, community involvement, recreational activities, and work-related activities with less than or equal to 2/10 pain and maximal function.  Progressing.   Progressing.             Plan     Continue with Plan of Care with plan for patient to be discharged in the next few weeks to [facility].       Rebecca Downing, PT

## 2019-10-14 ENCOUNTER — CLINICAL SUPPORT (OUTPATIENT)
Dept: REHABILITATION | Facility: HOSPITAL | Age: 64
End: 2019-10-14
Payer: COMMERCIAL

## 2019-10-14 DIAGNOSIS — M62.81 MUSCLE WEAKNESS (GENERALIZED): ICD-10-CM

## 2019-10-14 DIAGNOSIS — R26.89 POOR BALANCE: ICD-10-CM

## 2019-10-14 DIAGNOSIS — R26.89 IMPAIRED GAIT AND MOBILITY: ICD-10-CM

## 2019-10-14 PROCEDURE — 97110 THERAPEUTIC EXERCISES: CPT | Performed by: PHYSICAL THERAPIST

## 2019-10-14 PROCEDURE — 97140 MANUAL THERAPY 1/> REGIONS: CPT | Performed by: PHYSICAL THERAPIST

## 2019-10-15 NOTE — PROGRESS NOTES
"  Physical Therapy Daily Note     Name: Kellie Beckett  Clinic Number: [id number]    Therapy Diagnosis:   Encounter Diagnoses   Name Primary?    Muscle weakness (generalized)     Impaired gait and mobility     Poor balance      Physician: Vikki Hamlin MD    Visit Date: 10/14/2019    Physician Orders: PT Eval and Treat; Schedule for aquatic therapy and also progress and educate on land based HEP. Needs general strengthening and lower quarter stability.  Medical Diagnosis from Referral:   M54.41,G89.29 (ICD-10-CM) - Chronic right-sided low back pain with right-sided sciatica   M17.0 (ICD-10-CM) - Primary osteoarthritis of both knees   M25.551 (ICD-10-CM) - Right hip pain      Evaluation Date: 8/5/2019  Authorization Period Expiration: 7/15/2020  Plan of Care Expiration: 10/28/19  Visit # / Visits authorized: 17 / 36    Time In: 9:50 am   Time Out: 10:55 am   Total Billable Time: 55 minutes    Precautions: Standard    Subjective     Pt reports: her knee is still hurting some.     She was compliant with home exercise program.  Response to previous treatment: good  Functional change: still able to do more walking and better balance.    Pain: 2-3/10  Location: R knee    Objective     Juventino received therapeutic exercises to develop strength, endurance, ROM, flexibility, posture and core stabilization for 45 minutes including:  Bike, 8:00, Level 1, Level 2 for 4 minutes  Bridges, 2 x 10 with ball squeeze   Clamshells, 20x B LE  LTR, 20x B   Toe yoga, 2 x 10 B LE - deferred today  Seated hip flex alt, 20x  - deferred today  Hooklying hip flex alternating, 10x B LE - deferred today  Belt block, 20x each - deferred today   Lynn slides, 10x R LE - deferred today  B prone quad stretch, 3  X30"  B SKTC/piriformis stretch, 3 x 30" with strap  PROM R hip abduction/IR/ER in supine and prone IR PROM with contract/relax method to increase hip IR. - deferred today   Hooklying marching, 20x B LE - deferred today  Hip add with " "ball, 20x - deferred today   DKTC with ball, 20x   B HSS with strap, 3  X 30"   Shuttle, L5, 3:00   Hip 3-way, 2 x 10, 2#   Glut sets, 20x - deferred today       Juventino received manual therapy for 10 minutes including STM and strumming to the R knee at the medial joint line and surrounding musculature into the patellar tendon to decrease tissue tension and pain.      Juventino received cold pack for 10 minutes to R knee after session.  - deferred today    Juventino received moist heat for 15 minutes to the R LB/hip in S/L - deferred today.          Juventino received aquatic therapy with the use of water to decrease the pull of gravity and weightbearing forces on the body to increase tolerance to resisted therex to develop strength, endurance, ROM, flexibility, posture and core stabilization for deferred today including:  Use of 5# total weights, (2.5# on each LE)  Treadmill ambulation: Forward, 1.4 mph, 5 minutes  Treadmill ambulation: Sidestepping, 0.8 mph, 3 minutes each   Treadmill ambulation: Backward, 1.0 mph, 5 minutes  Standing Hamstring Curls, alternating B LE, 3 minutes  Standing alternating Marching, B LE 3 minutes; with UE alternating  Standing hip flex/ext/abd, B LE, 2 x 10   Alternating heel/toe raises, 2 minutes; with UE support   Push/pull with kickboard, 2:00 on TM level with slow pace to maintain balance and form   Flexion pull downs, 2 paddles, L3, 2:00   Trunk rotation, 2 paddles, L3, 2:00   Bicycle, 3:00 with noodle for support  B HSS on step, 1  X 30"  Hip circles, 1:00 each, CW/CCW on B LE  Scissors with use of noodle for support, horizontal and vertical, 2 minutes each       Home Exercises Provided and Patient Education Provided     Education provided:   Patient educated to perform her exercises she was issued by Dr. Hamlin at home on non-therapy days.      Written Home Exercises Provided: Patient instructed to cont prior HEP.  Exercises were reviewed and Juventino was able to demonstrate them prior to the end " of the session.  Juventino demonstrated NA understanding of the education provided.     See EMR under NA for exercises provided NA.    Assessment   Juventino tolerated session well with decreased tension in the R knee after stretching and manual tissue work.  She demonstrated less antalgic gait with the R LE leaving session.      Juventino is progressing well towards her goals.  Pt prognosis is Good.     Pt will continue to benefit from skilled outpatient physical therapy to address the deficits listed in the problem list box on initial evaluation, provide pt/family education and to maximize pt's level of independence in the home and community environment.     Pt's spiritual, cultural and educational needs considered and pt agreeable to plan of care and goals.     Anticipated barriers to physical therapy: co-morbidities and chronicity of condition    Goals:   Short Term Goals:  6 weeks 9/3/19 10/4/19   1. Pain: Pt will demonstrate improved pain by reports of less than or equal to 4/10 worst pain on the verbal rating scale in order to progress toward maximal functional ability and improve QOL. Met.   ---   2. Function: Patient will demonstrate improved function as indicated by a score of less than or equal to 42% on the OPTIMAL.   Met.   ---   3. Mobility: Patient will improve AROM to 50% of stated goals, listed in objective measures above, in order to progress towards independence with functional activities.  Progressing.   Progressing.     4. Strength: Patient will improve strength to 50% of stated goals, listed in objective measures above, in order to progress towards independence with functional activities.  Progressing.   Progressing.     5. Gait: Patient will demonstrate improved gait mechanics including increased quinn with increased knee flexion in order to improve functional mobility, improve quality of life, and decrease risk of further injury or fall.  Progressing.   Progressing.     6. HEP: Patient will demonstrate  independence with HEP in order to progress toward functional independence. Met.   ---      Long Term Goals:  12 weeks 9/3/19 10/4/19   1. Pain: Pt will demonstrate improved pain by reports of less than or equal to 2/10 worst pain on the verbal rating scale in order to progress toward maximal functional ability and improve QOL.   Met.   ---   2. Function: Patient will demonstrate improved function as indicated by a score of less than or equal to 36% on the OPTIMAL.   Met.   ---   3. Mobility: Patient will improve AROM to stated goals, listed in objective measures above, in order to return to maximal functional potential and improve quality of life. Progressing.   Progressing.     4. Strength: Patient will improve strength to stated goals, listed in objective measures above, in order to improve functional independence and quality of life. Progressing.   Progressing.     5. Gait: Patient will demonstrate normalized gait mechanics with minimal compensation in order to return to PLOF. Progressing.   Progressing.     6. Patient will return to normal ADL's, IADL's, community involvement, recreational activities, and work-related activities with less than or equal to 2/10 pain and maximal function.  Progressing.   Progressing.             Plan     Continue with Plan of Care with plan for patient to be discharged in the next few weeks to HEP.       Rebecca Downing, PT

## 2019-10-21 ENCOUNTER — CLINICAL SUPPORT (OUTPATIENT)
Dept: REHABILITATION | Facility: HOSPITAL | Age: 64
End: 2019-10-21
Payer: COMMERCIAL

## 2019-10-21 DIAGNOSIS — M62.81 MUSCLE WEAKNESS (GENERALIZED): ICD-10-CM

## 2019-10-21 DIAGNOSIS — R26.89 IMPAIRED GAIT AND MOBILITY: ICD-10-CM

## 2019-10-21 DIAGNOSIS — R26.89 POOR BALANCE: ICD-10-CM

## 2019-10-21 PROCEDURE — 97140 MANUAL THERAPY 1/> REGIONS: CPT | Performed by: PHYSICAL THERAPIST

## 2019-10-21 PROCEDURE — 97110 THERAPEUTIC EXERCISES: CPT | Performed by: PHYSICAL THERAPIST

## 2019-10-21 NOTE — PROGRESS NOTES
"  Physical Therapy Daily Note     Name: Kellie Beckett  Clinic Number: [id number]    Therapy Diagnosis:   Encounter Diagnoses   Name Primary?    Muscle weakness (generalized)     Impaired gait and mobility     Poor balance      Physician: Vikki Hamlin MD    Visit Date: 10/21/2019    Physician Orders: PT Eval and Treat; Schedule for aquatic therapy and also progress and educate on land based HEP. Needs general strengthening and lower quarter stability.  Medical Diagnosis from Referral:   M54.41,G89.29 (ICD-10-CM) - Chronic right-sided low back pain with right-sided sciatica   M17.0 (ICD-10-CM) - Primary osteoarthritis of both knees   M25.551 (ICD-10-CM) - Right hip pain      Evaluation Date: 8/5/2019  Authorization Period Expiration: 7/15/2020  Plan of Care Expiration: 10/28/19  Visit # / Visits authorized: 18 / 36    Time In: 10:00  am   Time Out: 11:00 am   Total Billable Time: 55 minutes    Precautions: Standard    Subjective     Pt reports: her knee is still hurting some.     She was compliant with home exercise program.  Response to previous treatment: good  Functional change: still able to do more walking and better balance.    Pain: 3/10  Location: R knee    Objective     Juventino received therapeutic exercises to develop strength, endurance, ROM, flexibility, posture and core stabilization for 45 minutes including:  Bike, 8:00, Level 1, Level 2 for 4 minutes  Bridges, 2 x 10 with ball squeeze - deferred today  Clamshells, 20x B LE  LTR, 20x B   Toe yoga, 2 x 10 B LE - deferred today  Seated hip flex alt, 20x  - deferred today  Hooklying hip flex alternating, 10x B LE - deferred today  Belt block, 20x each - deferred today   Lynn slides, 10x R LE - deferred today  B prone quad stretch, 3  X30"  B SKTC/piriformis stretch, 3 x 30" with strap - deferred today  PROM R hip abduction/IR/ER in supine and prone IR PROM with contract/relax method to increase hip IR. - deferred today   Hooklying marching, 20x B LE - " "deferred today  Hip add with ball, 20x - deferred today   DKTC with ball, 20x   B HSS with strap, 3  X 30"   Shuttle, L6, 3:00   Hip 3-way, 2 x 10, 2#   Glut sets, 20x - deferred today   Prone Hamstring Curls, 2 x 10, 2#       Juventino received manual therapy for 10 minutes including STM and strumming to the R knee at the medial joint line and surrounding musculature into the patellar tendon to decrease tissue tension and pain.      Juventino received cold pack for 10 minutes to R knee after session.  - deferred today    Juventino received moist heat for 15 minutes to the R LB/hip in S/L - deferred today.          Juventino received aquatic therapy with the use of water to decrease the pull of gravity and weightbearing forces on the body to increase tolerance to resisted therex to develop strength, endurance, ROM, flexibility, posture and core stabilization for deferred today including:  Use of 5# total weights, (2.5# on each LE)  Treadmill ambulation: Forward, 1.4 mph, 5 minutes  Treadmill ambulation: Sidestepping, 0.8 mph, 3 minutes each   Treadmill ambulation: Backward, 1.0 mph, 5 minutes  Standing Hamstring Curls, alternating B LE, 3 minutes  Standing alternating Marching, B LE 3 minutes; with UE alternating  Standing hip flex/ext/abd, B LE, 2 x 10   Alternating heel/toe raises, 2 minutes; with UE support   Push/pull with kickboard, 2:00 on TM level with slow pace to maintain balance and form   Flexion pull downs, 2 paddles, L3, 2:00   Trunk rotation, 2 paddles, L3, 2:00   Bicycle, 3:00 with noodle for support  B HSS on step, 1  X 30"  Hip circles, 1:00 each, CW/CCW on B LE  Scissors with use of noodle for support, horizontal and vertical, 2 minutes each       Home Exercises Provided and Patient Education Provided     Education provided:   Patient educated to perform her exercises she was issued by Dr. Hamlin at home on non-therapy days.      Written Home Exercises Provided: Patient instructed to cont prior HEP.  Exercises were " reviewed and Juventino was able to demonstrate them prior to the end of the session.  Juventino demonstrated NA understanding of the education provided.     See EMR under NA for exercises provided NA.    Assessment   Juventino tolerated session well with progressed resistance on the shuttle as well as added hamstring curls for increased knee stability.      Juventino is progressing well towards her goals.  Pt prognosis is Good.     Pt will continue to benefit from skilled outpatient physical therapy to address the deficits listed in the problem list box on initial evaluation, provide pt/family education and to maximize pt's level of independence in the home and community environment.     Pt's spiritual, cultural and educational needs considered and pt agreeable to plan of care and goals.     Anticipated barriers to physical therapy: co-morbidities and chronicity of condition    Goals:   Short Term Goals:  6 weeks 9/3/19 10/4/19   1. Pain: Pt will demonstrate improved pain by reports of less than or equal to 4/10 worst pain on the verbal rating scale in order to progress toward maximal functional ability and improve QOL. Met.   ---   2. Function: Patient will demonstrate improved function as indicated by a score of less than or equal to 42% on the OPTIMAL.   Met.   ---   3. Mobility: Patient will improve AROM to 50% of stated goals, listed in objective measures above, in order to progress towards independence with functional activities.  Progressing.   Progressing.     4. Strength: Patient will improve strength to 50% of stated goals, listed in objective measures above, in order to progress towards independence with functional activities.  Progressing.   Progressing.     5. Gait: Patient will demonstrate improved gait mechanics including increased quinn with increased knee flexion in order to improve functional mobility, improve quality of life, and decrease risk of further injury or fall.  Progressing.   Progressing.     6. HEP:  Patient will demonstrate independence with HEP in order to progress toward functional independence. Met.   ---      Long Term Goals:  12 weeks 9/3/19 10/4/19   1. Pain: Pt will demonstrate improved pain by reports of less than or equal to 2/10 worst pain on the verbal rating scale in order to progress toward maximal functional ability and improve QOL.   Met.   ---   2. Function: Patient will demonstrate improved function as indicated by a score of less than or equal to 36% on the OPTIMAL.   Met.   ---   3. Mobility: Patient will improve AROM to stated goals, listed in objective measures above, in order to return to maximal functional potential and improve quality of life. Progressing.   Progressing.     4. Strength: Patient will improve strength to stated goals, listed in objective measures above, in order to improve functional independence and quality of life. Progressing.   Progressing.     5. Gait: Patient will demonstrate normalized gait mechanics with minimal compensation in order to return to PLOF. Progressing.   Progressing.     6. Patient will return to normal ADL's, IADL's, community involvement, recreational activities, and work-related activities with less than or equal to 2/10 pain and maximal function.  Progressing.   Progressing.             Plan     Continue with Plan of Care with plan for patient to be discharged in the next few weeks to HEP.       Rebecca Downing, PT

## 2019-10-22 RX ORDER — NETARSUDIL AND LATANOPROST OPHTHALMIC SOLUTION, 0.02%/0.005% .2; .05 MG/ML; MG/ML
SOLUTION/ DROPS OPHTHALMIC; TOPICAL
COMMUNITY
Start: 2019-09-11 | End: 2020-03-12

## 2019-10-23 ENCOUNTER — CLINICAL SUPPORT (OUTPATIENT)
Dept: REHABILITATION | Facility: HOSPITAL | Age: 64
End: 2019-10-23
Payer: COMMERCIAL

## 2019-10-23 DIAGNOSIS — R26.89 IMPAIRED GAIT AND MOBILITY: ICD-10-CM

## 2019-10-23 DIAGNOSIS — R26.89 POOR BALANCE: ICD-10-CM

## 2019-10-23 DIAGNOSIS — M62.81 MUSCLE WEAKNESS (GENERALIZED): ICD-10-CM

## 2019-10-23 PROCEDURE — 97140 MANUAL THERAPY 1/> REGIONS: CPT | Performed by: PHYSICAL THERAPIST

## 2019-10-23 PROCEDURE — 97110 THERAPEUTIC EXERCISES: CPT | Performed by: PHYSICAL THERAPIST

## 2019-10-23 NOTE — PROGRESS NOTES
"  Physical Therapy Daily Note     Name: Kellie Beckett  Clinic Number: [id number]    Therapy Diagnosis:   Encounter Diagnoses   Name Primary?    Muscle weakness (generalized)     Impaired gait and mobility     Poor balance      Physician: Vikki Hamlin MD    Visit Date: 10/23/2019    Physician Orders: PT Eval and Treat; Schedule for aquatic therapy and also progress and educate on land based HEP. Needs general strengthening and lower quarter stability.  Medical Diagnosis from Referral:   M54.41,G89.29 (ICD-10-CM) - Chronic right-sided low back pain with right-sided sciatica   M17.0 (ICD-10-CM) - Primary osteoarthritis of both knees   M25.551 (ICD-10-CM) - Right hip pain      Evaluation Date: 8/5/2019  Authorization Period Expiration: 7/15/2020  Plan of Care Expiration: 10/28/19  Visit # / Visits authorized: 19 / 36    Time In: 11:45 am   Time Out: 12:45 pm   Total Billable Time: 55 minutes    Precautions: Standard    Subjective     Pt reports: she is a little achy but not anything severe.      She was compliant with home exercise program.  Response to previous treatment: good  Functional change: still able to do more walking and better balance.    Pain: 2/10  Location: R knee    Objective     Juventino received therapeutic exercises to develop strength, endurance, ROM, flexibility, posture and core stabilization for 45 minutes including:  Bike, 8:00, Level 1, Level 2 for 4 minutes  Bridges, 2 x 10 with ball squeeze - deferred today  Clamshells, 20x B LE  LTR, 20x B   Toe yoga, 2 x 10 B LE - deferred today  Seated hip flex alt, 20x  - deferred today  Hooklying hip flex alternating, 10x B LE - deferred today  Belt block, 20x each - deferred today   Lynn slides, 10x R LE - deferred today  B prone quad stretch, 3  X30"  B SKTC/piriformis stretch, 3 x 30" with strap - deferred today  PROM R hip abduction/IR/ER in supine and prone IR PROM with contract/relax method to increase hip IR. - deferred today   Hooklying " "marching, 20x B LE - deferred today  Hip add with ball, 20x - deferred today   DKTC with ball, 20x   B HSS with strap, 3  X 30"   Shuttle, L6, 3:00   Hip 3-way, 2 x 10, green band   Glut sets, 20x - deferred today   Prone Hamstring Curls, 2 x 10, 2#       Juventino received manual therapy for 10 minutes including STM and strumming to the R knee at the medial joint line and surrounding musculature into the patellar tendon to decrease tissue tension and pain.      Juventino received cold pack for 10 minutes to R knee after session.  - deferred today    Juventino received moist heat for 15 minutes to the R LB/hip in S/L - deferred today.          Juventino received aquatic therapy with the use of water to decrease the pull of gravity and weightbearing forces on the body to increase tolerance to resisted therex to develop strength, endurance, ROM, flexibility, posture and core stabilization for deferred today including:  Use of 5# total weights, (2.5# on each LE)  Treadmill ambulation: Forward, 1.4 mph, 5 minutes  Treadmill ambulation: Sidestepping, 0.8 mph, 3 minutes each   Treadmill ambulation: Backward, 1.0 mph, 5 minutes  Standing Hamstring Curls, alternating B LE, 3 minutes  Standing alternating Marching, B LE 3 minutes; with UE alternating  Standing hip flex/ext/abd, B LE, 2 x 10   Alternating heel/toe raises, 2 minutes; with UE support   Push/pull with kickboard, 2:00 on TM level with slow pace to maintain balance and form   Flexion pull downs, 2 paddles, L3, 2:00   Trunk rotation, 2 paddles, L3, 2:00   Bicycle, 3:00 with noodle for support  B HSS on step, 1  X 30"  Hip circles, 1:00 each, CW/CCW on B LE  Scissors with use of noodle for support, horizontal and vertical, 2 minutes each       Home Exercises Provided and Patient Education Provided     Education provided:   HEP issued with green theraband issued as well.     .      Written Home Exercises Provided: yes.  Exercises were reviewed and Juventino was able to demonstrate them " prior to the end of the session.  Juventino demonstrated good  understanding of the education provided.     See EMR under Patient Instructions for exercises provided 10/23/19.    Assessment   See discharge summary in treatment POC section.      Juventino is progressing well towards her goals.  Pt prognosis is Good.     Pt will continue to benefit from skilled outpatient physical therapy to address the deficits listed in the problem list box on initial evaluation, provide pt/family education and to maximize pt's level of independence in the home and community environment.     Pt's spiritual, cultural and educational needs considered and pt agreeable to plan of care and goals.     Anticipated barriers to physical therapy: co-morbidities and chronicity of condition    Goals:   Short Term Goals:  6 weeks 9/3/19 10/4/19   1. Pain: Pt will demonstrate improved pain by reports of less than or equal to 4/10 worst pain on the verbal rating scale in order to progress toward maximal functional ability and improve QOL. Met.   ---   2. Function: Patient will demonstrate improved function as indicated by a score of less than or equal to 42% on the OPTIMAL.   Met.   ---   3. Mobility: Patient will improve AROM to 50% of stated goals, listed in objective measures above, in order to progress towards independence with functional activities.  Progressing.   Progressing.     4. Strength: Patient will improve strength to 50% of stated goals, listed in objective measures above, in order to progress towards independence with functional activities.  Progressing.   Progressing.     5. Gait: Patient will demonstrate improved gait mechanics including increased quinn with increased knee flexion in order to improve functional mobility, improve quality of life, and decrease risk of further injury or fall.  Progressing.   Progressing.     6. HEP: Patient will demonstrate independence with HEP in order to progress toward functional independence. Met.    ---      Long Term Goals:  12 weeks 9/3/19 10/4/19   1. Pain: Pt will demonstrate improved pain by reports of less than or equal to 2/10 worst pain on the verbal rating scale in order to progress toward maximal functional ability and improve QOL.   Met.   ---   2. Function: Patient will demonstrate improved function as indicated by a score of less than or equal to 36% on the OPTIMAL.   Met.   ---   3. Mobility: Patient will improve AROM to stated goals, listed in objective measures above, in order to return to maximal functional potential and improve quality of life. Progressing.   Progressing.     4. Strength: Patient will improve strength to stated goals, listed in objective measures above, in order to improve functional independence and quality of life. Progressing.   Progressing.     5. Gait: Patient will demonstrate normalized gait mechanics with minimal compensation in order to return to PLOF. Progressing.   Progressing.     6. Patient will return to normal ADL's, IADL's, community involvement, recreational activities, and work-related activities with less than or equal to 2/10 pain and maximal function.  Progressing.   Progressing.             Plan     See discharge summary in treatment POC section.      Rebecca Downing, PT

## 2019-10-25 PROBLEM — M62.81 MUSCLE WEAKNESS (GENERALIZED): Status: RESOLVED | Noted: 2019-08-08 | Resolved: 2019-10-25

## 2019-10-25 PROBLEM — R26.89 POOR BALANCE: Status: RESOLVED | Noted: 2019-08-08 | Resolved: 2019-10-25

## 2019-10-25 PROBLEM — R26.89 IMPAIRED GAIT AND MOBILITY: Status: RESOLVED | Noted: 2019-08-08 | Resolved: 2019-10-25

## 2019-10-25 NOTE — PLAN OF CARE
Outpatient Therapy Discharge Summary     Name: Kellie Beckett  [facility] Number: [id number]    Therapy Diagnosis:   Encounter Diagnoses   Name Primary?    Muscle weakness (generalized)     Impaired gait and mobility     Poor balance      Physician: Vikki Hamlin MD    Physician Orders: PT Eval and Treat; Schedule for aquatic therapy and also progress and educate on land based HEP. Needs general strengthening and lower quarter stability.  Medical Diagnosis from Referral:   M54.41,G89.29 (ICD-10-CM) - Chronic right-sided low back pain with right-sided sciatica   M17.0 (ICD-10-CM) - Primary osteoarthritis of both knees   M25.551 (ICD-10-CM) - Right hip pain      Evaluation Date: 8/5/2019    Date of Last visit: 10/23/19  Total Visits Received: 19  Cancelled Visits: 4  No Show Visits: 0    Assessment    Goals:   Goals:   Short Term Goals:  6 weeks 9/3/19 10/4/19 10/23/19   1. Pain: Pt will demonstrate improved pain by reports of less than or equal to 4/10 worst pain on the verbal rating scale in order to progress toward maximal functional ability and improve QOL. Met.   --- ---   2. Function: Patient will demonstrate improved function as indicated by a score of less than or equal to 42% on the OPTIMAL.   Met.   --- ---   3. Mobility: Patient will improve AROM to 50% of stated goals, listed in objective measures above, in order to progress towards independence with functional activities.  Progressing.   Progressing.   Met   4. Strength: Patient will improve strength to 50% of stated goals, listed in objective measures above, in order to progress towards independence with functional activities.  Progressing.   Progressing.   Met.     5. Gait: Patient will demonstrate improved gait mechanics including increased uqinn with increased knee flexion in order to improve functional mobility, improve quality of life, and decrease risk of further injury or fall.  Progressing.   Progressing.   Met.     6. HEP: Patient will  demonstrate independence with HEP in order to progress toward functional independence. Met.   --- ---      Long Term Goals:  12 weeks 9/3/19 10/4/19 10/23/19   1. Pain: Pt will demonstrate improved pain by reports of less than or equal to 2/10 worst pain on the verbal rating scale in order to progress toward maximal functional ability and improve QOL.   Met.   --- ---   2. Function: Patient will demonstrate improved function as indicated by a score of less than or equal to 36% on the OPTIMAL.   Met.   --- ---   3. Mobility: Patient will improve AROM to stated goals, listed in objective measures above, in order to return to maximal functional potential and improve quality of life. Progressing.   Progressing.   Not met.     4. Strength: Patient will improve strength to stated goals, listed in objective measures above, in order to improve functional independence and quality of life. Progressing.   Progressing.   Not met.     5. Gait: Patient will demonstrate normalized gait mechanics with minimal compensation in order to return to PLOF. Progressing.   Progressing.   Not met.     6. Patient will return to normal ADL's, IADL's, community involvement, recreational activities, and work-related activities with less than or equal to 2/10 pain and maximal function.  Progressing.   Progressing.   Not met.              Discharge reason: Patient has reached the maximum rehab potential for the present time    Plan   This patient is discharged from Physical Therapy

## 2019-12-04 ENCOUNTER — TELEPHONE (OUTPATIENT)
Dept: INTERNAL MEDICINE | Facility: CLINIC | Age: 64
End: 2019-12-04

## 2019-12-04 NOTE — TELEPHONE ENCOUNTER
Informed pt that we do not have any availability until January and advised her to keep her appointment on 12/12/19, and schedule with another provider if she feels she needs to be seen sooner. She verbalized understanding.

## 2019-12-04 NOTE — TELEPHONE ENCOUNTER
----- Message from Neftali Rivera sent at 12/4/2019  9:09 AM CST -----  Contact: self  Type:  Sooner Apoointment Request    Caller is requesting a sooner appointment.  Caller declined first available appointment listed below.  Caller will not accept being placed on the waitlist and is requesting a message be sent to doctor.  Name of Caller:Kellie Beckett  When is the first available appointment? 01/03  Symptoms:n/a  Would the patient rather a call back or a response via [facility]? Call back  Best Call Back Number:[phone]  Additional Information: pt needs to be seen this week for er follow up    Thanks,  Neftali Rivera

## 2019-12-05 ENCOUNTER — OFFICE VISIT (OUTPATIENT)
Dept: INTERNAL MEDICINE | Facility: CLINIC | Age: 64
End: 2019-12-05
Payer: COMMERCIAL

## 2019-12-05 VITALS
HEIGHT: 64 IN | BODY MASS INDEX: 45.76 KG/M2 | WEIGHT: 268.06 LBS | OXYGEN SATURATION: 97 % | SYSTOLIC BLOOD PRESSURE: 160 MMHG | TEMPERATURE: 98 F | HEART RATE: 86 BPM | DIASTOLIC BLOOD PRESSURE: 92 MMHG

## 2019-12-05 DIAGNOSIS — I10 ESSENTIAL HYPERTENSION: Primary | ICD-10-CM

## 2019-12-05 PROBLEM — H81.10 BENIGN PAROXYSMAL POSITIONAL VERTIGO: Status: ACTIVE | Noted: 2019-12-05

## 2019-12-05 PROBLEM — K52.9 CHRONIC DIARRHEA: Status: ACTIVE | Noted: 2019-12-05

## 2019-12-05 PROBLEM — G89.29 CHRONIC PAIN: Status: ACTIVE | Noted: 2019-12-05

## 2019-12-05 PROCEDURE — 99999 PR PBB SHADOW E&M-EST. PATIENT-LVL III: CPT | Mod: PBBFAC,,, | Performed by: FAMILY MEDICINE

## 2019-12-05 PROCEDURE — 99999 PR PBB SHADOW E&M-EST. PATIENT-LVL III: ICD-10-PCS | Mod: PBBFAC,,, | Performed by: FAMILY MEDICINE

## 2019-12-05 PROCEDURE — 3080F DIAST BP >= 90 MM HG: CPT | Mod: CPTII,S$GLB,, | Performed by: FAMILY MEDICINE

## 2019-12-05 PROCEDURE — 3008F BODY MASS INDEX DOCD: CPT | Mod: CPTII,S$GLB,, | Performed by: FAMILY MEDICINE

## 2019-12-05 PROCEDURE — 3080F PR MOST RECENT DIASTOLIC BLOOD PRESSURE >= 90 MM HG: ICD-10-PCS | Mod: CPTII,S$GLB,, | Performed by: FAMILY MEDICINE

## 2019-12-05 PROCEDURE — 99214 OFFICE O/P EST MOD 30 MIN: CPT | Mod: S$GLB,,, | Performed by: FAMILY MEDICINE

## 2019-12-05 PROCEDURE — 3077F PR MOST RECENT SYSTOLIC BLOOD PRESSURE >= 140 MM HG: ICD-10-PCS | Mod: CPTII,S$GLB,, | Performed by: FAMILY MEDICINE

## 2019-12-05 PROCEDURE — 99214 PR OFFICE/OUTPT VISIT, EST, LEVL IV, 30-39 MIN: ICD-10-PCS | Mod: S$GLB,,, | Performed by: FAMILY MEDICINE

## 2019-12-05 PROCEDURE — 3008F PR BODY MASS INDEX (BMI) DOCUMENTED: ICD-10-PCS | Mod: CPTII,S$GLB,, | Performed by: FAMILY MEDICINE

## 2019-12-05 PROCEDURE — 3077F SYST BP >= 140 MM HG: CPT | Mod: CPTII,S$GLB,, | Performed by: FAMILY MEDICINE

## 2019-12-05 RX ORDER — CLONIDINE HYDROCHLORIDE 0.1 MG/1
0.1 TABLET ORAL EVERY 6 HOURS PRN
Qty: 60 TABLET | Refills: 0 | Status: SHIPPED | OUTPATIENT
Start: 2019-12-05 | End: 2021-01-13

## 2019-12-05 RX ORDER — AMLODIPINE BESYLATE 5 MG/1
5 TABLET ORAL DAILY
Qty: 90 TABLET | Refills: 0 | Status: SHIPPED | OUTPATIENT
Start: 2019-12-05 | End: 2019-12-12 | Stop reason: SDUPTHER

## 2019-12-05 NOTE — PROGRESS NOTES
"Subjective:   Patient ID:  Kellie Beckett is a 64 y.o. female.    Chief Complaint:  Hospital Follow Up (Pt BP was elevated.)    PCP Dr Cori FELDMAN f/u.   Had scheduled opthal surgery for glaucoma on Monday where they noticed elevated BP of 170s systolic during pre-op.   She was feeling lightheaded at [email or web address] the next day and checked her pressure and it was 197 systolic then re-checked and it was 180 something.   She decided to go to the ER then because she felt like she was "going to pass out".   ER work up was negative for ACS and they asked her to schedule an appointment with her PCP.  In past has been on a combo losartan 50 and hydrochlorothiazide 12.5 but her pressures were too low   She was on hydrochlorothiazide 25 mg and stopped when her pressures dropped too much.  Presently on HCTZ 12.5 mg daily.    Denies any previous treatment with calcium channel blockers or beta-blockers.    Hypertension   This is a chronic problem. The problem has been rapidly worsening since onset. The problem is uncontrolled. Associated symptoms include headaches. Pertinent negatives include no anxiety, blurred vision, chest pain, malaise/fatigue, neck pain, orthopnea, palpitations, peripheral edema, PND, shortness of breath or sweats. Agents associated with hypertension include NSAIDs. Risk factors for coronary artery disease include family history, obesity, post-menopausal state, sedentary lifestyle and stress. Past treatments include diuretics, lifestyle changes and angiotensin blockers. The current treatment provides moderate improvement. Compliance problems include medication side effects.  There is no history of angina, kidney disease, CAD/MI, CVA, heart failure, left ventricular hypertrophy, PVD or retinopathy.     Review of Systems   Constitutional: Negative for chills, diaphoresis, fatigue, fever and malaise/fatigue.   HENT: Negative for postnasal drip and rhinorrhea.    Eyes: Negative for blurred vision and visual " "disturbance.   Respiratory: Negative for cough, chest tightness, shortness of breath and wheezing.    Cardiovascular: Negative for chest pain, palpitations, orthopnea, leg swelling and PND.   Gastrointestinal: Negative for abdominal pain, constipation, diarrhea, nausea and vomiting.   Genitourinary: Negative for difficulty urinating, dysuria, frequency and hematuria.   Musculoskeletal: Negative for myalgias and neck pain.   Skin: Negative for rash.   Neurological: Positive for headaches. Negative for dizziness, tremors, seizures, syncope, light-headedness and numbness.   Psychiatric/Behavioral: Negative for sleep disturbance. The patient is not nervous/anxious.        Objective:   BP (!) 160/92 (BP Location: Left arm, Patient Position: Sitting, BP Method: Large (Manual))   Pulse 86   Temp 98 °F (36.7 °C) (Oral)   Ht 5' 4" (1.626 m)   Wt 121.6 kg (268 lb 1.3 oz)   SpO2 97%   BMI 46.02 kg/m²     Physical Exam   Constitutional: She appears well-developed and well-nourished. No distress.   Blood pressure uncontrolled.  Morbid obesity.   HENT:   Head: Normocephalic and atraumatic.   Mouth/Throat: Oropharynx is clear and moist.   Eyes: Pupils are equal, round, and reactive to light. Conjunctivae and EOM are normal.   Neck: Normal range of motion. Neck supple. No JVD present. No thyroid mass and no thyromegaly present.   Cardiovascular: Normal rate, regular rhythm, normal heart sounds and intact distal pulses. Exam reveals no gallop and no friction rub.   No murmur heard.  Pulmonary/Chest: Effort normal and breath sounds normal. No stridor. No respiratory distress. She has no wheezes. She has no rhonchi. She has no rales.   Abdominal: Soft. Bowel sounds are normal. She exhibits no distension. There is no tenderness. There is no guarding.   Musculoskeletal: She exhibits edema (Trace bilaterally lower extremity).   Skin: Skin is warm, dry and intact. No rash noted. She is not diaphoretic.   Psychiatric: She has a " normal mood and affect. Judgment normal.   Nursing note and vitals reviewed.    Assessment:     1. Essential hypertension      Plan:   Essential hypertension  -     amLODIPine (NORVASC) 5 MG tablet; Take 1 tablet (5 mg total) by mouth once daily.  Dispense: 90 tablet; Refill: 0  -     cloNIDine (CATAPRES) 0.1 MG tablet; Take 1 tablet (0.1 mg total) by mouth every 6 (six) hours as needed (SBP > 180 or DBP > 100).  Dispense: 60 tablet; Refill: 0    BP elevated.  Not at goal for treatment.    Discontinue HCTZ  Start amlodipine 5 mg daily   Clonidine 0.1 mg tablet every 6 hr as needed if greater than 180/100   Follow up Dr. Persaud 4 weeks.    I hereby acknowledge that I am relying upon documentation authored by a medical student working under my supervision and further I hereby attest that I have verified the student documentation or findings by personally re-performing the physical exam and medical decision making activities of the Evaluation and Management service to be billed.  Riccardo Johns

## 2019-12-12 ENCOUNTER — OFFICE VISIT (OUTPATIENT)
Dept: INTERNAL MEDICINE | Facility: CLINIC | Age: 64
End: 2019-12-12
Payer: COMMERCIAL

## 2019-12-12 ENCOUNTER — PATIENT MESSAGE (OUTPATIENT)
Dept: ADMINISTRATIVE | Facility: OTHER | Age: 64
End: 2019-12-12

## 2019-12-12 ENCOUNTER — PATIENT MESSAGE (OUTPATIENT)
Dept: INTERNAL MEDICINE | Facility: CLINIC | Age: 64
End: 2019-12-12

## 2019-12-12 VITALS
OXYGEN SATURATION: 98 % | DIASTOLIC BLOOD PRESSURE: 87 MMHG | SYSTOLIC BLOOD PRESSURE: 135 MMHG | WEIGHT: 267 LBS | HEART RATE: 85 BPM | BODY MASS INDEX: 45.83 KG/M2 | TEMPERATURE: 98 F

## 2019-12-12 DIAGNOSIS — E55.9 VITAMIN D DEFICIENCY: ICD-10-CM

## 2019-12-12 DIAGNOSIS — K91.2 HYPOGLYCEMIA AFTER GI (GASTROINTESTINAL) SURGERY: ICD-10-CM

## 2019-12-12 DIAGNOSIS — E78.5 HYPERLIPIDEMIA, UNSPECIFIED HYPERLIPIDEMIA TYPE: ICD-10-CM

## 2019-12-12 DIAGNOSIS — I10 ESSENTIAL HYPERTENSION: Primary | ICD-10-CM

## 2019-12-12 PROCEDURE — 3075F PR MOST RECENT SYSTOLIC BLOOD PRESS GE 130-139MM HG: ICD-10-PCS | Mod: CPTII,S$GLB,, | Performed by: INTERNAL MEDICINE

## 2019-12-12 PROCEDURE — 99213 PR OFFICE/OUTPT VISIT, EST, LEVL III, 20-29 MIN: ICD-10-PCS | Mod: S$GLB,,, | Performed by: INTERNAL MEDICINE

## 2019-12-12 PROCEDURE — 99999 PR PBB SHADOW E&M-EST. PATIENT-LVL III: ICD-10-PCS | Mod: PBBFAC,,, | Performed by: INTERNAL MEDICINE

## 2019-12-12 PROCEDURE — 3075F SYST BP GE 130 - 139MM HG: CPT | Mod: CPTII,S$GLB,, | Performed by: INTERNAL MEDICINE

## 2019-12-12 PROCEDURE — 3008F BODY MASS INDEX DOCD: CPT | Mod: CPTII,S$GLB,, | Performed by: INTERNAL MEDICINE

## 2019-12-12 PROCEDURE — 3008F PR BODY MASS INDEX (BMI) DOCUMENTED: ICD-10-PCS | Mod: CPTII,S$GLB,, | Performed by: INTERNAL MEDICINE

## 2019-12-12 PROCEDURE — 3079F DIAST BP 80-89 MM HG: CPT | Mod: CPTII,S$GLB,, | Performed by: INTERNAL MEDICINE

## 2019-12-12 PROCEDURE — 99999 PR PBB SHADOW E&M-EST. PATIENT-LVL III: CPT | Mod: PBBFAC,,, | Performed by: INTERNAL MEDICINE

## 2019-12-12 PROCEDURE — 99213 OFFICE O/P EST LOW 20 MIN: CPT | Mod: S$GLB,,, | Performed by: INTERNAL MEDICINE

## 2019-12-12 PROCEDURE — 3079F PR MOST RECENT DIASTOLIC BLOOD PRESSURE 80-89 MM HG: ICD-10-PCS | Mod: CPTII,S$GLB,, | Performed by: INTERNAL MEDICINE

## 2019-12-12 RX ORDER — AMLODIPINE BESYLATE 5 MG/1
5 TABLET ORAL DAILY
Qty: 90 TABLET | Refills: 0 | Status: SHIPPED | OUTPATIENT
Start: 2019-12-12 | End: 2020-02-21 | Stop reason: SDUPTHER

## 2019-12-13 ENCOUNTER — PATIENT OUTREACH (OUTPATIENT)
Dept: OTHER | Facility: OTHER | Age: 64
End: 2019-12-13

## 2019-12-13 RX ORDER — ROSUVASTATIN CALCIUM 10 MG/1
10 TABLET, COATED ORAL DAILY
Qty: 90 TABLET | Refills: 1 | Status: SHIPPED | OUTPATIENT
Start: 2019-12-13 | End: 2020-06-08

## 2019-12-13 NOTE — LETTER
December 13, 2019     Kellie Beckett  [address]  [place] [address]       Dear Kellie,    Welcome to [facility] Our goal is to make care effective, proactive and convenient by using data you send us from home to better treat your chronic conditions.              My name is Pao Norris, and I am your dedicated Digital Medicine clinician. As an expert in medication management, I will help ensure that the medications you are taking continue to provide the intended benefits and help you reach your goals. You can reach me directly at [phone] or by sending me a message directly through your [facility] account.      I am Maribell Pena and I will be your health . My job is to help you identify lifestyle changes to improve your disease control. We will talk about nutrition, exercise, and other ways you may be able to adjust your current habits to better your health. Additionally, we will help ensure you are completing the tests and screenings that are necessary to help manage your conditions. You can reach me directly at [phone] or by sending me a message directly through your [facility] account.    Most importantly, YOU are at the center of this team. Together, we will work to improve your overall health and encourage you to meet your goals for a healthier lifestyle.     What we expect from YOU:  · Please take frequent home blood pressure measurements. We ask that you take at least 1 blood pressure reading per week, but more information will better help us get you know you. Be sure you rest for a few minutes before taking the reading in a quiet, comfortable place.     Be available to receive phone calls or [facility] messages, when appropriate, from your care team. Please let us know if there are any specific days or times that work best for us to reach you via phone.     Complete routine tests and screenings. Dont worry, we will help keep you on track!           What  you should expect from your Digital Medicine Care Team:   We will work with you to create a personalized plan of care and provide you with encouragement and education, including regarding lifestyle changes, that could help you manage your disease states.     We will adjust your current medications, if needed, and continue to monitor your long-term progress.     We will provide you and your physician with monthly progress reports after you have been in the program for more than 30 days.     We will send you reminders through [facility] and text messages to help ensure you do not miss any testing deadlines to help manage your disease states.    You will be able to reach us by phone or through your [facility] account by clicking our names under Care Team on the right side of the home screen.    I look forward to working with you to achieve your blood pressure goals!    We look forward to working with you to help manage your health,    Sincerely,    Your Digital Medicine Team    Please visit our websites to learn more:   · Hypertension: [email or web address]      Remember, we are not available for emergencies. If you have an emergency, please contact your doctors office directly or call [facility] on-call ([phone] or [phone]) or [phone].

## 2019-12-13 NOTE — PROGRESS NOTES
"Digital Medicine: Health  Introduction    Introduced Kellie Beckett to Digital Medicine. Discussed health  role and recommended lifestyle modifications.    Stated she doesn't have a consistent "baseline" right now to go off of. 2-3 weeks ago she was feeling "funny" but her wrist cuff was telling her she was normal. She would check her readings elsewhere though and it was high (150s-160s). Had EKG and other tests done in ER. Saw Dr Johns instead of Dr Persaud and he put her on a medication. Saw Dr Persaud this week. Had bought herself cuff and the bottom numbers were lower (40s,50s,60s). Has been seeing inconsistency in readings between readings. Hasn't had the "funny" feeling since switching to new medication.     The history is provided by the patient.     HYPERTENSION  Our goal is to get BP to consistently below 130/80mmHg and make the process convenient so patient can avoid extra trips to the office. Getting your blood pressure below 130/80mmHg (definition of control) will reduce your risk for heart attack, kidney failure, stroke and death (as well as kidney failure, eye disease, & dementia)      Reviewed that the Digital Medicine care team - consisting of a clinician and a health  - will follow the most current evidence-based national guidelines for treating your condition.  The health  will focus on lifestyle modifications and motivation while the clinician will focus on medication therapy.  The care team will review all data on a regular basis and reach out as needed.      Explained that one of the key parts of the program is communication with the care team.  Asked patient to respond to outreach attempts and complete questionnaires.  Stressed importance of medication adherence.    Explained that we expect patient to obtain several blood pressures per week at random times of day.  Instructed patient not to allow anyone else to use phone and monitoring device.  Confirmed appropriate BP " monitoring technique.      Explained to patient that the digital medicine team is not available for emergencies.  Patient will call [facility] on-call ([phone] or [phone]) or [phone] if needed.      Patient's BP goal is 130/80.Patient's BP average is 120/70 mmHg, which is above goal, per 2017 ACC/AHA Hypertension Guidelines.          Last 5 Patient Entered Readings                                      Current 30 Day Average: 120/70     Recent Readings 12/13/2019 12/12/2019 12/12/2019 12/12/2019    SBP (mmHg) 126 114 103 121    DBP (mmHg) 77 66 72 66    Pulse 68 74 72 73            INTERVENTION(S)  reviewed monitoring technique    PLAN  patient verbalizes understanding      There are no preventive care reminders to display for this patient.    Reviewed the importance of self-monitoring, medication adherence, and that the health  can be used as a resource for lifestyle modifications to help reduce or maintain a healthy lifestyle.    Sent link to [facility] webpages and my contact information via [employer] for future questions. Follow up scheduled.         Screenings    SDOH

## 2019-12-13 NOTE — LETTER
December 13, 2019     Kellie Beckett  [address]  [place] [address]       Dear Kelile,    Welcome to [facility]! Our goal is to make care effective, proactive and convenient by using data you send us from home to better treat your chronic conditions.              My name is Pao Norris, and I am your dedicated Digital Medicine clinician. As an expert in medication management, I will help ensure that the medications you are taking continue to provide the intended benefits and help you reach your goals. You can reach me directly at [phone] or by sending me a message directly through your [facility] account.      I am Maribell Pena and I will be your health . My job is to help you identify lifestyle changes to improve your disease control. We will talk about nutrition, exercise, and other ways you may be able to adjust your current habits to better your health. Additionally, we will help ensure you are completing the tests and screenings that are necessary to help manage your conditions. You can reach me directly at [phone] or by sending me a message directly through your [facility] account.    Most importantly, YOU are at the center of this team. Together, we will work to improve your overall health and encourage you to meet your goals for a healthier lifestyle.     What we expect from YOU:  · Please take frequent home blood pressure measurements. We ask that you take at least 1 blood pressure reading per week, but more information will better help us get you know you. Be sure you rest for a few minutes before taking the reading in a quiet, comfortable place.     Be available to receive phone calls or [facility] messages, when appropriate, from your care team. Please let us know if there are any specific days or times that work best for us to reach you via phone.     Complete routine tests and screenings. Dont worry, we will help keep you on track!           What  you should expect from your Digital Medicine Care Team:   We will work with you to create a personalized plan of care and provide you with encouragement and education, including regarding lifestyle changes, that could help you manage your disease states.     We will adjust your current medications, if needed, and continue to monitor your long-term progress.     We will provide you and your physician with monthly progress reports after you have been in the program for more than 30 days.     We will send you reminders through [facility] and text messages to help ensure you do not miss any testing deadlines to help manage your disease states.    You will be able to reach us by phone or through your [facility] account by clicking our names under Care Team on the right side of the home screen.    I look forward to working with you to achieve your blood pressure goals!    We look forward to working with you to help manage your health,    Sincerely,    Your Digital Medicine Team    Please visit our websites to learn more:   · Hypertension: [email or web address]      Remember, we are not available for emergencies. If you have an emergency, please contact your doctors office directly or call [facility] on-call ([phone] or [phone]) or [phone].

## 2019-12-17 ENCOUNTER — TELEPHONE (OUTPATIENT)
Dept: INTERNAL MEDICINE | Facility: CLINIC | Age: 64
End: 2019-12-17

## 2019-12-17 NOTE — TELEPHONE ENCOUNTER
----- Message from Rebeca Ahuja sent at 12/16/2019 11:22 AM CST -----  Contact: Patient  Type: Needs Medical Advice    Who Called: Cristina  Symptoms (please be specific):  na  How long has patient had these symptoms:  na  Pharmacy name and phone #:    [id number] - [place] - [address] AT [facility]  [address]  [place] [address]  Phone: [phone] Fax: [phone]     Best Call Back Number: [phone] (home)    Additional Information: Patient states that her blood glucose testing strips were sent for the incorrect monitor, as well as sent to the wrong pharmacy. States that she would like to take the [facility] off of her record completely. Please call to update, thank you!

## 2019-12-17 NOTE — TELEPHONE ENCOUNTER
Spoke with pt and she said that all of her medication and the blood glucose strips were sent to the wrong pharmacy and that she did call and got it taken care of but the strips cost too much, therefore she stated that she was just going to order the strips online. I did verify with her the preferred pharmacies on her chart and she stated those were correct.

## 2019-12-17 NOTE — TELEPHONE ENCOUNTER
----- Message from Rebeca Ahuja sent at 12/16/2019 11:22 AM CST -----  Contact: Patient  Type: Needs Medical Advice    Who Called: Cristina  Symptoms (please be specific):  na  How long has patient had these symptoms:  na  Pharmacy name and phone #:    [id number] - [place] - [address] [facility]  [address]  [place] [address]  Phone: [phone] Fax: [phone]     Best Call Back Number: [phone] (home)    Additional Information: Patient states that her blood glucose testing strips were sent for the incorrect monitor, as well as sent to the wrong pharmacy. States that she would like to take the [facility] off of her record completely. Please call to update, thank you!

## 2019-12-19 DIAGNOSIS — I10 ESSENTIAL HYPERTENSION: ICD-10-CM

## 2019-12-19 RX ORDER — CLONIDINE HYDROCHLORIDE 0.1 MG/1
TABLET ORAL
Qty: 60 TABLET | Refills: 0 | OUTPATIENT
Start: 2019-12-19

## 2019-12-19 RX ORDER — DORZOLAMIDE HCL 20 MG/ML
SOLUTION/ DROPS OPHTHALMIC
COMMUNITY
Start: 2019-12-17 | End: 2021-01-13

## 2019-12-21 NOTE — PROGRESS NOTES
Subjective:      Patient ID: Kellie Beckett is a 64 y.o. female.    Chief Complaint: Follow-up    HPI     65 yo with   Patient Active Problem List   Diagnosis    Anemia    Essential hypertension    COAG (chronic open-angle glaucoma)    Morbid obesity with BMI of 40.0-44.9, adult    History of gastric bypass    At risk for coronary artery disease    Bilateral sensorineural hearing loss    Vitamin D deficiency    History of anemia    Chronic right-sided low back pain with right-sided sciatica    Primary osteoarthritis of both knees    Right hip pain    Benign paroxysmal positional vertigo    Chronic diarrhea    Chronic pain     Past Medical History:   Diagnosis Date    Anemia     iron and b12 deficiency    Glaucoma     Hypertension      Here today for f/u on htn.  Compliant with meds without significant side effects.       Review of Systems   Constitutional: Positive for activity change.   HENT: Negative for hearing loss, rhinorrhea and trouble swallowing.    Eyes: Positive for discharge. Negative for visual disturbance.   Respiratory: Negative for chest tightness and wheezing.    Cardiovascular: Negative for chest pain and palpitations.   Gastrointestinal: Negative for blood in stool, diarrhea and vomiting.   Endocrine: Negative for polydipsia and polyuria.   Genitourinary: Negative for difficulty urinating, dysuria, hematuria and menstrual problem.   Musculoskeletal: Positive for arthralgias. Negative for joint swelling and neck pain.   Psychiatric/Behavioral: Negative for confusion and dysphoric mood.     Objective:   /87   Pulse 85   Temp 97.9 °F (36.6 °C) (Tympanic)   Wt 121.1 kg (266 lb 15.6 oz)   SpO2 98%   BMI 45.83 kg/m²     Physical Exam   Constitutional: She appears well-developed and well-nourished. No distress.   Cardiovascular: Normal rate.   Pulmonary/Chest: Effort normal.   Musculoskeletal: She exhibits no edema.   Neurological: She is alert.   Skin: Skin is warm and  dry.   Psychiatric: She has a normal mood and affect. Her behavior is normal.       Assessment:     1. Essential hypertension    2. Vitamin D deficiency      Plan:   Essential hypertension  Home bp 120s to 150/ 49 to 70s.   Cont current meds  Monitor with dig htn.   -     amLODIPine (NORVASC) 5 MG tablet; Take 1 tablet (5 mg total) by mouth once daily.  Dispense: 90 tablet; Refill: 0  -     Hypertension Digital Medicine ([facility]) Enrollment Order  -     Hypertension Digital Medicine ([facility]): Assign Onboarding Questionnaires    Vitamin D deficiency            Problem List Items Addressed This Visit        Cardiac/Vascular    Essential hypertension - Primary    Relevant Medications    amLODIPine (NORVASC) 5 MG tablet    Other Relevant Orders    Hypertension Digital Medicine ([facility]) Enrollment Order (Completed)    Hypertension Digital Medicine ([facility]): Assign Onboarding Questionnaires (Completed)       Endocrine    Vitamin D deficiency          Follow up in about 3 months (around 3/12/2020), or if symptoms worsen or fail to improve.

## 2019-12-23 ENCOUNTER — PATIENT OUTREACH (OUTPATIENT)
Dept: ADMINISTRATIVE | Facility: HOSPITAL | Age: 64
End: 2019-12-23

## 2019-12-23 NOTE — PROGRESS NOTES
Called patient with reminder to schedule pap smear. Patient had last pap at Dr. Matson's office with BRET. I will send LYNNE by fax to request records.

## 2019-12-27 ENCOUNTER — PATIENT MESSAGE (OUTPATIENT)
Dept: ADMINISTRATIVE | Facility: OTHER | Age: 64
End: 2019-12-27

## 2019-12-27 ENCOUNTER — PATIENT OUTREACH (OUTPATIENT)
Dept: ADMINISTRATIVE | Facility: HOSPITAL | Age: 64
End: 2019-12-27

## 2019-12-27 NOTE — PROGRESS NOTES
Uploaded and scanned Colonoscopy procedure results into media manager. [facility]

## 2019-12-30 ENCOUNTER — PATIENT OUTREACH (OUTPATIENT)
Dept: OTHER | Facility: OTHER | Age: 64
End: 2019-12-30

## 2019-12-30 NOTE — PROGRESS NOTES
"Digital Medicine: Health  Follow-Up    Avg BP as of Dec 30, 2019 is 120/73.   Stated that she started in Aug/Sept going back and keeping track of food on [place]. She uses that everyday. She wanted to pay attention to things she was eating. Said her biggest issue is getting better about food. Retired in 2010 so became less active (moved from [place] back to [place]). Spending more time on computer now than before. 2015 began PT because she was having a lot of pain. Has been doing that since. 2016- fell going inside of a store which caused more pain in her knees. Dx with arthritis in knees and hips then (getting knee injections). A lot less active because of that. Changed to Dr Persaud in March 2019 and said she wanted to get into the aquatic therapy; finished that in Oct. Was going 2x/day. Was doing pool and gym for that. Averaging about 5000 steps a day, but has days where she'll get more. Tries not to overdue it because she can feel the pain. Switched going to the grocery every week from every month. Gets her to walk more. Has lost 22lbs since. Went to a Dietitian for 3 months. Went on BP medication years ago to figure out what was going on with it, but BP was getting too low.  changed BP meds but took her off losartan after a year. Stayed on "water pill" (hydrochloride) for the swelling of her ankles/legs. BP raised when she was having eye surgery and it stayed for a few days after (190s SBP).  said she was fine and go see PCP to see about changing BP meds. Started noticing BP was higher at PT. Now she's only on amlodipine. Was doing WW before.   Meal preps weekly.   Breakfast: uncured solomon, egg, sometimes with vegetables, Butterball turkey patties  Lunch: salad with grilled chicken; tuna in water or sardines, turkey deli meat with lettuce  Dinner: [place], trout, chicken, vegetables (lots of greens, sweet potatoes), frozen butter or lima beans  Snacks: walnuts, unsalted almonds, mixed berry Greek yogurt, " fruit and cheese  Drinks a lot of water, maybe a Diet Mt Dew every now and then for energy. Coffee in morning (sometimes decaf)  Cares for her aunt as well so takes her back and forth to the Dr 3-4x/week.      The history is provided by the patient.     Follow Up  Follow-up reason(s): routine education      Routine Education Topics: eating patterns and physical activity        INTERVENTION(S)  recommended diet modifications, recommend physical activity and encouragement/support    PLAN  patient verbalizes understanding      There are no preventive care reminders to display for this patient.    Last 5 Patient Entered Readings                                      Current 30 Day Average: 120/73     Recent Readings 12/25/2019 12/25/2019 12/19/2019 12/17/2019 12/13/2019    SBP (mmHg) 115 141 118 128 126    DBP (mmHg) 78 83 64 80 77    Pulse 65 66 71 72 68                      Diet Screening   Breakfast is typically between. Uncured solomon, egg with vegetables, butterball turkey patties.  Lunch is typically between. Salad with grilled chicken, tuna in water, sardines, turkey with lettuce.    Dinner is typically between. [place], trout, baked chicken, vegetables, beans 2x week.    Snacks are typically. Walnuts, fruit, cheese, unsalted almonds, Greek yogurt.    Patient reports eating or drinking the following: fruit, water, fresh vegetables, caffeine and deli meatShe has the following dietary restrictions: weight-loss and low sodium diet    She eats 3 meals and 2 snacks per day.  She does not skip meals regularly.  She has no standard fasting periods.      Patient did not have times when they could not afford food or ran out.      The patient states that she typically eats a non-home cooked meal (ex: dining out, take out, frozen meals) 0 times per week.  She cooks for self.    Patient does the shopping for groceries.  She gets groceries from the grocery store.      Barriers to a Healthy Diet: no barriers to healthy  eating    Assigning the following patient goals: maintain low sodium diet    Physical Activity Screening   When asked if exercising, patient responded: no  Patient has the following chronic pain: arthritis    She identified the following barriers to physical activity: pain/injury/recent surgery    Assigning the following patient goal(s): 150 minutes of exercise per week and participate in exercise weekly    Medication Adherence Screening   She misses doses: never      Adherence tools used: cell phone    Takes it at night to avoid drowsiness.     Tobacco and Alcohol Screening       Never used    No alcohol      SDOH

## 2020-01-09 ENCOUNTER — PATIENT OUTREACH (OUTPATIENT)
Dept: ADMINISTRATIVE | Facility: HOSPITAL | Age: 65
End: 2020-01-09

## 2020-01-11 ENCOUNTER — PATIENT MESSAGE (OUTPATIENT)
Dept: ADMINISTRATIVE | Facility: OTHER | Age: 65
End: 2020-01-11

## 2020-01-30 ENCOUNTER — TELEPHONE (OUTPATIENT)
Dept: OBSTETRICS AND GYNECOLOGY | Facility: CLINIC | Age: 65
End: 2020-01-30

## 2020-01-30 DIAGNOSIS — Z12.31 BREAST CANCER SCREENING BY MAMMOGRAM: Primary | ICD-10-CM

## 2020-01-30 NOTE — TELEPHONE ENCOUNTER
----- Message from Dinesh Sharma sent at 1/30/2020  1:52 PM CST -----  Contact: Pt   Pt called in regards to getting orders for a mammogram placed. Pt can be reached at [phone] (mobile)

## 2020-02-03 ENCOUNTER — PATIENT OUTREACH (OUTPATIENT)
Dept: OTHER | Facility: OTHER | Age: 65
End: 2020-02-03

## 2020-02-03 NOTE — PROGRESS NOTES
Digital Medicine: Health  Follow-Up    Stated that she was doing well. Still using [employer] to track everything. Still walking and doing exercise as much as she can. Taking care of a family member with cancer and taking her to appointments and things.     Avg BP as of Feb 3, 2020 is 126/78.     The history is provided by the patient.     Follow Up  Follow-up reason(s): routine education and goal follow-up      Routine Education Topics: eating patterns and physical activity        INTERVENTION(S)  encouragement/support and denied questions    PLAN  patient verbalizes understanding      There are no preventive care reminders to display for this patient.    Last 5 Patient Entered Readings                                      Current 30 Day Average: 126/78     Recent Readings 1/29/2020 1/21/2020 1/14/2020 1/7/2020 12/30/2019    SBP (mmHg) 126 130 118 131 124    DBP (mmHg) 75 83 76 78 77    Pulse 80 73 78 74 77                      Diet Screening   No change to diet.  She has the following dietary restrictions: low sodium dietShe cooks for self.    Patient does the shopping for groceries.      Barriers to a Healthy Diet: no barriers to healthy eating    Still using [employer] to track calories and food.     Assigning the following patient goals: track calories and maintain low sodium diet    Physical Activity Screening   No change to exercise routine.        Still walking and doing her exercises as much as she can.     Medication Adherence Screening   She misses doses: never        SDOH

## 2020-02-18 ENCOUNTER — PATIENT OUTREACH (OUTPATIENT)
Dept: ADMINISTRATIVE | Facility: HOSPITAL | Age: 65
End: 2020-02-18

## 2020-02-20 ENCOUNTER — PATIENT OUTREACH (OUTPATIENT)
Dept: OTHER | Facility: OTHER | Age: 65
End: 2020-02-20

## 2020-02-20 NOTE — PROGRESS NOTES
02/20/2020 11:52 AM Called patient and left voicemail with call back number. Will follow up with patient at next encounter.   03/12/2020 9:47 AM Called patient and left voicemail with call back number. Will follow up with patient at next encounter.

## 2020-03-02 ENCOUNTER — PATIENT OUTREACH (OUTPATIENT)
Dept: OTHER | Facility: OTHER | Age: 65
End: 2020-03-02

## 2020-03-02 NOTE — PROGRESS NOTES
Digital Medicine: Health  Follow-Up    Pt called me back. Stated that everything was going well, no major changes.     She is currently helping her aunt prep for surgery in the morning and she will be taking care of her for a few weeks. I pushed back my next contact date to 5 weeks instead of 4 just to be safe.     Avg BP as of Mar 2, 2020 is 131/78.      The history is provided by the patient.     Follow Up  Follow-up reason(s): routine education and goal follow-up      Routine Education Topics: eating patterns and physical activity        INTERVENTION(S)  encouragement/support, denied resources and denied questions    PLAN  patient verbalizes understanding      There are no preventive care reminders to display for this patient.    Last 5 Patient Entered Readings                                      Current 30 Day Average: 131/78     Recent Readings 2/29/2020 2/20/2020 2/20/2020 2/13/2020 2/13/2020    SBP (mmHg) 132 136 153 121 144    DBP (mmHg) 67 83 78 74 85    Pulse 77 74 68 66 72                      Diet Screening   No change to diet.      Physical Activity Screening   No change to exercise routine.        Medication Adherence Screening   She did not miss a dose this month.      SDOH

## 2020-03-12 ENCOUNTER — HOSPITAL ENCOUNTER (OUTPATIENT)
Dept: RADIOLOGY | Facility: HOSPITAL | Age: 65
Discharge: HOME | End: 2020-03-12
Attending: INTERNAL MEDICINE
Payer: MEDICARE

## 2020-03-12 ENCOUNTER — OFFICE VISIT (OUTPATIENT)
Dept: INTERNAL MEDICINE | Facility: CLINIC | Age: 65
End: 2020-03-12
Payer: MEDICARE

## 2020-03-12 VITALS
WEIGHT: 270.94 LBS | HEART RATE: 93 BPM | TEMPERATURE: 98 F | DIASTOLIC BLOOD PRESSURE: 94 MMHG | OXYGEN SATURATION: 98 % | SYSTOLIC BLOOD PRESSURE: 148 MMHG | BODY MASS INDEX: 46.51 KG/M2

## 2020-03-12 DIAGNOSIS — I10 ESSENTIAL HYPERTENSION: Primary | ICD-10-CM

## 2020-03-12 DIAGNOSIS — M79.672 LEFT FOOT PAIN: ICD-10-CM

## 2020-03-12 DIAGNOSIS — R09.89 DECREASED PEDAL PULSES: ICD-10-CM

## 2020-03-12 DIAGNOSIS — D64.9 ANEMIA, UNSPECIFIED TYPE: ICD-10-CM

## 2020-03-12 DIAGNOSIS — Z91.89 AT RISK FOR CORONARY ARTERY DISEASE: ICD-10-CM

## 2020-03-12 DIAGNOSIS — Z98.84 HISTORY OF GASTRIC BYPASS: ICD-10-CM

## 2020-03-12 DIAGNOSIS — E55.9 VITAMIN D DEFICIENCY: ICD-10-CM

## 2020-03-12 PROCEDURE — 73630 XR FOOT COMPLETE 3 VIEW LEFT: ICD-10-PCS | Mod: 26,LT,, | Performed by: RADIOLOGY

## 2020-03-12 PROCEDURE — 99999 PR PBB SHADOW E&M-EST. PATIENT-LVL V: CPT | Mod: PBBFAC,,, | Performed by: INTERNAL MEDICINE

## 2020-03-12 PROCEDURE — 73630 X-RAY EXAM OF FOOT: CPT | Mod: TC,LT

## 2020-03-12 PROCEDURE — 99215 OFFICE O/P EST HI 40 MIN: CPT | Mod: PBBFAC,25 | Performed by: INTERNAL MEDICINE

## 2020-03-12 PROCEDURE — 99214 OFFICE O/P EST MOD 30 MIN: CPT | Mod: S$PBB,,, | Performed by: INTERNAL MEDICINE

## 2020-03-12 PROCEDURE — 99999 PR PBB SHADOW E&M-EST. PATIENT-LVL V: ICD-10-PCS | Mod: PBBFAC,,, | Performed by: INTERNAL MEDICINE

## 2020-03-12 PROCEDURE — 73630 X-RAY EXAM OF FOOT: CPT | Mod: 26,LT,, | Performed by: RADIOLOGY

## 2020-03-12 PROCEDURE — 99214 PR OFFICE/OUTPT VISIT, EST, LEVL IV, 30-39 MIN: ICD-10-PCS | Mod: S$PBB,,, | Performed by: INTERNAL MEDICINE

## 2020-03-12 RX ORDER — NAPROXEN 500 MG/1
500 TABLET ORAL 2 TIMES DAILY WITH MEALS
Qty: 14 TABLET | Refills: 0 | Status: SHIPPED | OUTPATIENT
Start: 2020-03-12 | End: 2020-06-17

## 2020-03-12 RX ORDER — MELOXICAM 15 MG/1
15 TABLET ORAL DAILY
Qty: 7 TABLET | Refills: 0 | Status: SHIPPED | OUTPATIENT
Start: 2020-03-12 | End: 2020-03-12

## 2020-03-12 NOTE — PROGRESS NOTES
Subjective:      Patient ID: Kellie Beckett is a 65 y.o. female.    Chief Complaint: Follow-up    Foot Injury    Incident onset: 1 month. There was no injury mechanism. The pain is present in the left heel. The pain is moderate. The pain has been fluctuating since onset. Pertinent negatives include no inability to bear weight, loss of sensation, numbness or tingling. She reports no foreign bodies present. Exacerbated by: walking. She has tried nothing for the symptoms. The treatment provided no relief.        64 yo with   Patient Active Problem List   Diagnosis    Anemia    Essential hypertension    COAG (chronic open-angle glaucoma)    Morbid obesity with BMI of 40.0-44.9, adult    History of gastric bypass    At risk for coronary artery disease    Bilateral sensorineural hearing loss    Vitamin D deficiency    History of anemia    Chronic right-sided low back pain with right-sided sciatica    Primary osteoarthritis of both knees    Right hip pain    Benign paroxysmal positional vertigo    Chronic diarrhea    Chronic pain     Past Medical History:   Diagnosis Date    Anemia     iron and b12 deficiency    Glaucoma     Hypertension      Here today for management of mult med problems as outlined below. Pt c/o foot pain.  Compliant with meds without significant side effects.     Review of Systems   Constitutional: Negative for activity change and unexpected weight change.   HENT: Negative for hearing loss, rhinorrhea and trouble swallowing.    Eyes: Negative for discharge and visual disturbance.   Respiratory: Negative for chest tightness and wheezing.    Cardiovascular: Negative for chest pain and palpitations.   Gastrointestinal: Negative for blood in stool, constipation, diarrhea and vomiting.   Endocrine: Negative for polydipsia and polyuria.   Genitourinary: Negative for difficulty urinating, dysuria, hematuria and menstrual problem.   Musculoskeletal: Positive for arthralgias. Negative for  neck pain.   Skin: Negative for rash and wound.   Neurological: Negative for tingling, weakness and numbness.   Psychiatric/Behavioral: Negative for confusion and dysphoric mood.     Objective:   BP (!) 148/94 (BP Location: Left arm, Patient Position: Sitting, BP Method: Large (Manual))   Pulse 93   Temp 98.1 °F (36.7 °C) (Oral)   Wt 122.9 kg (270 lb 15.1 oz)   SpO2 98%   BMI 46.51 kg/m²     Physical Exam   Constitutional: She is oriented to person, place, and time. She appears well-developed and well-nourished. No distress.   HENT:   Head: Normocephalic and atraumatic.   Mouth/Throat: Oropharynx is clear and moist.   Eyes: Pupils are equal, round, and reactive to light. EOM are normal.   Neck: Neck supple. No thyromegaly present.   Cardiovascular: Normal rate and regular rhythm.   Pulmonary/Chest: Breath sounds normal. She has no wheezes. She has no rales.   Abdominal: Soft. Bowel sounds are normal. There is no tenderness.   Musculoskeletal:        Left foot: There is tenderness and swelling. There is normal range of motion and no bony tenderness.   Lymphadenopathy:     She has no cervical adenopathy.   Neurological: She is alert and oriented to person, place, and time.   Skin: Skin is warm and dry.   Psychiatric: She has a normal mood and affect. Her behavior is normal.       Assessment:     1. Essential hypertension    2. At risk for coronary artery disease    3. Anemia, unspecified type    4. Vitamin D deficiency    5. History of gastric bypass    6. Left foot pain    7. Decreased pedal pulses      Plan:   Essential hypertension  Continue current medications and digital medicine program  -     CBC auto differential; Future; Expected date: 06/12/2020  -     Comprehensive metabolic panel; Future; Expected date: 06/12/2020  -     Lipid panel; Future; Expected date: 06/10/2020  -     TSH; Future; Expected date: 06/12/2020    At risk for coronary artery disease  Continue statin  Anemia, unspecified  type    Vitamin D deficiency    History of gastric bypass    Left foot pain  -     Ambulatory referral/consult to Podiatry; Future; Expected date: 03/19/2020  -     X-Ray Foot Complete Left; Future; Expected date: 03/12/2020  -     Discontinue: meloxicam (MOBIC) 15 MG tablet; Take 1 tablet (15 mg total) by mouth once daily. For pain and inflammation  Dispense: 7 tablet; Refill: 0  -     naproxen (NAPROSYN) 500 MG tablet; Take 1 tablet (500 mg total) by mouth 2 (two) times daily with meals. For pain and inflammation.  Dispense: 14 tablet; Refill: 0    Decreased pedal pulses  -     Cancel: CV Ankle Brachial Indices W Stress W Toe Tracings; Future  -     CV Exercise KASI; Future        Lab Frequency Next Occurrence   Mammo Digital Screening Bilat With CAD Once 01/30/2020   CBC auto differential Once 06/12/2020   Comprehensive metabolic panel Once 06/12/2020   Lipid panel Once 06/10/2020   TSH Once 06/12/2020   Ambulatory referral/consult to Podiatry Once 03/19/2020       Problem List Items Addressed This Visit        Cardiac/Vascular    Essential hypertension - Primary    Relevant Orders    CBC auto differential    Comprehensive metabolic panel    Lipid panel    TSH    At risk for coronary artery disease    Overview     ascvd 6.8 to 7.8, 6/2019            Oncology    Anemia    Overview     iron and b12 deficiency  Pt reports on b12 since gastric bypass, but no h/o actual low b12 reading            Endocrine    History of gastric bypass    Vitamin D deficiency      Other Visit Diagnoses     Left foot pain        Relevant Medications    naproxen (NAPROSYN) 500 MG tablet    Other Relevant Orders    Ambulatory referral/consult to Podiatry    X-Ray Foot Complete Left (Completed)    Decreased pedal pulses        Relevant Orders    CV Exercise KASI (Completed)          Follow up in about 3 months (around 6/12/2020), or if symptoms worsen or fail to improve.

## 2020-03-12 NOTE — PROGRESS NOTES
Digital Medicine: Clinician Follow-Up    Called patient to follow up. Patient endorses adherence to medication regimen. Patient denies hypotensive s/sx (lightheadedness, dizziness, nausea, fatigue); patient denies hypertensive s/sx (SOB, CP, severe headaches, changes in vision). Instructed patient to seek medical care if BP > 180/110 and is accompanied by hypertensive s/sx associated, patient confirms understanding.     She reports that she sits at her dining table to check her BP and even empties her bladder prior to checking her BP. She denies any unwanted side effects from her medication.     She has an appointment with her PCP scheduled for today.    The history is provided by the patient. No  was used.     HYPERTENSION  Our goal is to get BP to consistently below 130/80mmHg and make the process convenient so patient can avoid extra trips to the office. Getting your blood pressure below 130/80mmHg (definition of control) will reduce your risk for heart attack, kidney failure, stroke and death (as well as kidney failure, eye disease, & dementia)      Explained that we expect patient to obtain several blood pressures per week at random times of day.  Instructed patient not to allow anyone else to use phone and monitoring device.  Confirmed appropriate BP monitoring technique.      Explained to patient that the digital medicine team is not available for emergencies.  Patient will call [facility] on-call ([phone] or [phone]) or [phone] if needed.    Patient's BP goal is 130/80. Patients BP average is 131/81 mmHg, which is above goal, per 2017 ACC/AHA Hypertension Guidelines.      Assessment:  Reviewed recent readings. Per 2017 ACC/ AHA HTN guidelines (goal of BP < 130/80), current 30-day average need to be addressed more throroughly today however BP is near goal.       Med Review complete.    Allergies reviewed.    Last 5 Patient Entered Readings                                      Current  30 Day Average: 131/81     Recent Readings 3/5/2020 3/5/2020 2/29/2020 2/20/2020 2/20/2020    SBP (mmHg) 135 150 132 136 153    DBP (mmHg) 85 85 67 83 78    Pulse 73 84 77 74 68          INTERVENTION(S)  reviewed appropriate dose schedule, reviewed monitoring technique, encouragement/support and goal setting    PLAN  patient verbalizes understanding and continue monitoring    >Continue current medication regimen.   >Reviewed proper BP measurement techniques, BP goals, and emergency precautions  >I will continue to monitor regularly and will follow-up in ~6 weeks, sooner if blood pressure begins to trend upward or downward.   >Patient denies having questions or concerns. Patient has my contact information and knows to call with any concerns or clinical changes.        There are no preventive care reminders to display for this patient.    Hypertension Medications             amLODIPine (NORVASC) 5 MG tablet Take 1 tablet (5 mg total) by mouth once daily.    cloNIDine (CATAPRES) 0.1 MG tablet Take 1 tablet (0.1 mg total) by mouth every 6 (six) hours as needed (SBP > 180 or DBP > 100).

## 2020-03-13 ENCOUNTER — HOSPITAL ENCOUNTER (OUTPATIENT)
Dept: CARDIOLOGY | Facility: HOSPITAL | Age: 65
Discharge: HOME | End: 2020-03-13
Attending: INTERNAL MEDICINE
Payer: MEDICARE

## 2020-03-13 VITALS — HEIGHT: 64 IN | WEIGHT: 270 LBS | BODY MASS INDEX: 46.1 KG/M2

## 2020-03-13 DIAGNOSIS — R09.89 DECREASED PEDAL PULSES: ICD-10-CM

## 2020-03-13 LAB
ABI CLAUDICATION TIME: 0 MIN
ABI POST MINUTES1: 5 MIN
IMMEDIATE ARM BP: 175 MMHG
IMMEDIATE LEFT ABI: 1.3
IMMEDIATE LEFT TIBIAL: 227 MMHG
IMMEDIATE RIGHT ABI: 1.29
IMMEDIATE RIGHT TIBIAL: 226 MMHG
LEFT ABI: 1.25
LEFT ARM BP: 138 MMHG
LEFT DORSALIS PEDIS: 172 MMHG
LEFT TBI: 1
LEFT TOE PRESSURE: 138 MMHG
POST1 ARM BP: 134 MMHG
POST1 LEFT ABI: 1.19
POST1 LEFT TIBIAL: 160 MMHG
POST1 RIGHT ABI: 1.5
POST1 RIGHT TIBIAL: 201 MMHG
RIGHT ABI: 1.62
RIGHT ARM BP: 134 MMHG
RIGHT DORSALIS PEDIS: 189 MMHG
RIGHT POSTERIOR TIBIAL: 224 MMHG
RIGHT TBI: 1.04
RIGHT TOE PRESSURE: 144 MMHG
TREADMILL GRADE: 0 %
TREADMILL SPEED: 2 MPH
TREADMILL TIME: 5 MIN

## 2020-03-13 PROCEDURE — 93924 CV US ANKLE / BRACHIAL INDICES W/ EXERCISE STRESS (CUPID ONLY): ICD-10-PCS | Mod: 26,,, | Performed by: INTERNAL MEDICINE

## 2020-03-13 PROCEDURE — 93924 LWR XTR VASC STDY BILAT: CPT

## 2020-03-13 PROCEDURE — 93924 LWR XTR VASC STDY BILAT: CPT | Mod: 26,,, | Performed by: INTERNAL MEDICINE

## 2020-03-16 ENCOUNTER — PATIENT OUTREACH (OUTPATIENT)
Dept: ADMINISTRATIVE | Facility: OTHER | Age: 65
End: 2020-03-16

## 2020-03-17 ENCOUNTER — OFFICE VISIT (OUTPATIENT)
Dept: PODIATRY | Facility: CLINIC | Age: 65
End: 2020-03-17
Payer: MEDICARE

## 2020-03-17 VITALS
DIASTOLIC BLOOD PRESSURE: 83 MMHG | SYSTOLIC BLOOD PRESSURE: 157 MMHG | HEART RATE: 76 BPM | HEIGHT: 64 IN | WEIGHT: 270.75 LBS | BODY MASS INDEX: 46.22 KG/M2

## 2020-03-17 DIAGNOSIS — M76.62 TENDONITIS, ACHILLES, LEFT: Primary | ICD-10-CM

## 2020-03-17 DIAGNOSIS — R60.0 BILATERAL LOWER EXTREMITY EDEMA: ICD-10-CM

## 2020-03-17 DIAGNOSIS — I87.2 VENOUS INSUFFICIENCY OF BOTH LOWER EXTREMITIES: ICD-10-CM

## 2020-03-17 PROCEDURE — 99999 PR PBB SHADOW E&M-EST. PATIENT-LVL IV: ICD-10-PCS | Mod: PBBFAC,,, | Performed by: PODIATRIST

## 2020-03-17 PROCEDURE — 99203 OFFICE O/P NEW LOW 30 MIN: CPT | Mod: S$PBB,,, | Performed by: PODIATRIST

## 2020-03-17 PROCEDURE — 99203 PR OFFICE/OUTPT VISIT, NEW, LEVL III, 30-44 MIN: ICD-10-PCS | Mod: S$PBB,,, | Performed by: PODIATRIST

## 2020-03-17 PROCEDURE — 99214 OFFICE O/P EST MOD 30 MIN: CPT | Mod: PBBFAC | Performed by: PODIATRIST

## 2020-03-17 PROCEDURE — 99999 PR PBB SHADOW E&M-EST. PATIENT-LVL IV: CPT | Mod: PBBFAC,,, | Performed by: PODIATRIST

## 2020-03-17 RX ORDER — LANOLIN ALCOHOL/MO/W.PET/CERES
400 CREAM (GRAM) TOPICAL DAILY
COMMUNITY
End: 2020-06-17

## 2020-03-17 NOTE — PROGRESS NOTES
[facility]  PODIATRIC MEDICINE AND SURGERY      CHIEF COMPLAINT   Chief Complaint   Patient presents with    Ankle Pain     Left ankle pain rated 7/10. Left foot x-rays prior. Left KASI 3/13/20         HPI  Kellie Beckett is a 65 y.o. female who presents today complaining of painful achilles /  posterior aspect of the left heel.  Pt states she has had this problem for months.  She has had long standing pain and numbness to left lower extremity. Per chart review, patient was evaluated for the same symptoms in 2014. She did see cardiologist and physiatrist. Per Dr. Hassan (2014)  Note in 2014:   1. Finger and toe tingling and numbness, intermittent, can not rule out stress and anxiety. S/p multiple negative workups.  2. Education for on diet and weight loss.  No further cardiac intervention.  RTC prn.    Pt states new onset of posterior heel pain for approximately 3 months. Symptoms are aggravated with ambulation. She also complains about lower extremity swelling. She denies any trauma. She rates pain 7/10. Patient denies other pedal complaints at this time.      PMH  Past Medical History:   Diagnosis Date    Anemia     iron and b12 deficiency    Glaucoma     Hypertension        PROBLEM LIST  Patient Active Problem List    Diagnosis Date Noted    Benign paroxysmal positional vertigo 12/05/2019    Chronic diarrhea 12/05/2019    Chronic pain 12/05/2019    Chronic right-sided low back pain with right-sided sciatica 07/16/2019    Primary osteoarthritis of both knees 07/16/2019    Right hip pain 07/16/2019    At risk for coronary artery disease 06/18/2019    History of gastric bypass 06/11/2019    Bilateral sensorineural hearing loss 06/06/2018    Vitamin D deficiency 05/09/2017    History of anemia 05/09/2017    Morbid obesity with BMI of 40.0-44.9, adult 06/06/2014    COAG (chronic open-angle glaucoma) 09/18/2013    Anemia     Essential hypertension        MEDS  Current Outpatient  Medications on File Prior to Visit   Medication Sig Dispense Refill    acetaminophen (TYLENOL) 325 MG tablet Take 325 mg by mouth as needed for Pain.      amLODIPine (NORVASC) 5 MG tablet Take 1 tablet (5 mg total) by mouth once daily. 90 tablet 0    bimatoprost (LUMIGAN OPHT) Apply 1 drop to eye once daily.      blood sugar diagnostic (BLOOD GLUCOSE TEST) Strp check sugars once daily for fluctuating blood sugars  Provide strips per patient's choice and insurance coverage 100 strip 0    COMBIGAN 0.2-0.5 % Drop Place 1 drop into both eyes 2 (two) times daily.       cyanocobalamin (VITAMIN B-12) 500 MCG tablet Take 500 mcg by mouth once daily.      dorzolamide (TRUSOPT) 2 % ophthalmic solution       magnesium oxide (MAG-OX) 400 mg (241.3 mg magnesium) tablet Take 400 mg by mouth once daily.      multivitamin capsule Take 1 capsule by mouth once daily.      naproxen (NAPROSYN) 500 MG tablet Take 1 tablet (500 mg total) by mouth 2 (two) times daily with meals. For pain and inflammation. 14 tablet 0    rosuvastatin (CRESTOR) 10 MG tablet Take 1 tablet (10 mg total) by mouth once daily. 90 tablet 1    cloNIDine (CATAPRES) 0.1 MG tablet Take 1 tablet (0.1 mg total) by mouth every 6 (six) hours as needed (SBP > 180 or DBP > 100). (Patient not taking: Reported on 3/17/2020) 60 tablet 0    naproxen sodium (ANAPROX) 220 MG tablet Take 220 mg by mouth as needed.       No current facility-administered medications on file prior to visit.        [facility]     Past Surgical History:   Procedure Laterality Date    GASTRIC BYPASS  2004    GLAUCOMA SURGERY      right eye        ALL  Review of patient's allergies indicates:  No Known Allergies    SOC     Social History     Tobacco Use    Smoking status: Never Smoker    Smokeless tobacco: Never Used   Substance Use Topics    Alcohol use: No     Frequency: Never     Binge frequency: Never    Drug use: No         Family HX    Family History   Problem Relation Age of Onset     "Cataracts Mother     Cancer Mother         bone    Cataracts Father     Glaucoma Father     Diabetes Father     Heart disease Father     Hypertension Father     Cancer Maternal Aunt         colon            REVIEW OF SYSTEMS  General: Denies any fever or chills  Chest: Denies shortness of breath, wheezing, coughing, or sputum production  Heart: Denies chest pain, cold extremities, orthopenia, or reduced exercise tolerance  As noted above and per history of current illness above, otherwise negative in the remainder of the 14 systems.     PHYSICAL EXAM  Vitals:    03/17/20 1323   BP: (!) 157/83   Pulse: 76   Weight: 122.8 kg (270 lb 11.6 oz)   Height: 5' 4" (1.626 m)   PainSc:   7   PainLoc: Ankle       General: This patient is well-developed, well-nourished and appears stated age, well-oriented to person, place and time, and cooperative and pleasant on today's visit    LOWER EXTREMITY PHYSICAL EXAM  VASCULAR  Dorsalis pedis and posterior tibial pulses palpable 2/4 bilaterally.   Capillary refill time immediate to the toes.   Feet are warm to the touch. Skin temperature warm to warm from proximally to distally   There are no varicosities, telangiectasias noted to bilateral foot and ankle regions.   There are no ecchymoses noted to bilateral foot and ankle regions.   There is +1 pitting gross lower extremity edema.    DERMATOLOGIC  Skin moist with healthy texture and turgor.  There are no open ulcerations, lacerations, or fissures to bilateral foot and ankle regions. There are no signs of infection as there is no erythema, no proximal-extending lymphangiitis, no fluctuance, or crepitus noted on palpation to bilateral foot and ankle regions.   There is no interdigital maceration.   There are no hyperkeratotic lesions noted to feet. Nails are well-trimmed.    NEUROLOGIC  Epicritic sensation is intact as the patient is able to sense light touch to bilateral foot and ankle regions.   Achilles and patellar deep " tendon reflexes intact  Babinski reflex absent    ORTHOPEDIC/BIOMECHANICAL  TTP posterior aspect heel at insertion of achilles and TTP of achilles tendon proper on LEFT foot.    Muscle strength AT/EHL/EDL/PT: 5/5; Achilles/Gastroc/Soleus: 5/5; PB/PL: 5/5 Muscle tone is normal.  Ankle joint ROM  B/L supple DF/PF, non-crepitus  STJ ROM supple inv/ev, non crepitus b/l.  On stance/Gait: able to stand, heel to toe gait. Able to weightbear, ambulate without assistance    IMAGING    Results for orders placed during the hospital encounter of 03/12/20   X-Ray Foot Complete Left    Narrative EXAMINATION:  XR FOOT COMPLETE 3 VIEW LEFT    CLINICAL HISTORY:  . Pain in left foot    TECHNIQUE:  AP, lateral, and oblique views of the foot were performed.    COMPARISON:  None    FINDINGS:  There is no evidence to suggest acute fracture or dislocation.  Minimal degenerative changes seen scattered throughout the foot including the 1st MTP joint and interphalangeal joints.  Small dorsal and plantar calcaneal enthesophytes are noted.  Vascular calcifications are present.      Impression As above      Electronically signed by: Eleuterio Angel DO  Date:    03/12/2020  Time:    14:55    left     No results found for this or any previous visit.  Right     ASSESSMENT  Tendonitis, Achilles, left  -     Ambulatory referral/consult to Podiatry    Bilateral lower extremity edema    Venous insufficiency of both lower extremities  -     COMPRESSION STOCKINGS        PLAN    1. Patient was educated about clinical and imaging findings, and verbalizes understanding of above.  2. Treatment plan:   - rest and icex 3 times daily/20 minutes, eccentric exercises/at home rehab instructions  A prescription for topical pain cream was provided with instructions.-Will order MRI or ultrasound for pathology of thickening of paratenon if no resolution of pain; physical therapy: cold therapy, ultrasound, stretching, extracorporal shockwave therapy  -if conservative  treatment fails, will consider excision of thickened or inflamed paratenon along with release of any associated adhesions and/or retrocalcaneal spur removal if applicable  -discussed edema control. Reviewed non invasive studies with patient and imaging. Recommend compression stockings for le edema.  3. RTC  for follow up/evaluation as scheduled, or sooner if any issues, questions or concerns.    Future Appointments   Date Time Provider Department Center   [facility]   6/6/2020  8:40 AM LABORATORY, [facility] LAB [facility]   6/12/2020  1:20 PM Hunter Persaud MD [facility]           Report Electronically Signed By:     Any Glez DPM   Podiatry  [facility]  3/18/2020

## 2020-03-17 NOTE — LETTER
March 18, 2020      Hunter Persaud MD  [address] [place] [address]           [facility]  [address] [address]  Phone: [phone]  Fax: [phone]          Patient: Kellie Beckett   MR Number: [id number]   YOB: 1955   Date of Visit: 3/17/2020       Dear Dr. Hunter Persaud:    Thank you for referring Kellie Beckett to me for evaluation. Attached you will find relevant portions of my assessment and plan of care.    If you have questions, please do not hesitate to call me. I look forward to following Kellie Beckett along with you.    Sincerely,    Any Glez, TANISHA    Enclosure  CC:  No Recipients    If you would like to receive this communication electronically, please contact [email or web address] or [phone] to request more information on [employer] access.    For providers and/or their staff who would like to refer a patient to [facility], please contact us through our one-stop-shop provider referral line, [facility], at [phone].    If you feel you have received this communication in error or would no longer like to receive these types of communications, please e-mail [email or web address]

## 2020-03-17 NOTE — PATIENT INSTRUCTIONS
Dear Kellie Beckett,     It was very nice meeting you today. I have enclosed further information about our visit today. Please let me know if you have any questions or concerns.    Achilles Tendonitis  Tendinitis is tendon inflammation, the body's natural response to injury or disease, which often causes swelling and pain. Achilles tendinitis results from repetitive stress to the tendon. This often happens when we do too much and too soon, but other factors can make it more likely to develop tendinitis, including increased physical activity, tight calf muscles, and weight gain. There are two types of Achilles tendinitis:    Non-Insertional Achilles Tendonitis  This type involves the mid-portion of the tendon, where the tendon sheath becomes inflamed and eventually adheres to the tendon. As the disease progresses, tendon fibers break down and tiny tears result (degeneration). The tendon swells, thickens, and may cause pain. This type most commonly affects younger, active people.    Insertional Achilles Tendonitis  This type involves the lower / back portion of the heel, where the tendon attaches (inserts) to the bone. Extra bone growth may occur at this site (spurs) and can be painful. This type may occur in patients of any age or activity level.     Nonsurgical Treatment  In most cases, nonsurgical treatment options will provide pain relief, although it may take months for symptoms to disappear. Even with early treatment, the pain may last longer than 3 months. If you have had pain for several months before seeking treatment, it may take 6 months before treatment methods take effect.  1. Rest. The first step in reducing pain is to decrease or even stop the activities that make the pain worse. If you regularly do high-impact exercises (such as running), switching to low-impact activities will put less stress on the Achilles tendon.  2. Ice. Place ice on the most painful area of the Achilles for up to 20 minutes  and should be stopped earlier if the skin becomes numb.   3. Non-steroidal anti-inflammatory medication. Drugs such as ibuprofen and naproxen reduce pain and swelling. They do not, however, reduce the thickening of the degenerated tendon. Do not take the medication for more than 3-4 weeks without consulting your doctor.  4. Orthotics (shoe inserts) or heel lift. Orthotics and / or heel lifts very helpful for patients with insertional tendinitis because they can move the heel away from the back of the shoe, where rubbing can occur. They also take some strain off the tendon.   5. Shoe modifications. Wear a shoe with an open heel counter (open at the back of the heel), which is helpful for insertional tendonitis. A clog may help with both types of tendonitis (the heel lift takes strain off the tendon).   6. Boot or cast protection. If your pain is severe, your doctor may recommend a walking boot or a cast for 4-6 weeks. This gives the tendon a chance to rest before any therapy is begun. Extended use of a boot is discouraged, though, because it can weaken your calf muscle.   7. Physical therapy / focused tendon exercises. Physical therapy is very helpful in treating Achilles tendinitis. It has proven to work better for non-insertional tendinitis than for insertional tendinitis. The following exercises can help to strengthen the calf muscles and reduce stress on the Achilles tendon.    8.   Hamstring stretch   You should try to reach your toes with the leg straight either when seated or standing to stretch the hamstring muscle. Hold the position for 20 seconds and relax. This should be repeated 10 to 15 times per day. Remember that proper stretching is gentle and should not be painful. These exercises need to be done on a consistent basis to be most beneficial.      Eccentric Strengthening Protocol  Eccentric strengthening is defined as jesusita (tightening) a muscle while it is getting longer. Eccentric strengthening  exercises can cause damage to the Achilles tendon if they are not done correctly. At first, they should be performed under the supervision of a physical therapist. Once mastered with a therapist, the exercises can then be done at home. These exercises may cause some discomfort, however, it should not be unbearable.    Surgical Treatment  Surgery should be considered to relieve Achilles tendinitis only if the pain does not improve after 6 months of nonsurgical treatment. The specific type of surgery depends on the location of the tendinitis and the amount of damage to the tendon.       Modified from:  American College of Foot and Ankle Surgeons  American Academy of Orthopedic Surgeons    You have been prescribed compression stockings. You can take the prescription to any one of the below locations to have it filled.      [facility]  [address]  [place] [address]  Phone: [phone]    [facility]  [address]  [place] [address] [phone]      You can also purchase Compression stockings: BELOW KNEE over the counter from your local [facility] for approximately $19.95 in either low, medium, or high grade compression.    Putting on Compression Stockings              Elastic compression stockings are prescribed to treat many vein problems. Wearing them may be the most important thing you do to manage your symptoms. The stockings fit tightly around your ankle, gradually reducing in pressure as they go up your legs. This helps keep blood flowing to your heart. As a result, swelling is reduced. Your doctor will prescribe stockings at a safe pressure for you. He or she will also tell you how often to wear and remove the stockings. Follow these instructions closely. Also, do not buy or wear compression stockings without first seeing your doctor.      Tips for Wear and Care  To wear stockings safely and to get the most benefit:  · Wear the length prescribed by your  doctor.  · Pull them to the designated height and no farther. Dont let them bunch at the top. This can restrict blood flow and increase swelling.  · Wear the stockings for the amount of time your doctor recommends. Replace them when they start to feel loose. This will likely be every 3 to 6 months.  · Remove them as your doctor directs. When removed, wash your legs. Then check your legs and feet for sores. Call your doctor if you find a sore. Dont put the stockings back on unless your doctor directs.  · Wash the stockings as instructed. They may need to be handwashed.  © 9702-1360 [employer]. [address] [place] [address], [place] [address]. All rights reserved. This information is not intended as a substitute for professional medical care. Always follow your healthcare professional's instructions.

## 2020-03-18 ENCOUNTER — PATIENT MESSAGE (OUTPATIENT)
Dept: PODIATRY | Facility: CLINIC | Age: 65
End: 2020-03-18

## 2020-03-27 ENCOUNTER — TELEPHONE (OUTPATIENT)
Dept: PODIATRY | Facility: CLINIC | Age: 65
End: 2020-03-27

## 2020-04-06 ENCOUNTER — PATIENT OUTREACH (OUTPATIENT)
Dept: OTHER | Facility: OTHER | Age: 65
End: 2020-04-06

## 2020-04-13 NOTE — PROGRESS NOTES
Digital Medicine: Health  Follow-Up    Stated that she was feeling well.     Had to go to the Dr about a month ago for left foot pain/swelling. Had x-rays and US, everything came back fine. Podiatrist said it was achelis tendinitis.     She thinks the amlodipine may have caused some swelling in her legs too. Thinks she had some muscle weakness from it too. She went back to the hydrochlorothiazide.     Trying to get extra steps so that she doesn't have the pain and stiffness. Walking more.    Has been cooking more. Trying to keep up her normal diet and eat a lot of vegetables. Used to do the [employer] as a quick meal and would notice her BP were higher after.      Avg BP as of Apr 13, 2020 is 129/72. Trending down. Stated that she believes her higher readings were stress induced. She had some readings with the Drs.     Her daughter has been helping her take care of her aunt who had surgery.     The history is provided by the patient.     Follow Up  Follow-up reason(s): reading review and routine education      Readings are trending down   Routine Education Topics: eating patterns and physical activity        INTERVENTION(S)  recommended diet modifications, encouragement/support, goal setting, denied resources and denied questions    PLAN  patient verbalizes understanding and continue monitoring      There are no preventive care reminders to display for this patient.    Last 5 Patient Entered Readings                                      Current 30 Day Average: 129/72     Recent Readings 4/7/2020 4/1/2020 3/24/2020 3/24/2020 3/24/2020    SBP (mmHg) 115 131 131 154 143    DBP (mmHg) 72 61 71 83 71    Pulse 75 78 71 74 74                      Diet Screening   Patient reports eating or drinking the following: fresh vegetablesShe has the following dietary restrictions: low sodium dietShe cooks for self.    Patient does the shopping for groceries.  She gets groceries from the grocery store.      Barriers to a  Healthy Diet: no barriers to healthy eating    Has been cooking more at home with everything. Less snacking and processed foods.     Assigning the following patient goals: maintain low sodium diet    Physical Activity Screening   When asked if exercising, patient responded: yes    Patient participates in the following activities: walking    She identified the following barriers to physical activity: no barriers to being active    Walking more so she doesn't stiffen up and have as much pain.     Assigning the following patient goal(s): 150 minutes of exercise per week and participate in exercise weekly    Medication Adherence Screening   She did not miss a dose this month.      SDOH

## 2020-04-23 ENCOUNTER — PATIENT OUTREACH (OUTPATIENT)
Dept: OTHER | Facility: OTHER | Age: 65
End: 2020-04-23

## 2020-04-23 NOTE — PROGRESS NOTES
Digital Medicine: Clinician Follow-Up    Called patient to follow up. Patient endorses adherence to medication regimen. Patient denies hypotensive s/sx (lightheadedness, dizziness, nausea, fatigue); patient denies hypertensive s/sx (SOB, CP, severe headaches, changes in vision). She reports that she has stopped taking amlodipine since she was informed that the medication can cause her foot to swell more than it has and resumed taking HCTZ 25 mg daily for the past 3 weeks. She is not interested in going to lab soon since she has a lab appointment scheduled for June 2020.    The history is provided by the patient. No  was used.     Follow Up  Follow-up reason(s): routine education      Assessment:  Reviewed recent readings. Per 2017 ACC/ AHA HTN guidelines (goal of BP < 130/80), current 30-day average is well controlled.     Last 5 Patient Entered Readings                                      Current 30 Day Average: 123/71     Recent Readings 4/16/2020 4/7/2020 4/1/2020 3/24/2020 3/24/2020    SBP (mmHg) 114 115 131 131 154    DBP (mmHg) 67 72 61 71 83    Pulse 68 75 78 71 74          INTERVENTION(S)  reviewed monitoring technique, encouragement/support, goal setting and denied questions    PLAN  patient verbalizes understanding and continue monitoring    >Continue current medication regimen and updated medication list.  >Provided patient with a list of high potassium foods and stressed the importance of adhering to such diet to minimize the risk hypokalemia.  >Will likely try to re-schedule lab for sooner date once COVID19 pandemic reduces.  >I will continue to monitor regularly and will follow-up in 7 weeks, sooner if blood pressure begins to trend upward or downward.   >Patient denies having questions or concerns. Patient has my contact information and knows to call with any concerns or clinical changes.        There are no preventive care reminders to display for this patient.    Hypertension  Medications             amLODIPine (NORVASC) 5 MG tablet Take 1 tablet (5 mg total) by mouth once daily.    cloNIDine (CATAPRES) 0.1 MG tablet Take 1 tablet (0.1 mg total) by mouth every 6 (six) hours as needed (SBP > 180 or DBP > 100).

## 2020-05-19 ENCOUNTER — HOSPITAL ENCOUNTER (OUTPATIENT)
Dept: RADIOLOGY | Facility: HOSPITAL | Age: 65
Discharge: HOME | End: 2020-05-19
Attending: OBSTETRICS & GYNECOLOGY
Payer: MEDICARE

## 2020-05-19 VITALS — WEIGHT: 270.75 LBS | HEIGHT: 64 IN | BODY MASS INDEX: 46.22 KG/M2

## 2020-05-19 DIAGNOSIS — Z12.31 BREAST CANCER SCREENING BY MAMMOGRAM: ICD-10-CM

## 2020-05-19 PROCEDURE — 77063 MAMMO DIGITAL SCREENING BILAT WITH TOMOSYNTHESIS_CAD: ICD-10-PCS | Mod: 26,,, | Performed by: RADIOLOGY

## 2020-05-19 PROCEDURE — 77063 BREAST TOMOSYNTHESIS BI: CPT | Mod: 26,,, | Performed by: RADIOLOGY

## 2020-05-19 PROCEDURE — 77067 SCR MAMMO BI INCL CAD: CPT | Mod: 26,,, | Performed by: RADIOLOGY

## 2020-05-19 PROCEDURE — 77067 SCR MAMMO BI INCL CAD: CPT | Mod: TC

## 2020-05-19 PROCEDURE — 77067 MAMMO DIGITAL SCREENING BILAT WITH TOMOSYNTHESIS_CAD: ICD-10-PCS | Mod: 26,,, | Performed by: RADIOLOGY

## 2020-05-29 ENCOUNTER — PATIENT OUTREACH (OUTPATIENT)
Dept: ADMINISTRATIVE | Facility: HOSPITAL | Age: 65
End: 2020-05-29

## 2020-05-29 DIAGNOSIS — Z78.0 ASYMPTOMATIC MENOPAUSE: ICD-10-CM

## 2020-05-29 DIAGNOSIS — E55.9 VITAMIN D DEFICIENCY: Primary | ICD-10-CM

## 2020-05-29 DIAGNOSIS — M89.9 BONE DISORDER: ICD-10-CM

## 2020-05-29 NOTE — PROGRESS NOTES
Spoke with pt regarding upcoming appt and overdue HM:     Health Maintenance: Updated  Immunizations: Abstracted  Care Everywhere: Abstracted   [facility] Search: Pt not found  Health Maintenance Due   Topic    Pneumococcal Vaccine (65+ Low/Medium Risk) (1 of 2 - PCV13)    DEXA SCAN        Dexa: Scheduled 06/09/2020.

## 2020-06-01 RX ORDER — VIT C/E/ZN/COPPR/LUTEIN/ZEAXAN 250MG-90MG
CAPSULE ORAL
COMMUNITY
Start: 2020-05-11 | End: 2022-08-11 | Stop reason: ALTCHOICE

## 2020-06-01 RX ORDER — HYDROCHLOROTHIAZIDE 12.5 MG/1
TABLET ORAL
COMMUNITY
Start: 2020-05-30 | End: 2020-06-17 | Stop reason: SDUPTHER

## 2020-06-01 RX ORDER — LIDOCAINE AND PRILOCAINE 25; 25 MG/G; MG/G
CREAM TOPICAL
COMMUNITY
Start: 2020-04-01

## 2020-06-04 ENCOUNTER — TELEPHONE (OUTPATIENT)
Dept: OBSTETRICS AND GYNECOLOGY | Facility: CLINIC | Age: 65
End: 2020-06-04

## 2020-06-04 NOTE — TELEPHONE ENCOUNTER
Contacted patient. Rescheduled patient's annual for 6/23/2020 at 10AM with caleb Brand NP. Patient verbalized understanding.

## 2020-06-09 ENCOUNTER — APPOINTMENT (OUTPATIENT)
Dept: RADIOLOGY | Facility: HOSPITAL | Age: 65
End: 2020-06-09
Attending: INTERNAL MEDICINE
Payer: MEDICARE

## 2020-06-09 DIAGNOSIS — Z78.0 ASYMPTOMATIC MENOPAUSE: ICD-10-CM

## 2020-06-09 PROCEDURE — 77080 DXA BONE DENSITY AXIAL: CPT | Mod: 26,,, | Performed by: RADIOLOGY

## 2020-06-09 PROCEDURE — 77080 DXA BONE DENSITY AXIAL: CPT | Mod: TC

## 2020-06-09 PROCEDURE — 77080 DEXA BONE DENSITY SPINE HIP: ICD-10-PCS | Mod: 26,,, | Performed by: RADIOLOGY

## 2020-06-17 ENCOUNTER — OFFICE VISIT (OUTPATIENT)
Dept: INTERNAL MEDICINE | Facility: CLINIC | Age: 65
End: 2020-06-17
Payer: MEDICARE

## 2020-06-17 VITALS
HEART RATE: 90 BPM | DIASTOLIC BLOOD PRESSURE: 78 MMHG | WEIGHT: 270.94 LBS | SYSTOLIC BLOOD PRESSURE: 128 MMHG | OXYGEN SATURATION: 97 % | BODY MASS INDEX: 46.51 KG/M2 | TEMPERATURE: 97 F

## 2020-06-17 DIAGNOSIS — Z91.89 AT RISK FOR CORONARY ARTERY DISEASE: ICD-10-CM

## 2020-06-17 DIAGNOSIS — I10 ESSENTIAL HYPERTENSION: Primary | ICD-10-CM

## 2020-06-17 DIAGNOSIS — Z23 NEED FOR PNEUMOCOCCAL VACCINATION: ICD-10-CM

## 2020-06-17 DIAGNOSIS — E78.5 HYPERLIPIDEMIA, UNSPECIFIED HYPERLIPIDEMIA TYPE: ICD-10-CM

## 2020-06-17 DIAGNOSIS — E55.9 VITAMIN D DEFICIENCY: ICD-10-CM

## 2020-06-17 PROCEDURE — G0009 ADMIN PNEUMOCOCCAL VACCINE: HCPCS | Mod: PBBFAC

## 2020-06-17 PROCEDURE — 99999 PR PBB SHADOW E&M-EST. PATIENT-LVL IV: ICD-10-PCS | Mod: PBBFAC,,, | Performed by: INTERNAL MEDICINE

## 2020-06-17 PROCEDURE — 99214 OFFICE O/P EST MOD 30 MIN: CPT | Mod: PBBFAC | Performed by: INTERNAL MEDICINE

## 2020-06-17 PROCEDURE — 99999 PR PBB SHADOW E&M-EST. PATIENT-LVL IV: CPT | Mod: PBBFAC,,, | Performed by: INTERNAL MEDICINE

## 2020-06-17 PROCEDURE — 99214 PR OFFICE/OUTPT VISIT, EST, LEVL IV, 30-39 MIN: ICD-10-PCS | Mod: S$PBB,,, | Performed by: INTERNAL MEDICINE

## 2020-06-17 PROCEDURE — 99214 OFFICE O/P EST MOD 30 MIN: CPT | Mod: S$PBB,,, | Performed by: INTERNAL MEDICINE

## 2020-06-17 RX ORDER — HYDROCHLOROTHIAZIDE 25 MG/1
25 TABLET ORAL DAILY
Qty: 90 TABLET | Refills: 1 | Status: SHIPPED | OUTPATIENT
Start: 2020-06-17 | End: 2020-11-24

## 2020-06-17 RX ORDER — AMLODIPINE BESYLATE 5 MG/1
5 TABLET ORAL DAILY
COMMUNITY
End: 2020-06-17

## 2020-06-17 RX ORDER — ROSUVASTATIN CALCIUM 10 MG/1
10 TABLET, COATED ORAL DAILY
Qty: 90 TABLET | Refills: 1 | Status: SHIPPED | OUTPATIENT
Start: 2020-06-17 | End: 2020-11-18

## 2020-06-17 NOTE — PROGRESS NOTES
Subjective:      Patient ID: Kellie Beckett is a 65 y.o. female.    Chief Complaint: Follow-up    HPI     66 yo with   Patient Active Problem List   Diagnosis    Anemia    Essential hypertension    COAG (chronic open-angle glaucoma)    Morbid obesity with BMI of 40.0-44.9, adult    History of gastric bypass    At risk for coronary artery disease    Bilateral sensorineural hearing loss    Vitamin D deficiency    History of anemia    Chronic right-sided low back pain with right-sided sciatica    Primary osteoarthritis of both knees    Right hip pain    Benign paroxysmal positional vertigo    Chronic diarrhea    Chronic pain     Past Medical History:   Diagnosis Date    Anemia     iron and b12 deficiency    Glaucoma     Hypertension      Here today for management of mult med problems as outlined below present for months to years.   Taking hctz 25 mg daily. Self dc'd amlodipine due to foot swelling. Back on vit d 1000 IU daily x one month.    Review of Systems   Constitutional: Negative for activity change and unexpected weight change.   HENT: Negative for hearing loss, rhinorrhea and trouble swallowing.    Eyes: Negative for discharge and visual disturbance.   Respiratory: Negative for chest tightness and wheezing.    Cardiovascular: Negative for chest pain and palpitations.   Gastrointestinal: Negative for blood in stool, constipation, diarrhea and vomiting.   Endocrine: Negative for polydipsia and polyuria.   Genitourinary: Negative for difficulty urinating, dysuria, hematuria and menstrual problem.   Musculoskeletal: Positive for arthralgias and joint swelling. Negative for neck pain.   Neurological: Negative for weakness and headaches.   Psychiatric/Behavioral: Negative for confusion and dysphoric mood.     Objective:   /78 (BP Location: Left arm, Patient Position: Sitting, BP Method: Large (Manual))   Pulse 90   Temp 97 °F (36.1 °C) (Tympanic)   Wt 122.9 kg (270 lb 15.1 oz)   SpO2  97%   BMI 46.51 kg/m²     Physical Exam  Constitutional:       General: She is not in acute distress.     Appearance: She is well-developed.   HENT:      Head: Normocephalic and atraumatic.   Eyes:      Pupils: Pupils are equal, round, and reactive to light.   Neck:      Musculoskeletal: Neck supple.      Thyroid: No thyromegaly.   Cardiovascular:      Rate and Rhythm: Normal rate and regular rhythm.   Pulmonary:      Breath sounds: Normal breath sounds. No wheezing or rales.   Abdominal:      General: Bowel sounds are normal.      Palpations: Abdomen is soft.      Tenderness: There is no abdominal tenderness.   Lymphadenopathy:      Cervical: No cervical adenopathy.   Skin:     General: Skin is warm and dry.   Neurological:      Mental Status: She is alert and oriented to person, place, and time.   Psychiatric:         Behavior: Behavior normal.       Lab Visit on 06/09/2020   Component Date Value Ref Range Status    WBC 06/09/2020 5.11  3.90 - 12.70 K/uL Final    RBC 06/09/2020 3.76* 4.00 - 5.40 M/uL Final    Hemoglobin 06/09/2020 10.9* 12.0 - 16.0 g/dL Final    Hematocrit 06/09/2020 37.0  37.0 - 48.5 % Final    Mean Corpuscular Volume 06/09/2020 98  82 - 98 fL Final    Mean Corpuscular Hemoglobin 06/09/2020 29.0  27.0 - 31.0 pg Final    Mean Corpuscular Hemoglobin Conc 06/09/2020 29.5* 32.0 - 36.0 g/dL Final    RDW 06/09/2020 12.8  11.5 - 14.5 % Final    Platelets 06/09/2020 303  150 - 350 K/uL Final    MPV 06/09/2020 11.1  9.2 - 12.9 fL Final    Immature Granulocytes 06/09/2020 0.2  0.0 - 0.5 % Final    Gran # (ANC) 06/09/2020 3.3  1.8 - 7.7 K/uL Final    Immature Grans (Abs) 06/09/2020 0.01  0.00 - 0.04 K/uL Final    Comment: Mild elevation in immature granulocytes is non specific and   can be seen in a variety of conditions including stress response,   acute inflammation, trauma and pregnancy. Correlation with other   laboratory and clinical findings is essential.      Lymph # 06/09/2020 1.2   1.0 - 4.8 K/uL Final    Mono # 06/09/2020 0.4  0.3 - 1.0 K/uL Final    Eos # 06/09/2020 0.2  0.0 - 0.5 K/uL Final    Baso # 06/09/2020 0.04  0.00 - 0.20 K/uL Final    nRBC 06/09/2020 0  0 /100 WBC Final    Gran% 06/09/2020 65.2  38.0 - 73.0 % Final    Lymph% 06/09/2020 22.5  18.0 - 48.0 % Final    Mono% 06/09/2020 7.6  4.0 - 15.0 % Final    Eosinophil% 06/09/2020 3.7  0.0 - 8.0 % Final    Basophil% 06/09/2020 0.8  0.0 - 1.9 % Final    Differential Method 06/09/2020 Automated   Final    Sodium 06/09/2020 139  136 - 145 mmol/L Final    Potassium 06/09/2020 4.2  3.5 - 5.1 mmol/L Final    Chloride 06/09/2020 103  95 - 110 mmol/L Final    CO2 06/09/2020 29  23 - 29 mmol/L Final    Glucose 06/09/2020 91  70 - 110 mg/dL Final    BUN, Bld 06/09/2020 16  8 - 23 mg/dL Final    Creatinine 06/09/2020 0.7  0.5 - 1.4 mg/dL Final    Calcium 06/09/2020 9.5  8.7 - 10.5 mg/dL Final    Total Protein 06/09/2020 6.9  6.0 - 8.4 g/dL Final    Albumin 06/09/2020 3.5  3.5 - 5.2 g/dL Final    Total Bilirubin 06/09/2020 0.6  0.1 - 1.0 mg/dL Final    Comment: For infants and newborns, interpretation of results should be based  on gestational age, weight and in agreement with clinical  observations.  Premature Infant recommended reference ranges:  Up to 24 hours.............<8.0 mg/dL  Up to 48 hours............<12.0 mg/dL  3-5 days..................<15.0 mg/dL  6-29 days.................<15.0 mg/dL      Alkaline Phosphatase 06/09/2020 92  55 - 135 U/L Final    AST 06/09/2020 23  10 - 40 U/L Final    ALT 06/09/2020 30  10 - 44 U/L Final    Anion Gap 06/09/2020 7* 8 - 16 mmol/L Final    eGFR if African American 06/09/2020 >60.0  >60 mL/min/1.73 m^2 Final    eGFR if non African American 06/09/2020 >60.0  >60 mL/min/1.73 m^2 Final    Comment: Calculation used to obtain the estimated glomerular filtration  rate (eGFR) is the CKD-EPI equation.       Cholesterol 06/09/2020 133  120 - 199 mg/dL Final    Comment: The  National Cholesterol Education Program (NCEP) has set the  following guidelines (reference ranges) for Cholesterol:  Optimal.....................<200 mg/dL  Borderline High.............200-239 mg/dL  High........................> or = 240 mg/dL      Triglycerides 06/09/2020 38  30 - 150 mg/dL Final    Comment: The National Cholesterol Education Program (NCEP) has set the  following guidelines (reference values) for triglycerides:  Normal......................<150 mg/dL  Borderline High.............150-199 mg/dL  High........................200-499 mg/dL      HDL 06/09/2020 80* 40 - 75 mg/dL Final    Comment: The National Cholesterol Education Program (NCEP) has set the  following guidelines (reference values) for HDL Cholesterol:  Low...............<40 mg/dL  Optimal...........>60 mg/dL      LDL Cholesterol 06/09/2020 45.4* 63.0 - 159.0 mg/dL Final    Comment: The National Cholesterol Education Program (NCEP) has set the  following guidelines (reference values) for LDL Cholesterol:  Optimal.......................<130 mg/dL  Borderline High...............130-159 mg/dL  High..........................160-189 mg/dL  Very High.....................>190 mg/dL      Hdl/Cholesterol Ratio 06/09/2020 60.2* 20.0 - 50.0 % Final    Total Cholesterol/HDL Ratio 06/09/2020 1.7* 2.0 - 5.0 Final    Non-HDL Cholesterol 06/09/2020 53  mg/dL Final    Comment: Risk category and Non-HDL cholesterol goals:  Coronary heart disease (CHD)or equivalent (10-year risk of CHD >20%):  Non-HDL cholesterol goal     <130 mg/dL  Two or more CHD risk factors and 10-year risk of CHD <= 20%:  Non-HDL cholesterol goal     <160 mg/dL  0 to 1 CHD risk factor:  Non-HDL cholesterol goal     <190 mg/dL      TSH 06/09/2020 1.032  0.400 - 4.000 uIU/mL Final       Assessment:     1. Essential hypertension    2. At risk for coronary artery disease    3. Vitamin D deficiency    4. Hyperlipidemia, unspecified hyperlipidemia type    5. Need for pneumococcal  vaccination      Plan:   Essential hypertension  Controlled  Cont current meds  -     hydroCHLOROthiazide (HYDRODIURIL) 25 MG tablet; Take 1 tablet (25 mg total) by mouth once daily.  Dispense: 90 tablet; Refill: 1  -     Basic metabolic panel; Future; Expected date: 07/01/2020    At risk for coronary artery disease  Continue statin    Vitamin D deficiency  -     Vitamin D; Future; Expected date: 07/01/2020    Hyperlipidemia, unspecified hyperlipidemia type  -     rosuvastatin (CRESTOR) 10 MG tablet; Take 1 tablet (10 mg total) by mouth once daily.  Dispense: 90 tablet; Refill: 1    Need for pneumococcal vaccination  -     (In Office Administered) Pneumococcal Conjugate Vaccine (13 Valent) (IM)            Problem List Items Addressed This Visit        Cardiac/Vascular    Essential hypertension - Primary    Relevant Medications    hydroCHLOROthiazide (HYDRODIURIL) 25 MG tablet    Other Relevant Orders    Basic metabolic panel    At risk for coronary artery disease    Overview     ascvd 6.8 to 7.8, 6/2019            Endocrine    Vitamin D deficiency    Relevant Orders    Vitamin D      Other Visit Diagnoses     Hyperlipidemia, unspecified hyperlipidemia type        Relevant Medications    rosuvastatin (CRESTOR) 10 MG tablet    Need for pneumococcal vaccination        Relevant Orders    (In Office Administered) Pneumococcal Conjugate Vaccine (13 Valent) (IM) (Completed)          Follow up in about 6 months (around 12/17/2020), or if symptoms worsen or fail to improve.

## 2020-06-23 ENCOUNTER — OFFICE VISIT (OUTPATIENT)
Dept: OBSTETRICS AND GYNECOLOGY | Facility: CLINIC | Age: 65
End: 2020-06-23
Payer: MEDICARE

## 2020-06-23 VITALS
SYSTOLIC BLOOD PRESSURE: 124 MMHG | WEIGHT: 273.38 LBS | HEIGHT: 64 IN | DIASTOLIC BLOOD PRESSURE: 84 MMHG | BODY MASS INDEX: 46.67 KG/M2

## 2020-06-23 DIAGNOSIS — Z01.419 ENCOUNTER FOR GYNECOLOGICAL EXAMINATION WITHOUT ABNORMAL FINDING: Primary | ICD-10-CM

## 2020-06-23 DIAGNOSIS — Z78.0 POSTMENOPAUSAL: ICD-10-CM

## 2020-06-23 PROCEDURE — 88175 CYTOPATH C/V AUTO FLUID REDO: CPT

## 2020-06-23 PROCEDURE — 99999 PR PBB SHADOW E&M-EST. PATIENT-LVL III: ICD-10-PCS | Mod: PBBFAC,,, | Performed by: NURSE PRACTITIONER

## 2020-06-23 PROCEDURE — 87624 HPV HI-RISK TYP POOLED RSLT: CPT

## 2020-06-23 PROCEDURE — G0101 PR CA SCREEN;PELVIC/BREAST EXAM: ICD-10-PCS | Mod: S$PBB,,, | Performed by: NURSE PRACTITIONER

## 2020-06-23 PROCEDURE — 99999 PR PBB SHADOW E&M-EST. PATIENT-LVL III: CPT | Mod: PBBFAC,,, | Performed by: NURSE PRACTITIONER

## 2020-06-23 PROCEDURE — G0101 CA SCREEN;PELVIC/BREAST EXAM: HCPCS | Mod: S$PBB,,, | Performed by: NURSE PRACTITIONER

## 2020-06-23 PROCEDURE — 99213 OFFICE O/P EST LOW 20 MIN: CPT | Mod: PBBFAC,25 | Performed by: NURSE PRACTITIONER

## 2020-06-23 PROCEDURE — G0101 CA SCREEN;PELVIC/BREAST EXAM: HCPCS | Mod: PBBFAC | Performed by: NURSE PRACTITIONER

## 2020-06-23 NOTE — PROGRESS NOTES
CC: Well woman exam    Kellie Beckett is a 65 y.o. female  presents for well woman exam.  LMP: No LMP recorded. Patient is postmenopausal..  No issues, problems, or complaints.    MMG in May 2020 -negative.     Past Medical History:   Diagnosis Date    Anemia     iron and b12 deficiency    Glaucoma     Hypertension      Past Surgical History:   Procedure Laterality Date    GASTRIC BYPASS  2004    GLAUCOMA SURGERY      right eye     Social History     Socioeconomic History    Marital status:      Spouse name: Not on file    Number of children: 0    Years of education: Not on file    Highest education level: Not on file   Occupational History    Not on file   Social Needs    Financial resource strain: Not hard at all    Food insecurity     Worry: Never true     Inability: Never true    Transportation needs     Medical: No     Non-medical: No   Tobacco Use    Smoking status: Never Smoker    Smokeless tobacco: Never Used   Substance and Sexual Activity    Alcohol use: No     Frequency: Never     Binge frequency: Never    Drug use: No    Sexual activity: Never   Lifestyle    Physical activity     Days per week: 0 days     Minutes per session: Not on file    Stress: Not at all   Relationships    Social connections     Talks on phone: More than three times a week     Gets together: More than three times a week     Attends [employer] service: More than 4 times per year     Active member of club or organization: Yes     Attends meetings of clubs or organizations: More than 4 times per year     Relationship status:    Other Topics Concern    Not on file   Social History Narrative    Live alone     Family History   Problem Relation Age of Onset    Cataracts Mother     Cancer Mother         bone    Cataracts Father     Glaucoma Father     Diabetes Father     Heart disease Father     Hypertension Father     Cancer Maternal Aunt         colon     OB History        1     "Para        Term                AB   1    Living           SAB   1    TAB        Ectopic        Multiple        Live Births                     /84   Ht 5' 4" (1.626 m)   Wt 124 kg (273 lb 5.9 oz)   BMI 46.92 kg/m²       ROS:  GENERAL: Denies weight gain or weight loss. Feeling well overall.   SKIN: Denies rash or lesions.   HEAD: Denies head injury or headache.   NODES: Denies enlarged lymph nodes.   CHEST: Denies chest pain or shortness of breath.   CARDIOVASCULAR: Denies palpitations or left sided chest pain.   ABDOMEN: No abdominal pain, constipation, diarrhea, nausea, vomiting or rectal bleeding.   URINARY: No frequency, dysuria, hematuria, or burning on urination.  REPRODUCTIVE: See HPI.   BREASTS: The patient performs breast self-examination and denies pain, lumps, or nipple discharge.   HEMATOLOGIC: No easy bruisability or excessive bleeding.   MUSCULOSKELETAL: Denies joint pain or swelling.   NEUROLOGIC: Denies syncope or weakness.   PSYCHIATRIC: Denies depression, anxiety or mood swings.    PHYSICAL EXAM:  APPEARANCE: Well nourished, well developed, in no acute distress.  AFFECT: WNL, alert and oriented x 3  SKIN: No acne or hirsutism  NECK: Neck symmetric without masses or thyromegaly  NODES: No inguinal, cervical, axillary, or femoral lymph node enlargement  CHEST: Good respiratory effect  ABDOMEN: Soft.  No tenderness or masses.  No hepatosplenomegaly.  No hernias.  BREASTS: Symmetrical, no skin changes or visible lesions.  No palpable masses, nipple discharge bilaterally.  PELVIC: Normal external genitalia without lesions.  Normal hair distribution.  Adequate perineal body, normal urethral meatus.  Vagina moist and well rugated without lesions or discharge.  Cervix pink, without lesions, discharge or tenderness.  No significant cystocele or rectocele.  Bimanual exam shows uterus to be normal size, regular, mobile and nontender.  Adnexa without masses or tenderness.    EXTREMITIES: No " edema.  Physical Exam    1. Encounter for gynecological examination without abnormal finding  Liquid-Based Pap Smear, Screening    HPV High Risk Genotypes, PCR   2. Postmenopausal      AND PLAN:    Patient was counseled today on A.C.S. Pap guidelines and recommendations for yearly pelvic exams, mammograms and monthly self breast exams; to see her PCP for other health maintenance.

## 2020-06-23 NOTE — PATIENT INSTRUCTIONS

## 2020-06-24 ENCOUNTER — PATIENT OUTREACH (OUTPATIENT)
Dept: OTHER | Facility: OTHER | Age: 65
End: 2020-06-24

## 2020-06-29 ENCOUNTER — PATIENT MESSAGE (OUTPATIENT)
Dept: OBSTETRICS AND GYNECOLOGY | Facility: CLINIC | Age: 65
End: 2020-06-29

## 2020-06-29 LAB
FINAL PATHOLOGIC DIAGNOSIS: NORMAL
Lab: NORMAL

## 2020-07-01 ENCOUNTER — LAB VISIT (OUTPATIENT)
Dept: LAB | Facility: HOSPITAL | Age: 65
End: 2020-07-01
Attending: INTERNAL MEDICINE
Payer: MEDICARE

## 2020-07-01 DIAGNOSIS — E55.9 VITAMIN D DEFICIENCY: ICD-10-CM

## 2020-07-01 DIAGNOSIS — I10 ESSENTIAL HYPERTENSION: ICD-10-CM

## 2020-07-01 LAB
25(OH)D3+25(OH)D2 SERPL-MCNC: 25 NG/ML (ref 30–96)
ANION GAP SERPL CALC-SCNC: 8 MMOL/L (ref 8–16)
BUN SERPL-MCNC: 24 MG/DL (ref 8–23)
CALCIUM SERPL-MCNC: 9.5 MG/DL (ref 8.7–10.5)
CHLORIDE SERPL-SCNC: 105 MMOL/L (ref 95–110)
CO2 SERPL-SCNC: 28 MMOL/L (ref 23–29)
CREAT SERPL-MCNC: 0.8 MG/DL (ref 0.5–1.4)
EST. GFR  (AFRICAN AMERICAN): >60 ML/MIN/1.73 M^2
EST. GFR  (NON AFRICAN AMERICAN): >60 ML/MIN/1.73 M^2
GLUCOSE SERPL-MCNC: 61 MG/DL (ref 70–110)
POTASSIUM SERPL-SCNC: 3.9 MMOL/L (ref 3.5–5.1)
SODIUM SERPL-SCNC: 141 MMOL/L (ref 136–145)

## 2020-07-01 PROCEDURE — 82306 VITAMIN D 25 HYDROXY: CPT

## 2020-07-01 PROCEDURE — 36415 COLL VENOUS BLD VENIPUNCTURE: CPT

## 2020-07-01 PROCEDURE — 80048 BASIC METABOLIC PNL TOTAL CA: CPT

## 2020-07-02 ENCOUNTER — TELEPHONE (OUTPATIENT)
Dept: INTERNAL MEDICINE | Facility: CLINIC | Age: 65
End: 2020-07-02

## 2020-07-07 ENCOUNTER — PATIENT MESSAGE (OUTPATIENT)
Dept: OBSTETRICS AND GYNECOLOGY | Facility: CLINIC | Age: 65
End: 2020-07-07

## 2020-07-07 LAB
HPV HR 12 DNA SPEC QL NAA+PROBE: NEGATIVE
HPV16 AG SPEC QL: NEGATIVE
HPV18 DNA SPEC QL NAA+PROBE: NEGATIVE

## 2020-07-08 NOTE — PROGRESS NOTES
Digital Medicine: Health  Follow-Up    Avg BP as of July 8, 2020 is 121/64.     Has been going to Drs appt for her and her aunt that were all cancelled during COVID. Had all her blood work done and everything came back good.     Got fit for a compression sock to help with foot pain but the Dr never sent the rx over, so she just ordered one off [employer]. Finally got the rx cream for the foot as well. Doing stretching as well for the pain if it comes back.     Spoke with Pao about her BP meds and the swelling she was getting.     Eating bananas about 3x/week now to help with K.    The history is provided by the patient.   Follow Up  Follow-up reason(s): reading review and routine education      Routine Education Topics: eating patterns and physical activity        INTERVENTION(S)  encouragement/support, denied resources and denied questions    PLAN  patient verbalizes understanding and continue monitoring      There are no preventive care reminders to display for this patient.    Last 5 Patient Entered Readings                                      Current 30 Day Average: 121/64     Recent Readings 7/1/2020 6/24/2020 6/17/2020 6/15/2020 6/6/2020    SBP (mmHg) 119 115 107 141 117    DBP (mmHg) 56 64 80 57 67    Pulse 71 70 70 70 75                      Diet Screening   No change to diet.  Patient reports eating or drinking the following: fruitShe cooks for self.    Patient does the shopping for groceries.  She gets groceries from the grocery store.      Assigning the following patient goals: maintain low sodium diet    Physical Activity Screening   No change to exercise routine.        She identified the following barriers to physical activity: no barriers to being active    Assigning the following patient goal(s): increase physical activity, 150 minutes of exercise per week and participate in exercise weekly    Medication Adherence Screening   She did not miss a dose this month.    Patient identified the  following reasons for non-compliance: None      SDOH

## 2020-08-12 ENCOUNTER — PATIENT OUTREACH (OUTPATIENT)
Dept: OTHER | Facility: OTHER | Age: 65
End: 2020-08-12

## 2020-09-16 NOTE — PROGRESS NOTES
Digital Medicine: Health  Follow-Up    The history is provided by the patient.             Reason for review: Blood pressure at goal        Topics Covered on Call: physical activity and Diet    Additional Follow-up details: Avg BP as of Sept 16, 2020 is 112/66. SBP trending down, DBP slight upward trend.    Still taking care of her aunt, got a scare yesterday because she had a higher BP reading and a fever of 101. Aunt is doing better today. Taking her to the Dr Thursday.     She states that she's doing well. Spending time looking after everyone.     She has been cooking more so not eating out as much and can control what she's eating. Weight is the same but she's been losing inches.     Knee has been hurting, so when she does feel better she will walk more than when it hurts. Got another knee injection 2 weeks ago. Doing some yard work as well. Next week when it's cooler she wants to get back outside.           Lifestyle  Plan  There are no preventive care reminders to display for this patient.    Last 5 Patient Entered Readings                                      Current 30 Day Average: 112/66     Recent Readings 9/15/2020 9/7/2020 8/27/2020 8/15/2020 8/7/2020    SBP (mmHg) 112 104 121 112 115    DBP (mmHg) 69 66 64 65 64    Pulse 63 68 72 76 79

## 2020-10-01 NOTE — PROGRESS NOTES
Chart review completed. Patient's BP is well controlled and she has an addition of HCTZ. Will call patient to complete medication reconciliation. Will follow up at future encounter.   Last 5 Patient Entered Readings                                      Current 30 Day Average: 114/69     Recent Readings 9/24/2020 9/15/2020 9/7/2020 8/27/2020 8/15/2020    SBP (mmHg) 127 112 104 121 112    DBP (mmHg) 72 69 66 64 65    Pulse 74 63 68 72 76

## 2020-10-15 ENCOUNTER — PATIENT OUTREACH (OUTPATIENT)
Dept: OTHER | Facility: OTHER | Age: 65
End: 2020-10-15

## 2020-10-15 NOTE — PROGRESS NOTES
Digital Medicine: Clinician Follow-Up    Called patient to follow up on HDMP. She confirmed that her PCP stopped amlodipine 5 mg and increased her HCTZ dose to 25 mg daily. She denies any unwanted complications from the medication.     The history is provided by the patient.   Follow-up reason(s): medication change follow-up and routine follow up.     Hypertension    Patient's blood pressure is stable.   Patient is not experiencing signs/symptoms of hypotension.  Patient is not experiencing signs/symptoms of hypertension.      Patient did make medication change.      Last 5 Patient Entered Readings                                      Current 30 Day Average: 123/72     Recent Readings 10/14/2020 10/3/2020 9/24/2020 9/15/2020 9/7/2020    SBP (mmHg) 127 125 127 112 104    DBP (mmHg) 72 74 72 69 66    Pulse 64 70 74 63 68          Depression Screening  Did not address depression screening.    Sleep Apnea Screening    Did not address sleep apnea screening.     Medication Affordability Screening  Did not address medication affordability screening.     Medication Adherence-Medication adherence was assessed.          ASSESSMENT(S)  Patients BP average is 123/72 mmHg, which is at goal. Patient's BP goal is less than or equal to 130/80.    HTN is appropriately managed with first line antihypertensive agent - thiazide diuretic.       Hypertension Plan  Additional monitoring needed.  Continue current therapy.  Continue current diet/physical activity routine.  Instructed to charge device.  Medication reconciliation was completed and medication list is up to date.     Addressed patient questions and patient has my contact information if needed prior to next outreach. Patient verbalizes understanding.      Explained the importance of self-monitoring and medication adherence. Encouraged the patient to communicate with their health  for lifestyle modifications to help improve or maintain a healthy lifestyle.                There are no preventive care reminders to display for this patient.  There are no preventive care reminders to display for this patient.      Hypertension Medications             cloNIDine (CATAPRES) 0.1 MG tablet Take 1 tablet (0.1 mg total) by mouth every 6 (six) hours as needed (SBP > 180 or DBP > 100).    hydroCHLOROthiazide (HYDRODIURIL) 25 MG tablet Take 1 tablet (25 mg total) by mouth once daily.

## 2020-10-28 ENCOUNTER — PATIENT OUTREACH (OUTPATIENT)
Dept: OTHER | Facility: OTHER | Age: 65
End: 2020-10-28

## 2020-11-11 NOTE — PROGRESS NOTES
Digital Medicine: Health  Follow-Up    The history is provided by the patient.             Reason for review: Blood pressure at goal        Topics Covered on Call: physical activity and Diet    Additional Follow-up details: Avg BP as of Nov 11, 2020 is 125/79.     No changes noted in diet, exercise, or stress.             Diet-no change to diet    No change to diet.        Physical Activity-no change to routine  No change to exercise routine.     Medication Adherence-Medication adherence was assessed.        Substance, Sleep, Stress-No change  stress-  Details:  Intervention(s):    Sleep-  Details:  Intervention(s):    Alcohol -  Details:  Intervention(s):    Tobacco-  Details:  Intervention(s):          Continue current diet/physical activity routine.       Addressed patient questions and patient has my contact information if needed prior to next outreach. Patient verbalizes understanding.      Explained the importance of self-monitoring and medication adherence. Encouraged the patient to communicate with their health  for lifestyle modifications to help improve or maintain a healthy lifestyle.               There are no preventive care reminders to display for this patient.      Last 5 Patient Entered Readings                                      Current 30 Day Average: 125/79     Recent Readings 11/10/2020 11/5/2020 10/24/2020 10/14/2020 10/3/2020    SBP (mmHg) 114 129 128 127 125    DBP (mmHg) 79 84 80 72 74    Pulse 85 62 68 64 70

## 2020-12-23 ENCOUNTER — PATIENT OUTREACH (OUTPATIENT)
Dept: OTHER | Facility: OTHER | Age: 65
End: 2020-12-23

## 2020-12-23 NOTE — PROGRESS NOTES
Digital Medicine: Health  Follow-Up    The history is provided by the patient.                   Additional Follow-up details: Had her 3rd knee injection yesterday and this one is supposed to last 6 months. Not working out currently.     No diet changes.     Patient would like to be discharged from the program. She feels her BP is controlled and doing well. Encouraged her to return to program if she felt she needed to down the road.             Diet-no change to diet    No change to diet.        Physical Activity-Change      Additional physical activity details: Had knee injection so not working out.       Medication Adherence-Medication Adherence not addressed.      Substance, Sleep, Stress-Not assessed      Continue current diet/physical activity routine.       Patient verbalizes understanding.      Explained the importance of self-monitoring and medication adherence. Encouraged the patient to communicate with their health  for lifestyle modifications to help improve or maintain a healthy lifestyle.               There are no preventive care reminders to display for this patient.      Last 5 Patient Entered Readings                                      Current 30 Day Average:      Recent Readings 11/21/2020 11/10/2020 11/5/2020 10/24/2020 10/14/2020    SBP (mmHg) 118 114 129 128 127    DBP (mmHg) 57 79 84 80 72    Pulse 76 85 62 68 64

## 2021-01-13 ENCOUNTER — OFFICE VISIT (OUTPATIENT)
Dept: INTERNAL MEDICINE | Facility: CLINIC | Age: 66
End: 2021-01-13
Payer: MEDICARE

## 2021-01-13 VITALS
SYSTOLIC BLOOD PRESSURE: 126 MMHG | OXYGEN SATURATION: 98 % | BODY MASS INDEX: 50.52 KG/M2 | DIASTOLIC BLOOD PRESSURE: 82 MMHG | TEMPERATURE: 97 F | HEART RATE: 97 BPM | WEIGHT: 293 LBS

## 2021-01-13 DIAGNOSIS — D64.9 ANEMIA, UNSPECIFIED TYPE: ICD-10-CM

## 2021-01-13 DIAGNOSIS — Z98.84 HISTORY OF GASTRIC BYPASS: ICD-10-CM

## 2021-01-13 DIAGNOSIS — Z86.2 HISTORY OF ANEMIA: ICD-10-CM

## 2021-01-13 DIAGNOSIS — I10 ESSENTIAL HYPERTENSION: Primary | ICD-10-CM

## 2021-01-13 DIAGNOSIS — E55.9 VITAMIN D DEFICIENCY: ICD-10-CM

## 2021-01-13 DIAGNOSIS — Z91.89 AT RISK FOR CORONARY ARTERY DISEASE: ICD-10-CM

## 2021-01-13 DIAGNOSIS — K91.2 POSTSURGICAL MALABSORPTION, NOT ELSEWHERE CLASSIFIED: ICD-10-CM

## 2021-01-13 DIAGNOSIS — E78.5 HYPERLIPIDEMIA, UNSPECIFIED HYPERLIPIDEMIA TYPE: ICD-10-CM

## 2021-01-13 PROCEDURE — 99214 OFFICE O/P EST MOD 30 MIN: CPT | Mod: S$PBB,,, | Performed by: INTERNAL MEDICINE

## 2021-01-13 PROCEDURE — 99999 PR PBB SHADOW E&M-EST. PATIENT-LVL IV: ICD-10-PCS | Mod: PBBFAC,,, | Performed by: INTERNAL MEDICINE

## 2021-01-13 PROCEDURE — 99214 OFFICE O/P EST MOD 30 MIN: CPT | Mod: PBBFAC | Performed by: INTERNAL MEDICINE

## 2021-01-13 PROCEDURE — 99214 PR OFFICE/OUTPT VISIT, EST, LEVL IV, 30-39 MIN: ICD-10-PCS | Mod: S$PBB,,, | Performed by: INTERNAL MEDICINE

## 2021-01-13 PROCEDURE — 99999 PR PBB SHADOW E&M-EST. PATIENT-LVL IV: CPT | Mod: PBBFAC,,, | Performed by: INTERNAL MEDICINE

## 2021-01-13 RX ORDER — HYDROCHLOROTHIAZIDE 25 MG/1
25 TABLET ORAL DAILY
Qty: 90 TABLET | Refills: 0 | Status: SHIPPED | OUTPATIENT
Start: 2021-01-13 | End: 2021-07-13 | Stop reason: SDUPTHER

## 2021-01-13 RX ORDER — BRINZOLAMIDE 10 MG/ML
SUSPENSION/ DROPS OPHTHALMIC
COMMUNITY
Start: 2020-12-29 | End: 2023-02-09

## 2021-01-13 RX ORDER — ROSUVASTATIN CALCIUM 10 MG/1
10 TABLET, COATED ORAL DAILY
Qty: 90 TABLET | Refills: 0 | Status: SHIPPED | OUTPATIENT
Start: 2021-01-13 | End: 2021-07-13 | Stop reason: SDUPTHER

## 2021-01-15 ENCOUNTER — LAB VISIT (OUTPATIENT)
Dept: LAB | Facility: HOSPITAL | Age: 66
End: 2021-01-15
Attending: INTERNAL MEDICINE
Payer: MEDICARE

## 2021-01-15 DIAGNOSIS — E55.9 VITAMIN D DEFICIENCY: ICD-10-CM

## 2021-01-15 DIAGNOSIS — Z98.84 HISTORY OF GASTRIC BYPASS: ICD-10-CM

## 2021-01-15 DIAGNOSIS — K91.2 POSTSURGICAL MALABSORPTION, NOT ELSEWHERE CLASSIFIED: ICD-10-CM

## 2021-01-15 DIAGNOSIS — I10 ESSENTIAL HYPERTENSION: ICD-10-CM

## 2021-01-15 DIAGNOSIS — D64.9 ANEMIA, UNSPECIFIED TYPE: ICD-10-CM

## 2021-01-15 LAB
BASOPHILS # BLD AUTO: 0.05 K/UL (ref 0–0.2)
BASOPHILS NFR BLD: 0.9 % (ref 0–1.9)
DIFFERENTIAL METHOD: ABNORMAL
EOSINOPHIL # BLD AUTO: 0.3 K/UL (ref 0–0.5)
EOSINOPHIL NFR BLD: 5 % (ref 0–8)
ERYTHROCYTE [DISTWIDTH] IN BLOOD BY AUTOMATED COUNT: 11.8 % (ref 11.5–14.5)
HCT VFR BLD AUTO: 40.2 % (ref 37–48.5)
HGB BLD-MCNC: 11.8 G/DL (ref 12–16)
IMM GRANULOCYTES # BLD AUTO: 0 K/UL (ref 0–0.04)
IMM GRANULOCYTES NFR BLD AUTO: 0 % (ref 0–0.5)
LYMPHOCYTES # BLD AUTO: 1.3 K/UL (ref 1–4.8)
LYMPHOCYTES NFR BLD: 23.1 % (ref 18–48)
MCH RBC QN AUTO: 28.3 PG (ref 27–31)
MCHC RBC AUTO-ENTMCNC: 29.4 G/DL (ref 32–36)
MCV RBC AUTO: 96 FL (ref 82–98)
MONOCYTES # BLD AUTO: 0.4 K/UL (ref 0.3–1)
MONOCYTES NFR BLD: 7.5 % (ref 4–15)
NEUTROPHILS # BLD AUTO: 3.5 K/UL (ref 1.8–7.7)
NEUTROPHILS NFR BLD: 63.5 % (ref 38–73)
NRBC BLD-RTO: 0 /100 WBC
PLATELET # BLD AUTO: 319 K/UL (ref 150–350)
PMV BLD AUTO: 11.1 FL (ref 9.2–12.9)
RBC # BLD AUTO: 4.17 M/UL (ref 4–5.4)
WBC # BLD AUTO: 5.45 K/UL (ref 3.9–12.7)

## 2021-01-15 PROCEDURE — 83540 ASSAY OF IRON: CPT

## 2021-01-15 PROCEDURE — 85025 COMPLETE CBC W/AUTO DIFF WBC: CPT

## 2021-01-15 PROCEDURE — 82746 ASSAY OF FOLIC ACID SERUM: CPT

## 2021-01-15 PROCEDURE — 82306 VITAMIN D 25 HYDROXY: CPT

## 2021-01-15 PROCEDURE — 82728 ASSAY OF FERRITIN: CPT

## 2021-01-15 PROCEDURE — 36415 COLL VENOUS BLD VENIPUNCTURE: CPT

## 2021-01-16 LAB
25(OH)D3+25(OH)D2 SERPL-MCNC: 28 NG/ML (ref 30–96)
FERRITIN SERPL-MCNC: 43 NG/ML (ref 20–300)
FOLATE SERPL-MCNC: 11.7 NG/ML (ref 4–24)
IRON SERPL-MCNC: 65 UG/DL (ref 30–160)
SATURATED IRON: 15 % (ref 20–50)
TOTAL IRON BINDING CAPACITY: 437 UG/DL (ref 250–450)
TRANSFERRIN SERPL-MCNC: 295 MG/DL (ref 200–375)

## 2021-01-20 ENCOUNTER — PATIENT MESSAGE (OUTPATIENT)
Dept: INTERNAL MEDICINE | Facility: CLINIC | Age: 66
End: 2021-01-20

## 2021-04-23 ENCOUNTER — PATIENT MESSAGE (OUTPATIENT)
Dept: OBSTETRICS AND GYNECOLOGY | Facility: CLINIC | Age: 66
End: 2021-04-23

## 2021-05-26 ENCOUNTER — PATIENT OUTREACH (OUTPATIENT)
Dept: ADMINISTRATIVE | Facility: OTHER | Age: 66
End: 2021-05-26

## 2021-05-26 DIAGNOSIS — Z12.31 ENCOUNTER FOR SCREENING MAMMOGRAM FOR MALIGNANT NEOPLASM OF BREAST: Primary | ICD-10-CM

## 2021-05-27 ENCOUNTER — OFFICE VISIT (OUTPATIENT)
Dept: OBSTETRICS AND GYNECOLOGY | Facility: CLINIC | Age: 66
End: 2021-05-27
Payer: MEDICARE

## 2021-05-27 ENCOUNTER — LAB VISIT (OUTPATIENT)
Dept: LAB | Facility: HOSPITAL | Age: 66
End: 2021-05-27
Attending: NURSE PRACTITIONER
Payer: MEDICARE

## 2021-05-27 VITALS
SYSTOLIC BLOOD PRESSURE: 120 MMHG | WEIGHT: 290.13 LBS | BODY MASS INDEX: 49.53 KG/M2 | DIASTOLIC BLOOD PRESSURE: 82 MMHG | HEIGHT: 64 IN

## 2021-05-27 DIAGNOSIS — E78.5 HYPERLIPIDEMIA, UNSPECIFIED: ICD-10-CM

## 2021-05-27 DIAGNOSIS — B35.9 TINEA: ICD-10-CM

## 2021-05-27 DIAGNOSIS — R23.2 HOT FLASHES: ICD-10-CM

## 2021-05-27 DIAGNOSIS — R32 URINARY INCONTINENCE, UNSPECIFIED TYPE: ICD-10-CM

## 2021-05-27 DIAGNOSIS — Z01.419 ENCOUNTER FOR GYNECOLOGICAL EXAMINATION WITHOUT ABNORMAL FINDING: Primary | ICD-10-CM

## 2021-05-27 PROCEDURE — 99999 PR PBB SHADOW E&M-EST. PATIENT-LVL IV: CPT | Mod: PBBFAC,,, | Performed by: NURSE PRACTITIONER

## 2021-05-27 PROCEDURE — 87624 HPV HI-RISK TYP POOLED RSLT: CPT | Performed by: NURSE PRACTITIONER

## 2021-05-27 PROCEDURE — 83001 ASSAY OF GONADOTROPIN (FSH): CPT | Performed by: NURSE PRACTITIONER

## 2021-05-27 PROCEDURE — 84443 ASSAY THYROID STIM HORMONE: CPT | Performed by: NURSE PRACTITIONER

## 2021-05-27 PROCEDURE — 99214 OFFICE O/P EST MOD 30 MIN: CPT | Mod: PBBFAC,25 | Performed by: NURSE PRACTITIONER

## 2021-05-27 PROCEDURE — 88175 CYTOPATH C/V AUTO FLUID REDO: CPT | Performed by: NURSE PRACTITIONER

## 2021-05-27 PROCEDURE — 36415 COLL VENOUS BLD VENIPUNCTURE: CPT | Performed by: NURSE PRACTITIONER

## 2021-05-27 PROCEDURE — 99999 PR PBB SHADOW E&M-EST. PATIENT-LVL IV: ICD-10-PCS | Mod: PBBFAC,,, | Performed by: NURSE PRACTITIONER

## 2021-05-27 RX ORDER — NYSTATIN 100000 [USP'U]/G
POWDER TOPICAL
Qty: 60 G | Refills: 2 | Status: SHIPPED | OUTPATIENT
Start: 2021-05-27 | End: 2021-12-23

## 2021-05-28 ENCOUNTER — PATIENT MESSAGE (OUTPATIENT)
Dept: OBSTETRICS AND GYNECOLOGY | Facility: CLINIC | Age: 66
End: 2021-05-28

## 2021-05-28 ENCOUNTER — HOSPITAL ENCOUNTER (OUTPATIENT)
Dept: RADIOLOGY | Facility: HOSPITAL | Age: 66
Discharge: HOME | End: 2021-05-28
Attending: INTERNAL MEDICINE
Payer: MEDICARE

## 2021-05-28 VITALS — HEIGHT: 64 IN | WEIGHT: 290.13 LBS | BODY MASS INDEX: 49.53 KG/M2

## 2021-05-28 DIAGNOSIS — Z12.31 ENCOUNTER FOR SCREENING MAMMOGRAM FOR MALIGNANT NEOPLASM OF BREAST: ICD-10-CM

## 2021-05-28 LAB
FSH SERPL-ACNC: 44.6 MIU/ML
TSH SERPL DL<=0.005 MIU/L-ACNC: 1.13 UIU/ML (ref 0.4–4)

## 2021-05-28 PROCEDURE — 77067 SCR MAMMO BI INCL CAD: CPT | Mod: 26,,, | Performed by: RADIOLOGY

## 2021-05-28 PROCEDURE — 77067 MAMMO DIGITAL SCREENING BILAT WITH TOMO: ICD-10-PCS | Mod: 26,,, | Performed by: RADIOLOGY

## 2021-05-28 PROCEDURE — 77067 SCR MAMMO BI INCL CAD: CPT | Mod: TC

## 2021-05-28 PROCEDURE — 77063 MAMMO DIGITAL SCREENING BILAT WITH TOMO: ICD-10-PCS | Mod: 26,,, | Performed by: RADIOLOGY

## 2021-05-28 PROCEDURE — 77063 BREAST TOMOSYNTHESIS BI: CPT | Mod: 26,,, | Performed by: RADIOLOGY

## 2021-06-01 LAB
FINAL PATHOLOGIC DIAGNOSIS: NORMAL
Lab: NORMAL

## 2021-06-04 ENCOUNTER — PATIENT MESSAGE (OUTPATIENT)
Dept: OBSTETRICS AND GYNECOLOGY | Facility: CLINIC | Age: 66
End: 2021-06-04

## 2021-07-13 ENCOUNTER — OFFICE VISIT (OUTPATIENT)
Dept: INTERNAL MEDICINE | Facility: CLINIC | Age: 66
End: 2021-07-13
Payer: MEDICARE

## 2021-07-13 VITALS
OXYGEN SATURATION: 97 % | HEIGHT: 64 IN | TEMPERATURE: 98 F | SYSTOLIC BLOOD PRESSURE: 132 MMHG | WEIGHT: 293 LBS | DIASTOLIC BLOOD PRESSURE: 80 MMHG | HEART RATE: 103 BPM | RESPIRATION RATE: 18 BRPM | BODY MASS INDEX: 50.02 KG/M2

## 2021-07-13 DIAGNOSIS — Z00.00 ROUTINE GENERAL MEDICAL EXAMINATION AT A HEALTH CARE FACILITY: Primary | ICD-10-CM

## 2021-07-13 DIAGNOSIS — Z91.89 AT RISK FOR CORONARY ARTERY DISEASE: ICD-10-CM

## 2021-07-13 DIAGNOSIS — E78.5 HYPERLIPIDEMIA, UNSPECIFIED HYPERLIPIDEMIA TYPE: ICD-10-CM

## 2021-07-13 DIAGNOSIS — I10 ESSENTIAL HYPERTENSION: ICD-10-CM

## 2021-07-13 DIAGNOSIS — E66.01 MORBID OBESITY WITH BMI OF 40.0-44.9, ADULT: ICD-10-CM

## 2021-07-13 DIAGNOSIS — E55.9 VITAMIN D DEFICIENCY: ICD-10-CM

## 2021-07-13 DIAGNOSIS — Z23 NEED FOR STREPTOCOCCUS PNEUMONIAE VACCINATION: ICD-10-CM

## 2021-07-13 PROCEDURE — G0009 ADMIN PNEUMOCOCCAL VACCINE: HCPCS | Mod: PBBFAC

## 2021-07-13 PROCEDURE — 90732 PPSV23 VACC 2 YRS+ SUBQ/IM: CPT | Mod: PBBFAC

## 2021-07-13 PROCEDURE — 99999 PR PBB SHADOW E&M-EST. PATIENT-LVL IV: CPT | Mod: PBBFAC,,, | Performed by: INTERNAL MEDICINE

## 2021-07-13 PROCEDURE — 99999 PR PBB SHADOW E&M-EST. PATIENT-LVL IV: ICD-10-PCS | Mod: PBBFAC,,, | Performed by: INTERNAL MEDICINE

## 2021-07-13 PROCEDURE — 99214 OFFICE O/P EST MOD 30 MIN: CPT | Mod: PBBFAC | Performed by: INTERNAL MEDICINE

## 2021-07-13 PROCEDURE — 99397 PR PREVENTIVE VISIT,EST,65 & OVER: ICD-10-PCS | Mod: S$PBB,,, | Performed by: INTERNAL MEDICINE

## 2021-07-13 PROCEDURE — 99397 PER PM REEVAL EST PAT 65+ YR: CPT | Mod: S$PBB,,, | Performed by: INTERNAL MEDICINE

## 2021-07-13 RX ORDER — HYDROCHLOROTHIAZIDE 25 MG/1
25 TABLET ORAL DAILY
Qty: 90 TABLET | Refills: 0 | Status: SHIPPED | OUTPATIENT
Start: 2021-07-13 | End: 2021-09-21

## 2021-07-13 RX ORDER — ROSUVASTATIN CALCIUM 10 MG/1
10 TABLET, COATED ORAL DAILY
Qty: 90 TABLET | Refills: 0 | Status: SHIPPED | OUTPATIENT
Start: 2021-07-13 | End: 2022-01-28 | Stop reason: SDUPTHER

## 2021-07-15 ENCOUNTER — LAB VISIT (OUTPATIENT)
Dept: LAB | Facility: HOSPITAL | Age: 66
End: 2021-07-15
Attending: INTERNAL MEDICINE
Payer: MEDICARE

## 2021-07-15 DIAGNOSIS — I10 ESSENTIAL HYPERTENSION: ICD-10-CM

## 2021-07-15 DIAGNOSIS — E55.9 VITAMIN D DEFICIENCY: ICD-10-CM

## 2021-07-15 DIAGNOSIS — Z00.00 ROUTINE GENERAL MEDICAL EXAMINATION AT A HEALTH CARE FACILITY: ICD-10-CM

## 2021-07-15 DIAGNOSIS — E78.5 HYPERLIPIDEMIA, UNSPECIFIED HYPERLIPIDEMIA TYPE: ICD-10-CM

## 2021-07-15 LAB
ALBUMIN SERPL BCP-MCNC: 3.5 G/DL (ref 3.5–5.2)
ALP SERPL-CCNC: 104 U/L (ref 55–135)
ALT SERPL W/O P-5'-P-CCNC: 25 U/L (ref 10–44)
ANION GAP SERPL CALC-SCNC: 9 MMOL/L (ref 8–16)
AST SERPL-CCNC: 25 U/L (ref 10–40)
BASOPHILS # BLD AUTO: 0.06 K/UL (ref 0–0.2)
BASOPHILS NFR BLD: 0.9 % (ref 0–1.9)
BILIRUB SERPL-MCNC: 0.7 MG/DL (ref 0.1–1)
BUN SERPL-MCNC: 22 MG/DL (ref 8–23)
CALCIUM SERPL-MCNC: 9.7 MG/DL (ref 8.7–10.5)
CHLORIDE SERPL-SCNC: 103 MMOL/L (ref 95–110)
CHOLEST SERPL-MCNC: 139 MG/DL (ref 120–199)
CHOLEST/HDLC SERPL: 1.6 {RATIO} (ref 2–5)
CO2 SERPL-SCNC: 29 MMOL/L (ref 23–29)
CREAT SERPL-MCNC: 0.8 MG/DL (ref 0.5–1.4)
DIFFERENTIAL METHOD: ABNORMAL
EOSINOPHIL # BLD AUTO: 0.2 K/UL (ref 0–0.5)
EOSINOPHIL NFR BLD: 3.1 % (ref 0–8)
ERYTHROCYTE [DISTWIDTH] IN BLOOD BY AUTOMATED COUNT: 12.3 % (ref 11.5–14.5)
EST. GFR  (AFRICAN AMERICAN): >60 ML/MIN/1.73 M^2
EST. GFR  (NON AFRICAN AMERICAN): >60 ML/MIN/1.73 M^2
GLUCOSE SERPL-MCNC: 87 MG/DL (ref 70–110)
HCT VFR BLD AUTO: 39.1 % (ref 37–48.5)
HDLC SERPL-MCNC: 85 MG/DL (ref 40–75)
HDLC SERPL: 61.2 % (ref 20–50)
HGB BLD-MCNC: 12 G/DL (ref 12–16)
IMM GRANULOCYTES # BLD AUTO: 0.02 K/UL (ref 0–0.04)
IMM GRANULOCYTES NFR BLD AUTO: 0.3 % (ref 0–0.5)
LDLC SERPL CALC-MCNC: 46.8 MG/DL (ref 63–159)
LYMPHOCYTES # BLD AUTO: 1.2 K/UL (ref 1–4.8)
LYMPHOCYTES NFR BLD: 18.3 % (ref 18–48)
MCH RBC QN AUTO: 28.9 PG (ref 27–31)
MCHC RBC AUTO-ENTMCNC: 30.7 G/DL (ref 32–36)
MCV RBC AUTO: 94 FL (ref 82–98)
MONOCYTES # BLD AUTO: 0.5 K/UL (ref 0.3–1)
MONOCYTES NFR BLD: 8.2 % (ref 4–15)
NEUTROPHILS # BLD AUTO: 4.5 K/UL (ref 1.8–7.7)
NEUTROPHILS NFR BLD: 69.2 % (ref 38–73)
NONHDLC SERPL-MCNC: 54 MG/DL
NRBC BLD-RTO: 0 /100 WBC
PLATELET # BLD AUTO: 371 K/UL (ref 150–450)
PMV BLD AUTO: 10.8 FL (ref 9.2–12.9)
POTASSIUM SERPL-SCNC: 4.2 MMOL/L (ref 3.5–5.1)
PROT SERPL-MCNC: 7.2 G/DL (ref 6–8.4)
RBC # BLD AUTO: 4.15 M/UL (ref 4–5.4)
SODIUM SERPL-SCNC: 141 MMOL/L (ref 136–145)
TRIGL SERPL-MCNC: 36 MG/DL (ref 30–150)
WBC # BLD AUTO: 6.5 K/UL (ref 3.9–12.7)

## 2021-07-15 PROCEDURE — 36415 COLL VENOUS BLD VENIPUNCTURE: CPT | Performed by: INTERNAL MEDICINE

## 2021-07-15 PROCEDURE — 85025 COMPLETE CBC W/AUTO DIFF WBC: CPT | Performed by: INTERNAL MEDICINE

## 2021-07-15 PROCEDURE — 80053 COMPREHEN METABOLIC PANEL: CPT | Performed by: INTERNAL MEDICINE

## 2021-07-15 PROCEDURE — 80061 LIPID PANEL: CPT | Performed by: INTERNAL MEDICINE

## 2021-07-15 PROCEDURE — 82306 VITAMIN D 25 HYDROXY: CPT | Performed by: INTERNAL MEDICINE

## 2021-07-16 LAB — 25(OH)D3+25(OH)D2 SERPL-MCNC: 23 NG/ML (ref 30–96)

## 2022-01-06 ENCOUNTER — PATIENT MESSAGE (OUTPATIENT)
Dept: INTERNAL MEDICINE | Facility: CLINIC | Age: 67
End: 2022-01-06
Payer: COMMERCIAL

## 2022-01-14 ENCOUNTER — PATIENT MESSAGE (OUTPATIENT)
Dept: INTERNAL MEDICINE | Facility: CLINIC | Age: 67
End: 2022-01-14
Payer: COMMERCIAL

## 2022-01-28 ENCOUNTER — PATIENT MESSAGE (OUTPATIENT)
Dept: ADMINISTRATIVE | Facility: OTHER | Age: 67
End: 2022-01-28
Payer: COMMERCIAL

## 2022-01-28 ENCOUNTER — OFFICE VISIT (OUTPATIENT)
Dept: INTERNAL MEDICINE | Facility: CLINIC | Age: 67
End: 2022-01-28
Payer: MEDICARE

## 2022-01-28 VITALS
WEIGHT: 293 LBS | HEART RATE: 103 BPM | TEMPERATURE: 98 F | HEIGHT: 64 IN | OXYGEN SATURATION: 95 % | DIASTOLIC BLOOD PRESSURE: 92 MMHG | SYSTOLIC BLOOD PRESSURE: 182 MMHG | BODY MASS INDEX: 50.02 KG/M2

## 2022-01-28 DIAGNOSIS — E66.01 MORBID OBESITY WITH BMI OF 40.0-44.9, ADULT: ICD-10-CM

## 2022-01-28 DIAGNOSIS — I10 ESSENTIAL HYPERTENSION: Primary | ICD-10-CM

## 2022-01-28 DIAGNOSIS — E78.5 HYPERLIPIDEMIA, UNSPECIFIED HYPERLIPIDEMIA TYPE: ICD-10-CM

## 2022-01-28 DIAGNOSIS — E55.9 VITAMIN D DEFICIENCY: ICD-10-CM

## 2022-01-28 DIAGNOSIS — Z91.89 AT RISK FOR CORONARY ARTERY DISEASE: ICD-10-CM

## 2022-01-28 PROCEDURE — 99214 OFFICE O/P EST MOD 30 MIN: CPT | Mod: S$PBB,,, | Performed by: INTERNAL MEDICINE

## 2022-01-28 PROCEDURE — 99215 OFFICE O/P EST HI 40 MIN: CPT | Mod: PBBFAC | Performed by: INTERNAL MEDICINE

## 2022-01-28 PROCEDURE — 99214 PR OFFICE/OUTPT VISIT, EST, LEVL IV, 30-39 MIN: ICD-10-PCS | Mod: S$PBB,,, | Performed by: INTERNAL MEDICINE

## 2022-01-28 PROCEDURE — 99999 PR PBB SHADOW E&M-EST. PATIENT-LVL V: ICD-10-PCS | Mod: PBBFAC,,, | Performed by: INTERNAL MEDICINE

## 2022-01-28 PROCEDURE — 99999 PR PBB SHADOW E&M-EST. PATIENT-LVL V: CPT | Mod: PBBFAC,,, | Performed by: INTERNAL MEDICINE

## 2022-01-28 RX ORDER — HYDROCHLOROTHIAZIDE 25 MG/1
25 TABLET ORAL DAILY
Qty: 90 TABLET | Refills: 1 | Status: SHIPPED | OUTPATIENT
Start: 2022-01-28 | End: 2022-04-04 | Stop reason: SDUPTHER

## 2022-01-28 RX ORDER — ROSUVASTATIN CALCIUM 10 MG/1
10 TABLET, COATED ORAL DAILY
Qty: 90 TABLET | Refills: 1 | Status: SHIPPED | OUTPATIENT
Start: 2022-01-28 | End: 2022-07-21

## 2022-01-28 RX ORDER — CELECOXIB 200 MG/1
200 CAPSULE ORAL DAILY PRN
COMMUNITY
Start: 2021-12-14 | End: 2023-02-09

## 2022-01-28 RX ORDER — AMLODIPINE BESYLATE 5 MG/1
5 TABLET ORAL DAILY
Qty: 90 TABLET | Refills: 0 | Status: CANCELLED | OUTPATIENT
Start: 2022-01-28

## 2022-01-28 RX ORDER — LOSARTAN POTASSIUM 50 MG/1
50 TABLET ORAL DAILY
Qty: 90 TABLET | Refills: 0 | Status: SHIPPED | OUTPATIENT
Start: 2022-01-28 | End: 2022-01-31 | Stop reason: SINTOL

## 2022-01-28 NOTE — PROGRESS NOTES
"Subjective:      Patient ID: Kellie Beckett is a 66 y.o. female.    Chief Complaint: Follow-up    HPI     65 yo with   Patient Active Problem List   Diagnosis    Anemia    Essential hypertension    COAG (chronic open-angle glaucoma)    Morbid obesity with BMI of 40.0-44.9, adult    History of gastric bypass    At risk for coronary artery disease    Bilateral sensorineural hearing loss    Vitamin D deficiency    History of anemia    Chronic right-sided low back pain with right-sided sciatica    Primary osteoarthritis of both knees    Right hip pain    Benign paroxysmal positional vertigo    Chronic diarrhea    Chronic pain     Past Medical History:   Diagnosis Date    Anemia     iron and b12 deficiency    Glaucoma     Hypertension      Here today for management of mult med problems as outlined below.  Compliant with meds without significant side effects. Energy and appetite good.       Review of Systems   Constitutional: Negative for chills and fever.   HENT: Negative for ear pain and sore throat.    Respiratory: Negative for cough, shortness of breath and wheezing.    Cardiovascular: Negative for chest pain and palpitations.   Gastrointestinal: Negative for abdominal pain and blood in stool.   Genitourinary: Negative for dysuria and hematuria.   Skin: Negative for rash and wound.   Neurological: Negative for seizures and syncope.     Objective:   BP (!) 182/92 (BP Location: Left arm, Patient Position: Sitting, BP Method: Thigh Cuff (Manual))   Pulse 103   Temp 97.6 °F (36.4 °C) (Tympanic)   Ht 5' 4" (1.626 m)   Wt (!) 136.1 kg (300 lb 0.7 oz)   SpO2 95%   BMI 51.50 kg/m²     Physical Exam  Constitutional:       General: She is not in acute distress.     Appearance: She is well-developed and well-nourished.   HENT:      Head: Normocephalic and atraumatic.      Mouth/Throat:      Mouth: Oropharynx is clear and moist.   Eyes:      Extraocular Movements: EOM normal.      Pupils: Pupils are " equal, round, and reactive to light.   Neck:      Thyroid: No thyromegaly.   Cardiovascular:      Rate and Rhythm: Normal rate and regular rhythm.   Pulmonary:      Breath sounds: Normal breath sounds. No wheezing or rales.   Abdominal:      General: Bowel sounds are normal.      Palpations: Abdomen is soft.      Tenderness: There is no abdominal tenderness.   Musculoskeletal:      Cervical back: Neck supple.   Lymphadenopathy:      Cervical: No cervical adenopathy.   Skin:     General: Skin is warm and dry.   Neurological:      Mental Status: She is alert and oriented to person, place, and time.   Psychiatric:         Mood and Affect: Mood and affect normal.         Behavior: Behavior normal.         Assessment:     1. Essential hypertension    2. At risk for coronary artery disease    3. Hyperlipidemia, unspecified hyperlipidemia type    4. Vitamin D deficiency    5. Morbid obesity with BMI of 40.0-44.9, adult      Plan:   Essential hypertension  -     Hypertension Digital Medicine (HDMP) Enrollment Order  -     Hypertension Digital Medicine ([facility]): Assign Onboarding Questionnaires  -     hydroCHLOROthiazide (HYDRODIURIL) 25 MG tablet; Take 1 tablet (25 mg total) by mouth once daily.  Dispense: 90 tablet; Refill: 1    At risk for coronary artery disease    Hyperlipidemia, unspecified hyperlipidemia type  -     Comprehensive Metabolic Panel; Future; Expected date: 07/27/2022  -     CBC Auto Differential; Future; Expected date: 07/27/2022  -     TSH; Future; Expected date: 07/27/2022  -     Lipid Panel; Future; Expected date: 07/27/2022  -     rosuvastatin (CRESTOR) 10 MG tablet; Take 1 tablet (10 mg total) by mouth once daily.  Dispense: 90 tablet; Refill: 1    Vitamin D deficiency  -     CALCITRIOL; Future; Expected date: 07/28/2022    Morbid obesity with BMI of 40.0-44.9, adult    Other orders  -     losartan (COZAAR) 50 MG tablet; Take 1 tablet (50 mg total) by mouth once daily.  Dispense: 90 tablet; Refill:  0            Problem List Items Addressed This Visit     Essential hypertension - Primary    Current Assessment & Plan     Not at goal         Relevant Medications    hydroCHLOROthiazide (HYDRODIURIL) 25 MG tablet    Other Relevant Orders    Hypertension Digital Medicine (HDMP) Enrollment Order (Completed)    [facility] ([facility]): Assign Onboarding Questionnaires (Completed)    Morbid obesity with BMI of 40.0-44.9, adult    Current Assessment & Plan     Diet and exercise         At risk for coronary artery disease    Overview     ascvd 6.8 to 7.8, 6/2019   total calcium score is 54 6/2019         Current Assessment & Plan     Controlled on statin         Vitamin D deficiency    Relevant Orders    CALCITRIOL      Other Visit Diagnoses     Hyperlipidemia, unspecified hyperlipidemia type        Relevant Medications    rosuvastatin (CRESTOR) 10 MG tablet    Other Relevant Orders    Comprehensive Metabolic Panel    CBC Auto Differential    TSH    Lipid Panel          Follow up in about 4 weeks (around 2/25/2022), or if symptoms worsen or fail to improve.

## 2022-01-30 ENCOUNTER — PATIENT MESSAGE (OUTPATIENT)
Dept: INTERNAL MEDICINE | Facility: CLINIC | Age: 67
End: 2022-01-30
Payer: COMMERCIAL

## 2022-01-31 ENCOUNTER — PATIENT MESSAGE (OUTPATIENT)
Dept: INTERNAL MEDICINE | Facility: CLINIC | Age: 67
End: 2022-01-31
Payer: COMMERCIAL

## 2022-01-31 DIAGNOSIS — I10 ESSENTIAL HYPERTENSION: Primary | ICD-10-CM

## 2022-01-31 RX ORDER — LISINOPRIL 2.5 MG/1
2.5 TABLET ORAL DAILY
Qty: 90 TABLET | Refills: 0 | Status: SHIPPED | OUTPATIENT
Start: 2022-01-31 | End: 2022-04-04

## 2022-04-04 ENCOUNTER — OFFICE VISIT (OUTPATIENT)
Dept: INTERNAL MEDICINE | Facility: CLINIC | Age: 67
End: 2022-04-04
Payer: MEDICARE

## 2022-04-04 ENCOUNTER — TELEPHONE (OUTPATIENT)
Dept: INTERNAL MEDICINE | Facility: CLINIC | Age: 67
End: 2022-04-04

## 2022-04-04 VITALS
HEART RATE: 83 BPM | SYSTOLIC BLOOD PRESSURE: 136 MMHG | WEIGHT: 293 LBS | OXYGEN SATURATION: 99 % | BODY MASS INDEX: 50.02 KG/M2 | HEIGHT: 64 IN | TEMPERATURE: 99 F | RESPIRATION RATE: 18 BRPM | DIASTOLIC BLOOD PRESSURE: 84 MMHG

## 2022-04-04 DIAGNOSIS — I10 ESSENTIAL HYPERTENSION: Primary | ICD-10-CM

## 2022-04-04 DIAGNOSIS — Z00.00 PREVENTATIVE HEALTH CARE: ICD-10-CM

## 2022-04-04 DIAGNOSIS — E66.01 SEVERE OBESITY (BMI >= 40): ICD-10-CM

## 2022-04-04 PROCEDURE — 99999 PR PBB SHADOW E&M-EST. PATIENT-LVL IV: ICD-10-PCS | Mod: PBBFAC,,, | Performed by: INTERNAL MEDICINE

## 2022-04-04 PROCEDURE — 99214 PR OFFICE/OUTPT VISIT, EST, LEVL IV, 30-39 MIN: ICD-10-PCS | Mod: S$PBB,,, | Performed by: INTERNAL MEDICINE

## 2022-04-04 PROCEDURE — 99214 OFFICE O/P EST MOD 30 MIN: CPT | Mod: S$PBB,,, | Performed by: INTERNAL MEDICINE

## 2022-04-04 PROCEDURE — 99214 OFFICE O/P EST MOD 30 MIN: CPT | Mod: PBBFAC | Performed by: INTERNAL MEDICINE

## 2022-04-04 PROCEDURE — 99999 PR PBB SHADOW E&M-EST. PATIENT-LVL IV: CPT | Mod: PBBFAC,,, | Performed by: INTERNAL MEDICINE

## 2022-04-04 RX ORDER — HYDROCHLOROTHIAZIDE 25 MG/1
25 TABLET ORAL DAILY
Qty: 90 TABLET | Refills: 0 | Status: SHIPPED | OUTPATIENT
Start: 2022-04-04 | End: 2022-08-11 | Stop reason: SDUPTHER

## 2022-04-04 RX ORDER — LISINOPRIL 2.5 MG/1
2.5 TABLET ORAL DAILY
Qty: 90 TABLET | Refills: 1 | Status: CANCELLED | OUTPATIENT
Start: 2022-04-04 | End: 2023-04-04

## 2022-04-04 NOTE — TELEPHONE ENCOUNTER
Tried reaching pt by phone to schedule an appointment w/ [facility]. LVM for pt to contact the clinic. //mf

## 2022-04-04 NOTE — PROGRESS NOTES
Subjective:      Patient ID: Kellie Beckett is a 67 y.o. female.    Chief Complaint: No chief complaint on file.    HPI     66 yo with   Patient Active Problem List   Diagnosis    Anemia    Essential hypertension    COAG (chronic open-angle glaucoma)    Morbid obesity with BMI of 40.0-44.9, adult    History of gastric bypass    At risk for coronary artery disease    Bilateral sensorineural hearing loss    Vitamin D deficiency    History of anemia    Chronic right-sided low back pain with right-sided sciatica    Primary osteoarthritis of both knees    Right hip pain    Benign paroxysmal positional vertigo    Chronic diarrhea    Chronic pain     Past Medical History:   Diagnosis Date    Anemia     iron and b12 deficiency    Glaucoma     Hypertension      Here today for management of mult med problems as outlined below.  Compliant with her medications.  However she noticed when taking lisinopril daily her blood pressure would drop too low.  As low as 99/65 which made her feel fatigued.  She has been taking lisinopril every other day.  She is participating in the digital hypertension program.    Review of Systems   Constitutional: Negative for activity change and unexpected weight change.   HENT: Negative for hearing loss, rhinorrhea and trouble swallowing.    Eyes: Negative for discharge and visual disturbance.   Respiratory: Negative for chest tightness and wheezing.    Cardiovascular: Negative for chest pain and palpitations.   Gastrointestinal: Negative for blood in stool, constipation, diarrhea and vomiting.   Endocrine: Negative for polydipsia and polyuria.   Genitourinary: Negative for difficulty urinating, dysuria, hematuria and menstrual problem.   Musculoskeletal: Negative for arthralgias, joint swelling and neck pain.   Neurological: Negative for weakness and headaches.   Psychiatric/Behavioral: Negative for confusion and dysphoric mood.     Objective:   /84 (BP Location: Left arm,  "Patient Position: Sitting, BP Method: Medium (Manual))   Pulse 83   Temp 98.5 °F (36.9 °C) (Tympanic)   Resp 18   Ht 5' 4" (1.626 m)   Wt 133.3 kg (293 lb 14 oz)   SpO2 99%   BMI 50.44 kg/m²     Physical Exam  Constitutional:       General: She is awake.      Appearance: Normal appearance.   HENT:      Head: Normocephalic and atraumatic.   Eyes:      Conjunctiva/sclera: Conjunctivae normal.   Pulmonary:      Effort: Pulmonary effort is normal.   Musculoskeletal:      Cervical back: No rigidity.   Neurological:      Mental Status: She is alert. Mental status is at baseline.   Psychiatric:         Mood and Affect: Mood normal.         Behavior: Behavior normal. Behavior is cooperative.         Thought Content: Thought content normal.         Judgment: Judgment normal.         Assessment:     1. Essential hypertension    2. Preventative health care    3. Severe obesity (BMI >= 40)      Plan:   Essential hypertension  Controlled.  Advised patient to stop lisinopril due to low blood pressures.  Advised to continue with digital hypertension program and medications will be adjusted accordingly.  -     hydroCHLOROthiazide (HYDRODIURIL) 25 MG tablet; Take 1 tablet (25 mg total) by mouth once daily.  Dispense: 90 tablet; Refill: 0    Preventative health care    Severe obesity (BMI >= 40)  Patient has struggled with diet and exercise and weight gain for many years.  She reports that she has recently started another diet..  -     Ambulatory referral/consult to [facility]; Future; Expected date: 04/11/2022        Lab Frequency Next Occurrence   Comprehensive Metabolic Panel Once 07/27/2022   CBC Auto Differential Once 07/27/2022   TSH Once 07/27/2022   Lipid Panel Once 07/27/2022   CALCITRIOL Once 07/28/2022       Problem List Items Addressed This Visit     Essential hypertension - Primary    Relevant Medications    hydroCHLOROthiazide (HYDRODIURIL) 25 MG tablet      Other Visit Diagnoses     Preventative " health care        Severe obesity (BMI >= 40)        Relevant Orders    Ambulatory referral/consult to [facility]          Follow up in about 4 months (around 8/2/2022), or if symptoms worsen or fail to improve.  Labs were ordered in January.  These labs need to be completed at the end of July.

## 2022-04-26 ENCOUNTER — PATIENT MESSAGE (OUTPATIENT)
Dept: PRIMARY CARE CLINIC | Facility: CLINIC | Age: 67
End: 2022-04-26
Payer: COMMERCIAL

## 2022-04-26 ENCOUNTER — TELEPHONE (OUTPATIENT)
Dept: PRIMARY CARE CLINIC | Facility: CLINIC | Age: 67
End: 2022-04-26
Payer: COMMERCIAL

## 2022-05-16 ENCOUNTER — PATIENT MESSAGE (OUTPATIENT)
Dept: PRIMARY CARE CLINIC | Facility: CLINIC | Age: 67
End: 2022-05-16
Payer: COMMERCIAL

## 2022-05-17 ENCOUNTER — PATIENT MESSAGE (OUTPATIENT)
Dept: PRIMARY CARE CLINIC | Facility: CLINIC | Age: 67
End: 2022-05-17
Payer: COMMERCIAL

## 2022-05-23 ENCOUNTER — PATIENT MESSAGE (OUTPATIENT)
Dept: ADMINISTRATIVE | Facility: OTHER | Age: 67
End: 2022-05-23
Payer: COMMERCIAL

## 2022-05-26 ENCOUNTER — OFFICE VISIT (OUTPATIENT)
Dept: PRIMARY CARE CLINIC | Facility: CLINIC | Age: 67
End: 2022-05-26
Payer: MEDICARE

## 2022-05-26 VITALS
DIASTOLIC BLOOD PRESSURE: 76 MMHG | WEIGHT: 286.19 LBS | HEIGHT: 64 IN | SYSTOLIC BLOOD PRESSURE: 140 MMHG | BODY MASS INDEX: 48.86 KG/M2

## 2022-05-26 DIAGNOSIS — Z91.89 AT RISK FOR CORONARY ARTERY DISEASE: ICD-10-CM

## 2022-05-26 DIAGNOSIS — E16.2 HYPOGLYCEMIA: Primary | ICD-10-CM

## 2022-05-26 DIAGNOSIS — Z86.2 HISTORY OF ANEMIA: ICD-10-CM

## 2022-05-26 DIAGNOSIS — E55.9 VITAMIN D DEFICIENCY: ICD-10-CM

## 2022-05-26 DIAGNOSIS — E66.01 MORBID OBESITY WITH BMI OF 40.0-44.9, ADULT: ICD-10-CM

## 2022-05-26 DIAGNOSIS — I10 ESSENTIAL HYPERTENSION: ICD-10-CM

## 2022-05-26 PROCEDURE — 99999 PR PBB SHADOW E&M-EST. PATIENT-LVL III: CPT | Mod: PBBFAC,,, | Performed by: PHYSICIAN ASSISTANT

## 2022-05-26 PROCEDURE — 99215 OFFICE O/P EST HI 40 MIN: CPT | Mod: S$PBB,,, | Performed by: PHYSICIAN ASSISTANT

## 2022-05-26 PROCEDURE — 99215 PR OFFICE/OUTPT VISIT, EST, LEVL V, 40-54 MIN: ICD-10-PCS | Mod: S$PBB,,, | Performed by: PHYSICIAN ASSISTANT

## 2022-05-26 PROCEDURE — 99213 OFFICE O/P EST LOW 20 MIN: CPT | Mod: PBBFAC | Performed by: PHYSICIAN ASSISTANT

## 2022-05-26 PROCEDURE — 99999 PR PBB SHADOW E&M-EST. PATIENT-LVL III: ICD-10-PCS | Mod: PBBFAC,,, | Performed by: PHYSICIAN ASSISTANT

## 2022-05-26 NOTE — PROGRESS NOTES
Subjective:       Patient ID: Kellie Beckett is a 67 y.o. female.    HPI   PCP referral Dr. Persaud     Lifestyle related medical issues:   Obesity   Anemia   Hypertension   Past Medical History:   Diagnosis Date    Anemia     iron and b12 deficiency    Glaucoma     Hypertension      Social Determinants of Health with Concerns     Financial Resource Strain: Unknown    Difficulty of Paying Living Expenses: Patient refused   Food Insecurity: Unknown    Worried About Running Out of Food in the Last Year: Patient refused    Ran Out of Food in the Last Year: Patient refused   Physical Activity: Inactive    Days of Exercise per Week: 0 days    Minutes of Exercise per Session: 0 min   Stress: Unknown    Feeling of Stress : Patient refused   Social Connections: Unknown    Frequency of Communication with Friends and Family: More than three times a week    Frequency of Social Gatherings with Friends and Family: Once a week    Attends Jain Services: Not on file    Active Member of Clubs or Organizations: Yes    Attends Club or Organization Meetings: More than 4 times per year    Marital Status:      Past Surgical History:   Procedure Laterality Date    GASTRIC BYPASS  2004    GLAUCOMA SURGERY      right eye     Family History   Problem Relation Age of Onset    Cataracts Mother     Cancer Mother         bone    Cataracts Father     Glaucoma Father     Diabetes Father     Heart disease Father     Hypertension Father     Cancer Maternal Aunt         colon       Patient has history of gastric surgery and a 100lbs weight loss       Physical activity: low  Diet: varied.  She has incorporated increasing fruits, veggies and lean proteins.  She has lost 15 lbs since doing this   Sleep: on/off   Stress: moderate   Overall wellbeing: good   Social support: good   Barriers to goals: time       Weight goal 200    Wt Readings from Last 3 Encounters:   05/26/22 129.8 kg (286 lb 2.5 oz)   04/04/22 133.3  "kg (293 lb 14 oz)   01/28/22 (!) 136.1 kg (300 lb 0.7 oz)       Current stage of change preparation   Next stage of change action       Current Outpatient Medications   Medication Instructions    acetaminophen (TYLENOL) 325 mg, Oral, As needed (PRN)    AZOPT 1 % ophthalmic suspension No dose, route, or frequency recorded.    bimatoprost (LUMIGAN OPHT) 1 drop, Ophthalmic, Daily    blood sugar diagnostic (BLOOD GLUCOSE TEST) Strp check sugars once daily for fluctuating blood sugarsProvide strips per patient's choice and insurance coverage    celecoxib (CELEBREX) 200 mg, Oral, Daily PRN    cholecalciferol, vitamin D3, (VITAMIN D3) 25 mcg (1,000 unit) capsule No dose, route, or frequency recorded.    COMBIGAN 0.2-0.5 % Drop 1 drop, Both Eyes, 2 times daily    cyanocobalamin 500 mcg, Daily    hydroCHLOROthiazide (HYDRODIURIL) 25 mg, Oral, Daily    lidocaine-prilocaine (EMLA) cream No dose, route, or frequency recorded.    lisinopriL (PRINIVIL,ZESTRIL) 2.5 mg, Oral, Every other day    multivitamin capsule 1 capsule, Daily    naproxen sodium (ANAPROX) 220 mg, Oral, As needed (PRN)    nystatin (NYSTOP) powder APPLY TO THE AFFECTED AREA THREE TIMES DAILY    rhubarb root extract (ESTROVEN CMPLT MENOPAUSE RLF ORAL) Oral    rosuvastatin (CRESTOR) 10 mg, Oral, Daily    TURMERIC-TURMERIC ROOT EXTRACT ORAL No dose, route, or frequency recorded.            Review of Systems   Constitutional: Negative for fatigue, fever and unexpected weight change.   HENT: Negative for sore throat and trouble swallowing.    Respiratory: Negative for cough and shortness of breath.    Cardiovascular: Negative for chest pain.   Gastrointestinal: Negative for abdominal pain.   Hematological: Negative for adenopathy. Does not bruise/bleed easily.   All other systems reviewed and are negative.      Objective:   BP (!) 140/76   Ht 5' 4" (1.626 m)   Wt 129.8 kg (286 lb 2.5 oz)   BMI 49.12 kg/m²      Physical Exam  Constitutional:       " General: She is not in acute distress.     Appearance: Normal appearance. She is well-developed. She is obese.   HENT:      Head: Normocephalic and atraumatic.   Pulmonary:      Effort: Pulmonary effort is normal.   Abdominal:      Palpations: Abdomen is soft.   Neurological:      Mental Status: She is alert and oriented to person, place, and time.   Psychiatric:         Behavior: Behavior normal.           Lab Results   Component Value Date    WBC 6.50 07/15/2021    HGB 12.0 07/15/2021    HCT 39.1 07/15/2021     07/15/2021    CHOL 139 07/15/2021    TRIG 36 07/15/2021    HDL 85 (H) 07/15/2021    ALT 25 07/15/2021    AST 25 07/15/2021     07/15/2021    K 4.2 07/15/2021     07/15/2021    CREATININE 0.8 07/15/2021    BUN 22 07/15/2021    CO2 29 07/15/2021    TSH 1.131 05/27/2021    HGBA1C 5.0 06/13/2019       Assessment:       1. Hypoglycemia    2. History of anemia    3. At risk for coronary artery disease    4. Essential hypertension    5. Morbid obesity with BMI of 40.0-44.9, adult    6. Vitamin D deficiency        Plan:   Hypoglycemia  -     INSULIN, RANDOM; Future; Expected date: 05/26/2022  -     Hemoglobin A1C; Future; Expected date: 05/26/2022    History of anemia  -     IRON AND TIBC; Future; Expected date: 05/26/2022    At risk for coronary artery disease    Essential hypertension    Morbid obesity with BMI of 40.0-44.9, adult    Vitamin D deficiency        4 week goal:      Physical Activity  -increase moderately    Diet  -continue diet changes, plant heavy    Sleep - sleep hygeine     magnesium   b vitamins   Continue to hydrate   Labs         Orders Placed This Encounter   Procedures    INSULIN, RANDOM    Hemoglobin A1C    IRON AND TIBC     Time spent: 60 minutes in face to face and with review prior and after of chart notes from specialists, in regards to diagnosis, prognosis, review of lab and test results, benefits of treatment as well as management of disease, counseling of  patient and coordination of care between various health care providers .

## 2022-05-30 ENCOUNTER — LAB VISIT (OUTPATIENT)
Dept: LAB | Facility: HOSPITAL | Age: 67
End: 2022-05-30
Payer: MEDICARE

## 2022-05-30 DIAGNOSIS — E16.2 HYPOGLYCEMIA: ICD-10-CM

## 2022-05-30 DIAGNOSIS — Z86.2 HISTORY OF ANEMIA: ICD-10-CM

## 2022-05-30 LAB
ESTIMATED AVG GLUCOSE: 105 MG/DL (ref 68–131)
HBA1C MFR BLD: 5.3 % (ref 4–5.6)
INSULIN COLLECTION INTERVAL: NORMAL
INSULIN SERPL-ACNC: 12.9 UU/ML
IRON SERPL-MCNC: 74 UG/DL (ref 30–160)
SATURATED IRON: 18 % (ref 20–50)
TOTAL IRON BINDING CAPACITY: 411 UG/DL (ref 250–450)
TRANSFERRIN SERPL-MCNC: 278 MG/DL (ref 200–375)

## 2022-05-30 PROCEDURE — 83036 HEMOGLOBIN GLYCOSYLATED A1C: CPT | Performed by: PHYSICIAN ASSISTANT

## 2022-05-30 PROCEDURE — 83525 ASSAY OF INSULIN: CPT | Performed by: PHYSICIAN ASSISTANT

## 2022-05-30 PROCEDURE — 84466 ASSAY OF TRANSFERRIN: CPT | Performed by: PHYSICIAN ASSISTANT

## 2022-06-01 ENCOUNTER — PATIENT MESSAGE (OUTPATIENT)
Dept: OBSTETRICS AND GYNECOLOGY | Facility: CLINIC | Age: 67
End: 2022-06-01
Payer: COMMERCIAL

## 2022-06-02 ENCOUNTER — TELEPHONE (OUTPATIENT)
Dept: OBSTETRICS AND GYNECOLOGY | Facility: CLINIC | Age: 67
End: 2022-06-02
Payer: COMMERCIAL

## 2022-06-02 ENCOUNTER — PATIENT MESSAGE (OUTPATIENT)
Dept: OBSTETRICS AND GYNECOLOGY | Facility: CLINIC | Age: 67
End: 2022-06-02
Payer: COMMERCIAL

## 2022-06-02 DIAGNOSIS — Z12.31 ENCOUNTER FOR SCREENING MAMMOGRAM FOR BREAST CANCER: Primary | ICD-10-CM

## 2022-06-02 NOTE — TELEPHONE ENCOUNTER
Please call pt to help her schedule her mmg, order has been placed she does not need gyn annual until May 28,  2023

## 2022-06-13 ENCOUNTER — HOSPITAL ENCOUNTER (OUTPATIENT)
Dept: RADIOLOGY | Facility: HOSPITAL | Age: 67
Discharge: HOME | End: 2022-06-13
Attending: NURSE PRACTITIONER
Payer: MEDICARE

## 2022-06-13 VITALS — BODY MASS INDEX: 48.86 KG/M2 | WEIGHT: 286.19 LBS | HEIGHT: 64 IN

## 2022-06-13 DIAGNOSIS — Z12.31 ENCOUNTER FOR SCREENING MAMMOGRAM FOR BREAST CANCER: ICD-10-CM

## 2022-06-13 PROCEDURE — 77067 SCR MAMMO BI INCL CAD: CPT | Mod: TC

## 2022-06-13 PROCEDURE — 77063 BREAST TOMOSYNTHESIS BI: CPT | Mod: 26,,, | Performed by: RADIOLOGY

## 2022-06-13 PROCEDURE — 77067 SCR MAMMO BI INCL CAD: CPT | Mod: 26,,, | Performed by: RADIOLOGY

## 2022-06-13 PROCEDURE — 77063 BREAST TOMOSYNTHESIS BI: CPT | Mod: TC

## 2022-06-13 PROCEDURE — 77063 MAMMO DIGITAL SCREENING BILAT WITH TOMO: ICD-10-PCS | Mod: 26,,, | Performed by: RADIOLOGY

## 2022-06-13 PROCEDURE — 77067 MAMMO DIGITAL SCREENING BILAT WITH TOMO: ICD-10-PCS | Mod: 26,,, | Performed by: RADIOLOGY

## 2022-07-20 ENCOUNTER — PATIENT MESSAGE (OUTPATIENT)
Dept: ADMINISTRATIVE | Facility: OTHER | Age: 67
End: 2022-07-20
Payer: COMMERCIAL

## 2022-07-21 ENCOUNTER — LAB VISIT (OUTPATIENT)
Dept: LAB | Facility: HOSPITAL | Age: 67
End: 2022-07-21
Attending: INTERNAL MEDICINE
Payer: MEDICARE

## 2022-07-21 DIAGNOSIS — E78.5 HYPERLIPIDEMIA, UNSPECIFIED HYPERLIPIDEMIA TYPE: ICD-10-CM

## 2022-07-21 DIAGNOSIS — E55.9 VITAMIN D DEFICIENCY: ICD-10-CM

## 2022-07-21 LAB
ALBUMIN SERPL BCP-MCNC: 3.6 G/DL (ref 3.5–5.2)
ALP SERPL-CCNC: 93 U/L (ref 55–135)
ALT SERPL W/O P-5'-P-CCNC: 28 U/L (ref 10–44)
ANION GAP SERPL CALC-SCNC: 11 MMOL/L (ref 8–16)
AST SERPL-CCNC: 21 U/L (ref 10–40)
BASOPHILS # BLD AUTO: 0.06 K/UL (ref 0–0.2)
BASOPHILS NFR BLD: 0.7 % (ref 0–1.9)
BILIRUB SERPL-MCNC: 0.6 MG/DL (ref 0.1–1)
BUN SERPL-MCNC: 19 MG/DL (ref 8–23)
CALCIUM SERPL-MCNC: 10 MG/DL (ref 8.7–10.5)
CHLORIDE SERPL-SCNC: 101 MMOL/L (ref 95–110)
CHOLEST SERPL-MCNC: 160 MG/DL (ref 120–199)
CHOLEST/HDLC SERPL: 1.9 {RATIO} (ref 2–5)
CO2 SERPL-SCNC: 26 MMOL/L (ref 23–29)
CREAT SERPL-MCNC: 0.7 MG/DL (ref 0.5–1.4)
DIFFERENTIAL METHOD: ABNORMAL
EOSINOPHIL # BLD AUTO: 0.2 K/UL (ref 0–0.5)
EOSINOPHIL NFR BLD: 1.9 % (ref 0–8)
ERYTHROCYTE [DISTWIDTH] IN BLOOD BY AUTOMATED COUNT: 12.8 % (ref 11.5–14.5)
EST. GFR  (AFRICAN AMERICAN): >60 ML/MIN/1.73 M^2
EST. GFR  (NON AFRICAN AMERICAN): >60 ML/MIN/1.73 M^2
GLUCOSE SERPL-MCNC: 84 MG/DL (ref 70–110)
HCT VFR BLD AUTO: 38.9 % (ref 37–48.5)
HDLC SERPL-MCNC: 85 MG/DL (ref 40–75)
HDLC SERPL: 53.1 % (ref 20–50)
HGB BLD-MCNC: 12 G/DL (ref 12–16)
IMM GRANULOCYTES # BLD AUTO: 0.03 K/UL (ref 0–0.04)
IMM GRANULOCYTES NFR BLD AUTO: 0.3 % (ref 0–0.5)
LDLC SERPL CALC-MCNC: 67 MG/DL (ref 63–159)
LYMPHOCYTES # BLD AUTO: 1.6 K/UL (ref 1–4.8)
LYMPHOCYTES NFR BLD: 18 % (ref 18–48)
MCH RBC QN AUTO: 29.8 PG (ref 27–31)
MCHC RBC AUTO-ENTMCNC: 30.8 G/DL (ref 32–36)
MCV RBC AUTO: 97 FL (ref 82–98)
MONOCYTES # BLD AUTO: 0.6 K/UL (ref 0.3–1)
MONOCYTES NFR BLD: 6.8 % (ref 4–15)
NEUTROPHILS # BLD AUTO: 6.4 K/UL (ref 1.8–7.7)
NEUTROPHILS NFR BLD: 72.3 % (ref 38–73)
NONHDLC SERPL-MCNC: 75 MG/DL
NRBC BLD-RTO: 0 /100 WBC
PLATELET # BLD AUTO: 418 K/UL (ref 150–450)
PMV BLD AUTO: 10.6 FL (ref 9.2–12.9)
POTASSIUM SERPL-SCNC: 4 MMOL/L (ref 3.5–5.1)
PROT SERPL-MCNC: 7.4 G/DL (ref 6–8.4)
RBC # BLD AUTO: 4.03 M/UL (ref 4–5.4)
SODIUM SERPL-SCNC: 138 MMOL/L (ref 136–145)
TRIGL SERPL-MCNC: 40 MG/DL (ref 30–150)
TSH SERPL DL<=0.005 MIU/L-ACNC: 1.32 UIU/ML (ref 0.4–4)
WBC # BLD AUTO: 8.79 K/UL (ref 3.9–12.7)

## 2022-07-21 PROCEDURE — 36415 COLL VENOUS BLD VENIPUNCTURE: CPT | Performed by: INTERNAL MEDICINE

## 2022-07-21 PROCEDURE — 80061 LIPID PANEL: CPT | Performed by: INTERNAL MEDICINE

## 2022-07-21 PROCEDURE — 80053 COMPREHEN METABOLIC PANEL: CPT | Performed by: INTERNAL MEDICINE

## 2022-07-21 PROCEDURE — 82652 VIT D 1 25-DIHYDROXY: CPT | Performed by: INTERNAL MEDICINE

## 2022-07-21 PROCEDURE — 84443 ASSAY THYROID STIM HORMONE: CPT | Performed by: INTERNAL MEDICINE

## 2022-07-21 PROCEDURE — 85025 COMPLETE CBC W/AUTO DIFF WBC: CPT | Performed by: INTERNAL MEDICINE

## 2022-07-25 LAB — 1,25(OH)2D3 SERPL-MCNC: 60 PG/ML (ref 20–79)

## 2022-08-11 ENCOUNTER — OFFICE VISIT (OUTPATIENT)
Dept: PRIMARY CARE CLINIC | Facility: CLINIC | Age: 67
End: 2022-08-11
Payer: MEDICARE

## 2022-08-11 VITALS
SYSTOLIC BLOOD PRESSURE: 126 MMHG | WEIGHT: 284.19 LBS | BODY MASS INDEX: 50.36 KG/M2 | RESPIRATION RATE: 18 BRPM | TEMPERATURE: 97 F | HEIGHT: 63 IN | DIASTOLIC BLOOD PRESSURE: 75 MMHG | HEART RATE: 84 BPM

## 2022-08-11 DIAGNOSIS — H40.1190 CHRONIC OPEN ANGLE GLAUCOMA, UNSPECIFIED GLAUCOMA STAGE, UNSPECIFIED LATERALITY: ICD-10-CM

## 2022-08-11 DIAGNOSIS — E66.01 MORBID OBESITY WITH BMI OF 40.0-44.9, ADULT: ICD-10-CM

## 2022-08-11 DIAGNOSIS — E55.9 VITAMIN D DEFICIENCY: ICD-10-CM

## 2022-08-11 DIAGNOSIS — Z86.2 HISTORY OF ANEMIA: ICD-10-CM

## 2022-08-11 DIAGNOSIS — I10 ESSENTIAL HYPERTENSION: Primary | ICD-10-CM

## 2022-08-11 DIAGNOSIS — M17.0 PRIMARY OSTEOARTHRITIS OF BOTH KNEES: ICD-10-CM

## 2022-08-11 PROCEDURE — 99999 PR PBB SHADOW E&M-EST. PATIENT-LVL V: ICD-10-PCS | Mod: PBBFAC,,, | Performed by: FAMILY MEDICINE

## 2022-08-11 PROCEDURE — 99215 OFFICE O/P EST HI 40 MIN: CPT | Mod: PBBFAC,PN | Performed by: FAMILY MEDICINE

## 2022-08-11 PROCEDURE — 99999 PR PBB SHADOW E&M-EST. PATIENT-LVL V: CPT | Mod: PBBFAC,,, | Performed by: FAMILY MEDICINE

## 2022-08-11 PROCEDURE — 99215 PR OFFICE/OUTPT VISIT, EST, LEVL V, 40-54 MIN: ICD-10-PCS | Mod: S$PBB,,, | Performed by: FAMILY MEDICINE

## 2022-08-11 PROCEDURE — 99215 OFFICE O/P EST HI 40 MIN: CPT | Mod: S$PBB,,, | Performed by: FAMILY MEDICINE

## 2022-08-11 RX ORDER — BIMATOPROST 0.1 MG/ML
SOLUTION/ DROPS OPHTHALMIC
COMMUNITY
Start: 2022-07-11

## 2022-08-11 RX ORDER — HYDROCHLOROTHIAZIDE 25 MG/1
25 TABLET ORAL DAILY
Qty: 90 TABLET | Refills: 3 | Status: SHIPPED | OUTPATIENT
Start: 2022-08-11 | End: 2023-08-09

## 2022-08-11 RX ORDER — NETARSUDIL 0.2 MG/ML
SOLUTION/ DROPS OPHTHALMIC; TOPICAL
COMMUNITY
Start: 2022-07-23

## 2022-08-11 RX ORDER — ACETAMINOPHEN 500 MG
2000 TABLET ORAL DAILY
COMMUNITY

## 2022-08-11 NOTE — PROGRESS NOTES
Subjective:      Patient ID: Kellie Beckett is a 67 y.o. female.    Chief Complaint: Labs Only    Disclaimer:  This note is prepared using voice recognition software and as such is likely to have errors and has not been proof read. Please contact me for questions.      Pt is a 66 yo F coming in today for primary care and to review labs and establish with new PCP.   Prior PCP: Hunter Persaud MD   has glaucoma. bp was ok up until she had eye surgery. Is on digital htn program. Was put back on losartan at 50mg, and then tried cutting I tin 1/2 and still too low.  BP levels were too low and fatigued and tired.   Then changed to 2.5mg of lisinopril. Knew if she didn't take anything BP would go too high. Is active in the digital HTN program.     Went to PT for knee a few years back.    Is frustrated about low iron and vit D levels not getting repeated testing.   Originally from [facility], however worked with [employer] in [place].  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     Vit D is better now.   Colonoscopy due in Dec 2022 with Dr. Mittal.   On iron with vit C for iron saturation and hx of anemia.   Working on diet and wt loss with [facility].     Patient Active Problem List:     Anemia     Essential hypertension     COAG (chronic open-angle glaucoma)     Morbid obesity with BMI of 40.0-44.9, adult     History of gastric bypass     At risk for coronary artery disease     Bilateral sensorineural hearing loss     Vitamin D deficiency     History of anemia     Chronic right-sided low back pain with right-sided sciatica     Primary osteoarthritis of both knees     Right hip pain     Benign paroxysmal positional vertigo     Chronic diarrhea     Chronic pain        Lab Results   Component Value Date    HGBA1C 5.3 05/30/2022    HGBA1C 5.0 06/13/2019    HGBA1C 5.2 09/12/2014      Lab Results   Component Value Date    CHOL 160 07/21/2022    CHOL 139 07/15/2021    CHOL 133 06/09/2020     Lab Results   Component Value Date    LDLCALC  67.0 07/21/2022    LDLCALC 46.8 (L) 07/15/2021    LDLCALC 45.4 (L) 06/09/2020       Wt Readings from Last 10 Encounters:   08/11/22 128.9 kg (284 lb 2.8 oz)   06/13/22 129.8 kg (286 lb 2.5 oz)   05/26/22 129.8 kg (286 lb 2.5 oz)   04/04/22 133.3 kg (293 lb 14 oz)   01/28/22 (!) 136.1 kg (300 lb 0.7 oz)   07/13/21 133.7 kg (294 lb 12.1 oz)   05/28/21 131.6 kg (290 lb 2 oz)   05/27/21 131.6 kg (290 lb 2 oz)   01/13/21 133.5 kg (294 lb 5 oz)   06/23/20 124 kg (273 lb 5.9 oz)       The 10-year ASCVD risk score (Aline GAN Jr., et al., 2013) is: 7.7%    Values used to calculate the score:      Age: 67 years      Sex: Female      Is Non- : Yes      Diabetic: No      Tobacco smoker: No      Systolic Blood Pressure: 126 mmHg      Is BP treated: Yes      HDL Cholesterol: 85 mg/dL      Total Cholesterol: 160 mg/dL        Lab Results   Component Value Date    WBC 8.79 07/21/2022    HGB 12.0 07/21/2022    HCT 38.9 07/21/2022     07/21/2022    CHOL 160 07/21/2022    TRIG 40 07/21/2022    HDL 85 (H) 07/21/2022    ALT 28 07/21/2022    AST 21 07/21/2022     07/21/2022    K 4.0 07/21/2022     07/21/2022    CREATININE 0.7 07/21/2022    BUN 19 07/21/2022    CO2 26 07/21/2022    TSH 1.317 07/21/2022    HGBA1C 5.3 05/30/2022       Mammo Digital Screening Bilat w/ Fabián  Narrative: Result:  Mammo Digital Screening Bilat w/ Fabián    History:  Patient is 67 y.o. and is seen for a screening mammogram.    Films Compared:  Prior images (if available) were compared.     Findings:   This procedure was performed using tomosynthesis.   Computer-aided detection was utilized in the interpretation of this   examination.    The breasts have scattered areas of fibroglandular density. There is no   evidence of suspicious masses, microcalcifications or architectural   distortion.  Impression:    No mammographic evidence of malignancy.    BI-RADS Category 1: Negative    Recommendation:  Routine screening mammogram in  "1 year is recommended.    Your estimated lifetime risk of breast cancer (to age 85) based on   Tyrer-Cuzick risk assessment model is 5.71 %.  According to the American   Cancer Society, patients with a lifetime breast cancer risk of 20% or   higher might benefit from supplemental screening tests. ??         Review of Systems   Constitutional: Negative for chills, fatigue and fever.   HENT: Negative for ear pain and trouble swallowing.    Eyes: Negative for pain and visual disturbance.   Respiratory: Negative for cough and shortness of breath.    Cardiovascular: Negative for chest pain and leg swelling.   Gastrointestinal: Negative for abdominal pain, blood in stool, nausea and vomiting.   Endocrine: Negative for cold intolerance and heat intolerance.   Genitourinary: Negative for dysuria and frequency.   Musculoskeletal: Negative for joint swelling, myalgias and neck pain.   Skin: Negative for color change and rash.   Neurological: Negative for dizziness and headaches.   Psychiatric/Behavioral: Negative for behavioral problems and sleep disturbance.     Objective:     Vitals:    08/11/22 0947 08/11/22 1047   BP: (!) 158/88 126/75   Pulse: 84    Resp: 18    Temp: 97.3 °F (36.3 °C)    Weight: 128.9 kg (284 lb 2.8 oz)    Height: 5' 3" (1.6 m)      Physical Exam  Vitals reviewed.   Constitutional:       Appearance: Normal appearance. She is well-developed and well-groomed. She is morbidly obese.   HENT:      Head: Normocephalic and atraumatic.      Right Ear: Tympanic membrane and external ear normal.      Left Ear: Tympanic membrane and external ear normal.      Nose: Nose normal.      Mouth/Throat:      Mouth: Mucous membranes are moist.      Pharynx: Oropharynx is clear.   Eyes:      Conjunctiva/sclera: Conjunctivae normal.      Pupils: Pupils are equal, round, and reactive to light.   Neck:      Thyroid: No thyromegaly.   Cardiovascular:      Rate and Rhythm: Normal rate and regular rhythm.      Heart sounds: No " murmur heard.    No friction rub. No gallop.   Pulmonary:      Effort: Pulmonary effort is normal. No respiratory distress.      Breath sounds: No wheezing or rales.   Abdominal:      General: Bowel sounds are normal. There is no distension.      Palpations: Abdomen is soft.      Tenderness: There is no abdominal tenderness. There is no rebound.   Musculoskeletal:         General: Normal range of motion.      Cervical back: Normal range of motion and neck supple.   Lymphadenopathy:      Cervical: No cervical adenopathy.   Skin:     General: Skin is warm and dry.      Findings: No rash.          Neurological:      Mental Status: She is alert and oriented to person, place, and time.   Psychiatric:         Attention and Perception: Attention and perception normal.         Mood and Affect: Mood and affect normal.         Speech: Speech normal.         Behavior: Behavior normal. Behavior is cooperative.         Thought Content: Thought content normal.         Cognition and Memory: Cognition and memory normal.         Judgment: Judgment normal.       Assessment:     1. Essential hypertension    2. Vitamin D deficiency    3. History of anemia    4. Morbid obesity with BMI of 40.0-44.9, adult    5. Primary osteoarthritis of both knees    6. Chronic open angle glaucoma, unspecified glaucoma stage, unspecified laterality      Plan:   Kellie was seen today for labs only, est care.     Diagnoses and all orders for this visit:    Essential hypertension- refilled medications today.  Is with the digital hypertension program readings at home seem to be doing better.  Continue with diet exercise and weight loss.  -     hydroCHLOROthiazide (HYDRODIURIL) 25 MG tablet; Take 1 tablet (25 mg total) by mouth once daily.    Vitamin D deficiency-improved now doing 2000 IU daily    History of anemia-improved now doing ferrous gluconate 27 mg daily with vitamin-C.  Is due for colonoscopy in December    Morbid obesity with BMI of 40.0-44.9,  adult-is down 16 lb continue with diet exercise and weight loss    Primary osteoarthritis of both knees limit some of her mobility however doing better now.      Glaucoma-continue still with eye specialist  Other orders  -     ferrous gluconate 225 mg (27 mg iron) Tab; 1 tablet po daily with vit C OTC      Patient would like to be seen in 6 months for follow-up of chronic issues can see my primary care partner and then yearly for annual exam with labs prior.    There are no preventive care reminders to display for this patient.    Follow up in about 6 months (around 2/11/2023) for f/u OWEN De Oliveira/ 6 mo .    There are no Patient Instructions on file for this visit.            45 minutes of total time spent on the encounter, which includes face to face time and non-face to face time preparing to see the patient (eg, review of tests), Obtaining and/or reviewing separately obtained history, Documenting clinical information in the electronic or other health record, Independently interpreting results (not separately reported) and communicating results to the patient/family/caregiver, or Care coordination (not separately reported).

## 2023-02-07 LAB — CRC RECOMMENDATION EXT: NORMAL

## 2023-02-09 ENCOUNTER — OFFICE VISIT (OUTPATIENT)
Dept: PRIMARY CARE CLINIC | Facility: CLINIC | Age: 68
End: 2023-02-09
Payer: MEDICARE

## 2023-02-09 VITALS
HEIGHT: 63 IN | TEMPERATURE: 99 F | DIASTOLIC BLOOD PRESSURE: 78 MMHG | WEIGHT: 282.75 LBS | BODY MASS INDEX: 50.1 KG/M2 | SYSTOLIC BLOOD PRESSURE: 128 MMHG | HEART RATE: 95 BPM

## 2023-02-09 DIAGNOSIS — I10 ESSENTIAL HYPERTENSION: ICD-10-CM

## 2023-02-09 DIAGNOSIS — T14.8XXA BRUISING: ICD-10-CM

## 2023-02-09 DIAGNOSIS — E78.5 HYPERLIPIDEMIA, UNSPECIFIED HYPERLIPIDEMIA TYPE: ICD-10-CM

## 2023-02-09 DIAGNOSIS — E55.9 VITAMIN D DEFICIENCY: ICD-10-CM

## 2023-02-09 DIAGNOSIS — E16.2 HYPOGLYCEMIA: ICD-10-CM

## 2023-02-09 DIAGNOSIS — Z00.00 ROUTINE GENERAL MEDICAL EXAMINATION AT A HEALTH CARE FACILITY: Primary | ICD-10-CM

## 2023-02-09 DIAGNOSIS — Z86.2 HISTORY OF ANEMIA: ICD-10-CM

## 2023-02-09 DIAGNOSIS — E61.1 LOW IRON: ICD-10-CM

## 2023-02-09 DIAGNOSIS — L30.9 DERMATITIS: ICD-10-CM

## 2023-02-09 PROCEDURE — 99215 OFFICE O/P EST HI 40 MIN: CPT | Mod: PBBFAC,PN | Performed by: FAMILY MEDICINE

## 2023-02-09 PROCEDURE — 99999 PR PBB SHADOW E&M-EST. PATIENT-LVL V: CPT | Mod: PBBFAC,,, | Performed by: FAMILY MEDICINE

## 2023-02-09 PROCEDURE — 99397 PER PM REEVAL EST PAT 65+ YR: CPT | Mod: S$PBB,GZ,, | Performed by: FAMILY MEDICINE

## 2023-02-09 PROCEDURE — 99397 PR PREVENTIVE VISIT,EST,65 & OVER: ICD-10-PCS | Mod: S$PBB,GZ,, | Performed by: FAMILY MEDICINE

## 2023-02-09 PROCEDURE — 99999 PR PBB SHADOW E&M-EST. PATIENT-LVL V: ICD-10-PCS | Mod: PBBFAC,,, | Performed by: FAMILY MEDICINE

## 2023-02-09 RX ORDER — ROSUVASTATIN CALCIUM 10 MG/1
10 TABLET, COATED ORAL DAILY
Qty: 90 TABLET | Refills: 3 | Status: SHIPPED | OUTPATIENT
Start: 2023-02-09 | End: 2024-02-12

## 2023-02-09 NOTE — PROGRESS NOTES
Subjective:      Patient ID: Kellie Beckett is a 67 y.o. female.    Chief Complaint: Follow-up      Patient is a 67 y.o. female coming in today  has a past medical history of Anemia, Glaucoma, and Hypertension.  She has Medicare as her primary but secondary [employer] blue [employer] insurance so she would like it build as an annual exam.  Recently did her colonoscopy this week.  Was told everything looked good she had a 10 year interval this is with Dr. Mittal.  Has noticed more bruising however.  Did have to hold her iron and B vitamins for the colonoscopy.  Occasionally will get a rash on her legs it started out as red blisters then turned into brown spots.  She is never seen Dermatology for this before.  Blood pressure is well controlled with lisinopril hydrochlorothiazide.  Cholesterol levels are controlled with Crestor.  Needing labs tomorrow.  Knees are doing better since seeing Orthopedics for some injections.    [facility] Peq Documents    2/6/2023  3:16 PM CST - Filed by Patient   Would you like a copy of [facility]'s Financial Assistance Policy Summary? No, I would not like a copy.     [facility] Peq Sdoh    2/6/2023  3:19 PM CST - Filed by Patient   On average, how many days per week do you engage in moderate to strenuous exercise (like a brisk walk)? Patient refused   On average, how many minutes do you engage in exercise at this level?    Do you feel stress - tense, restless, nervous, or anxious, or unable to sleep at night because your mind is troubled all the time - these days? Not at all   Do you belong to any clubs or organizations such as Congregational groups, unions, fraternal or athletic groups, or school groups? Yes   How often do you attend meetings of the clubs or organizations you belong to? More than 4 times per year   In a typical week, how many times do you talk on the phone with family, friends, or neighbors? More than three times a week   How often do you get together with friends or relatives? More  than three times a week   Are you , , , , never , or living with a partner?    How hard is it for you to pay for the very basics like food, housing, medical care, and heating? Not hard at all   Within the past 12 months, you worried that your food would run out before you got the money to buy more. Never true   Within the past 12 months, the food you bought just didnt last and you didnt have money to get more. Never true   In the past 12 months, has lack of transportation kept you from medical appointments or from getting medications? No   In the past 12 months, has lack of transportation kept you from meetings, work, or from getting things needed for daily living? No   How often do you have a drink containing alcohol? Never   How many drinks containing alcohol do you have on a typical day when you are drinking? Patient does not drink   How often do you have six or more drinks on one occasion? Never   In the last 12 months, was there a time when you were not able to pay the mortgage or rent on time? No   In the last 12 months, how many places have you lived? (range: at least 0)    In the last 12 months, was there a time when you did not have a steady place to sleep or slept in a shelter (including now)? No         Pmh, Psh, Family Hx, Social Hx, HM updated in Epic Tabs today.   Review of Systems   Constitutional:  Negative for activity change, appetite change, chills, fatigue and fever.   HENT:  Negative for ear pain and trouble swallowing.    Eyes:  Negative for pain and visual disturbance.   Respiratory:  Negative for cough and shortness of breath.    Cardiovascular:  Negative for chest pain and leg swelling.   Gastrointestinal:  Negative for abdominal pain, blood in stool, nausea and vomiting.   Endocrine: Negative for cold intolerance and heat intolerance.   Genitourinary:  Negative for dysuria and frequency.   Musculoskeletal:  Negative for joint swelling, myalgias  "and neck pain.   Skin:  Negative for color change and rash.   Neurological:  Negative for dizziness and headaches.   Hematological:  Bruises/bleeds easily.   Psychiatric/Behavioral:  Negative for behavioral problems and sleep disturbance.    Objective:     Vitals:    02/09/23 0852   BP: 128/78   Pulse: 95   Temp: 98.5 °F (36.9 °C)   Weight: 128.3 kg (282 lb 11.8 oz)   Height: 5' 3" (1.6 m)     Wt Readings from Last 10 Encounters:   02/09/23 128.3 kg (282 lb 11.8 oz)   08/11/22 128.9 kg (284 lb 2.8 oz)   06/13/22 129.8 kg (286 lb 2.5 oz)   05/26/22 129.8 kg (286 lb 2.5 oz)   04/04/22 133.3 kg (293 lb 14 oz)   01/28/22 (!) 136.1 kg (300 lb 0.7 oz)   07/13/21 133.7 kg (294 lb 12.1 oz)   05/28/21 131.6 kg (290 lb 2 oz)   05/27/21 131.6 kg (290 lb 2 oz)   01/13/21 133.5 kg (294 lb 5 oz)     Physical Exam  Vitals reviewed.   Constitutional:       Appearance: Normal appearance. She is well-developed. She is obese.   HENT:      Head: Normocephalic and atraumatic.      Right Ear: Tympanic membrane and external ear normal.      Left Ear: Tympanic membrane and external ear normal.      Nose: Nose normal.      Mouth/Throat:      Mouth: Mucous membranes are moist.      Pharynx: Oropharynx is clear.   Eyes:      Conjunctiva/sclera: Conjunctivae normal.      Pupils: Pupils are equal, round, and reactive to light.   Neck:      Thyroid: No thyromegaly.   Cardiovascular:      Rate and Rhythm: Normal rate and regular rhythm.      Heart sounds: Normal heart sounds. No murmur heard.    No friction rub. No gallop.   Pulmonary:      Effort: Pulmonary effort is normal. No respiratory distress.      Breath sounds: Normal breath sounds. No wheezing or rales.   Abdominal:      General: Bowel sounds are normal. There is no distension.      Palpations: Abdomen is soft.      Tenderness: There is no abdominal tenderness. There is no rebound.   Musculoskeletal:         General: Normal range of motion.      Cervical back: Normal range of motion and " neck supple.   Lymphadenopathy:      Cervical: No cervical adenopathy.   Skin:     General: Skin is warm and dry.      Findings: No rash.      Comments: Noted hemosiderin deposits at the bilateral knees at sites of injection as well as some on the lower extremities.   Neurological:      Mental Status: She is alert and oriented to person, place, and time.   Psychiatric:         Attention and Perception: Attention and perception normal.         Mood and Affect: Mood and affect normal.         Speech: Speech normal.         Behavior: Behavior normal.         Thought Content: Thought content normal.         Cognition and Memory: Cognition and memory normal.         Judgment: Judgment normal.     Assessment:     1. Routine general medical examination at a health care facility    2. Bruising    3. Hyperlipidemia, unspecified hyperlipidemia type    4. Essential hypertension    5. History of anemia    6. Hypoglycemia    7. Vitamin D deficiency    8. Low iron    9. Dermatitis        LABS:   Lab Results   Component Value Date    HGBA1C 5.3 05/30/2022    HGBA1C 5.0 06/13/2019    HGBA1C 5.2 09/12/2014      Lab Results   Component Value Date    CHOL 160 07/21/2022    CHOL 139 07/15/2021    CHOL 133 06/09/2020     Lab Results   Component Value Date    LDLCALC 67.0 07/21/2022    LDLCALC 46.8 (L) 07/15/2021    LDLCALC 45.4 (L) 06/09/2020     Lab Results   Component Value Date    WBC 8.79 07/21/2022    HGB 12.0 07/21/2022    HCT 38.9 07/21/2022     07/21/2022    CHOL 160 07/21/2022    TRIG 40 07/21/2022    HDL 85 (H) 07/21/2022    ALT 28 07/21/2022    AST 21 07/21/2022     07/21/2022    K 4.0 07/21/2022     07/21/2022    CREATININE 0.7 07/21/2022    BUN 19 07/21/2022    CO2 26 07/21/2022    TSH 1.317 07/21/2022    HGBA1C 5.3 05/30/2022       The 10-year ASCVD risk score ([place] BRENNAN, et al., 2019) is: 8%    Values used to calculate the score:      Age: 67 years      Sex: Female      Is Non- :  Yes      Diabetic: No      Tobacco smoker: No      Systolic Blood Pressure: 128 mmHg      Is BP treated: Yes      HDL Cholesterol: 85 mg/dL      Total Cholesterol: 160 mg/dL    Mammo Digital Screening Bilat w/ Fabián  Narrative: Result:  Mammo Digital Screening Bilat w/ Fabián    History:  Patient is 67 y.o. and is seen for a screening mammogram.    Films Compared:  Prior images (if available) were compared.     Findings:   This procedure was performed using tomosynthesis.   Computer-aided detection was utilized in the interpretation of this   examination.    The breasts have scattered areas of fibroglandular density. There is no   evidence of suspicious masses, microcalcifications or architectural   distortion.  Impression:    No mammographic evidence of malignancy.    BI-RADS Category 1: Negative    Recommendation:  Routine screening mammogram in 1 year is recommended.    Your estimated lifetime risk of breast cancer (to age 85) based on   Tyrer-Cuzick risk assessment model is 5.71 %.  According to the American   Cancer Society, patients with a lifetime breast cancer risk of 20% or   higher might benefit from supplemental screening tests. ??       Plan:   Kellie was seen today for follow-up.    Diagnoses and all orders for this visit:    Routine general medical examination at a health care facility  -     TSH; Future  -     T4, Free; Future  -     Lipid Panel; Future  -     Hemoglobin A1C; Future  -     Comprehensive Metabolic Panel; Future  -     CBC Auto Differential; Future  -     Insulin, Random; Future  -     Protime-INR; Future  -     Ferritin; Future    Bruising  -     TSH; Future  -     T4, Free; Future  -     Lipid Panel; Future  -     Hemoglobin A1C; Future  -     Comprehensive Metabolic Panel; Future  -     CBC Auto Differential; Future  -     Insulin, Random; Future  -     Protime-INR; Future  -     Ferritin; Future    Hyperlipidemia, unspecified hyperlipidemia type  -     TSH; Future  -     T4, Free;  Future  -     Lipid Panel; Future  -     Hemoglobin A1C; Future  -     Comprehensive Metabolic Panel; Future  -     CBC Auto Differential; Future  -     Insulin, Random; Future  -     Protime-INR; Future  -     Ferritin; Future  -     rosuvastatin (CRESTOR) 10 MG tablet; Take 1 tablet (10 mg total) by mouth once daily.    Essential hypertension  -     TSH; Future  -     T4, Free; Future  -     Lipid Panel; Future  -     Hemoglobin A1C; Future  -     Comprehensive Metabolic Panel; Future  -     CBC Auto Differential; Future  -     Insulin, Random; Future  -     Protime-INR; Future  -     Ferritin; Future    History of anemia  -     TSH; Future  -     T4, Free; Future  -     Lipid Panel; Future  -     Hemoglobin A1C; Future  -     Comprehensive Metabolic Panel; Future  -     CBC Auto Differential; Future  -     Insulin, Random; Future  -     Protime-INR; Future  -     Ferritin; Future    Hypoglycemia  -     TSH; Future  -     T4, Free; Future  -     Lipid Panel; Future  -     Hemoglobin A1C; Future  -     Comprehensive Metabolic Panel; Future  -     CBC Auto Differential; Future  -     Insulin, Random; Future  -     Protime-INR; Future  -     Ferritin; Future    Vitamin D deficiency  -     TSH; Future  -     T4, Free; Future  -     Lipid Panel; Future  -     Hemoglobin A1C; Future  -     Comprehensive Metabolic Panel; Future  -     CBC Auto Differential; Future  -     Insulin, Random; Future  -     Protime-INR; Future  -     Ferritin; Future    Low iron  -     TSH; Future  -     T4, Free; Future  -     Lipid Panel; Future  -     Hemoglobin A1C; Future  -     Comprehensive Metabolic Panel; Future  -     CBC Auto Differential; Future  -     Insulin, Random; Future  -     Protime-INR; Future  -     Ferritin; Future    Dermatitis  -     Ambulatory referral/consult to Dermatology; Future    - above diagnosis were discussed and reviewed today during visit. The conditions are stable.  Labs ordered.  Continue with current  medications and interventions until labs are reviewed to make adjustments.  Will check for iron studies as well as INR for bruising.  Can place referral for Dermatology for the dermatitis that occurs occasionally for the patient.  Follow-up based on results.  Obtain copies of colonoscopy.  Work on diet exercise and weight loss.  Patient would like to have this billed as her annual exam since her secondary insurance is [employer] cross [employer]      There are no Patient Instructions on file for this visit.    Follow up in about 1 year (around 2/9/2024) for physical with Dr CAGE.

## 2023-02-10 ENCOUNTER — LAB VISIT (OUTPATIENT)
Dept: LAB | Facility: HOSPITAL | Age: 68
End: 2023-02-10
Attending: FAMILY MEDICINE
Payer: MEDICARE

## 2023-02-10 DIAGNOSIS — Z86.2 HISTORY OF ANEMIA: ICD-10-CM

## 2023-02-10 DIAGNOSIS — E78.5 HYPERLIPIDEMIA, UNSPECIFIED HYPERLIPIDEMIA TYPE: ICD-10-CM

## 2023-02-10 DIAGNOSIS — E16.2 HYPOGLYCEMIA: ICD-10-CM

## 2023-02-10 DIAGNOSIS — E61.1 LOW IRON: ICD-10-CM

## 2023-02-10 DIAGNOSIS — T14.8XXA BRUISING: ICD-10-CM

## 2023-02-10 DIAGNOSIS — Z00.00 ROUTINE GENERAL MEDICAL EXAMINATION AT A HEALTH CARE FACILITY: ICD-10-CM

## 2023-02-10 DIAGNOSIS — I10 ESSENTIAL HYPERTENSION: ICD-10-CM

## 2023-02-10 DIAGNOSIS — E55.9 VITAMIN D DEFICIENCY: ICD-10-CM

## 2023-02-10 LAB
INR PPP: 1.1 (ref 0.8–1.2)
PROTHROMBIN TIME: 11.3 SEC (ref 9–12.5)

## 2023-02-10 PROCEDURE — 36415 COLL VENOUS BLD VENIPUNCTURE: CPT | Mod: PN | Performed by: FAMILY MEDICINE

## 2023-02-10 PROCEDURE — 84443 ASSAY THYROID STIM HORMONE: CPT | Performed by: FAMILY MEDICINE

## 2023-02-10 PROCEDURE — 85610 PROTHROMBIN TIME: CPT | Performed by: FAMILY MEDICINE

## 2023-02-10 PROCEDURE — 83036 HEMOGLOBIN GLYCOSYLATED A1C: CPT | Performed by: FAMILY MEDICINE

## 2023-02-10 PROCEDURE — 80061 LIPID PANEL: CPT | Performed by: FAMILY MEDICINE

## 2023-02-10 PROCEDURE — 80053 COMPREHEN METABOLIC PANEL: CPT | Performed by: FAMILY MEDICINE

## 2023-02-10 PROCEDURE — 84439 ASSAY OF FREE THYROXINE: CPT | Performed by: FAMILY MEDICINE

## 2023-02-10 PROCEDURE — 85025 COMPLETE CBC W/AUTO DIFF WBC: CPT | Performed by: FAMILY MEDICINE

## 2023-02-10 PROCEDURE — 82728 ASSAY OF FERRITIN: CPT | Performed by: FAMILY MEDICINE

## 2023-02-10 PROCEDURE — 83525 ASSAY OF INSULIN: CPT | Performed by: FAMILY MEDICINE

## 2023-02-11 LAB
ALBUMIN SERPL BCP-MCNC: 3.6 G/DL (ref 3.5–5.2)
ALP SERPL-CCNC: 107 U/L (ref 55–135)
ALT SERPL W/O P-5'-P-CCNC: 30 U/L (ref 10–44)
ANION GAP SERPL CALC-SCNC: 9 MMOL/L (ref 8–16)
AST SERPL-CCNC: 18 U/L (ref 10–40)
BASOPHILS # BLD AUTO: 0.05 K/UL (ref 0–0.2)
BASOPHILS NFR BLD: 0.5 % (ref 0–1.9)
BILIRUB SERPL-MCNC: 0.6 MG/DL (ref 0.1–1)
BUN SERPL-MCNC: 17 MG/DL (ref 8–23)
CALCIUM SERPL-MCNC: 9.8 MG/DL (ref 8.7–10.5)
CHLORIDE SERPL-SCNC: 99 MMOL/L (ref 95–110)
CHOLEST SERPL-MCNC: 168 MG/DL (ref 120–199)
CHOLEST/HDLC SERPL: 1.9 {RATIO} (ref 2–5)
CO2 SERPL-SCNC: 28 MMOL/L (ref 23–29)
CREAT SERPL-MCNC: 0.7 MG/DL (ref 0.5–1.4)
DIFFERENTIAL METHOD: ABNORMAL
EOSINOPHIL # BLD AUTO: 0.2 K/UL (ref 0–0.5)
EOSINOPHIL NFR BLD: 1.6 % (ref 0–8)
ERYTHROCYTE [DISTWIDTH] IN BLOOD BY AUTOMATED COUNT: 12.3 % (ref 11.5–14.5)
EST. GFR  (NO RACE VARIABLE): >60 ML/MIN/1.73 M^2
ESTIMATED AVG GLUCOSE: 94 MG/DL (ref 68–131)
FERRITIN SERPL-MCNC: 147 NG/ML (ref 20–300)
GLUCOSE SERPL-MCNC: 96 MG/DL (ref 70–110)
HBA1C MFR BLD: 4.9 % (ref 4–5.6)
HCT VFR BLD AUTO: 40.8 % (ref 37–48.5)
HDLC SERPL-MCNC: 87 MG/DL (ref 40–75)
HDLC SERPL: 51.8 % (ref 20–50)
HGB BLD-MCNC: 11.8 G/DL (ref 12–16)
IMM GRANULOCYTES # BLD AUTO: 0.02 K/UL (ref 0–0.04)
IMM GRANULOCYTES NFR BLD AUTO: 0.2 % (ref 0–0.5)
INSULIN COLLECTION INTERVAL: NORMAL
INSULIN SERPL-ACNC: 9.3 UU/ML
LDLC SERPL CALC-MCNC: 73 MG/DL (ref 63–159)
LYMPHOCYTES # BLD AUTO: 1.4 K/UL (ref 1–4.8)
LYMPHOCYTES NFR BLD: 15.2 % (ref 18–48)
MCH RBC QN AUTO: 28.5 PG (ref 27–31)
MCHC RBC AUTO-ENTMCNC: 28.9 G/DL (ref 32–36)
MCV RBC AUTO: 99 FL (ref 82–98)
MONOCYTES # BLD AUTO: 0.5 K/UL (ref 0.3–1)
MONOCYTES NFR BLD: 5.7 % (ref 4–15)
NEUTROPHILS # BLD AUTO: 7.3 K/UL (ref 1.8–7.7)
NEUTROPHILS NFR BLD: 76.8 % (ref 38–73)
NONHDLC SERPL-MCNC: 81 MG/DL
NRBC BLD-RTO: 0 /100 WBC
PLATELET # BLD AUTO: 387 K/UL (ref 150–450)
PMV BLD AUTO: 11 FL (ref 9.2–12.9)
POTASSIUM SERPL-SCNC: 4 MMOL/L (ref 3.5–5.1)
PROT SERPL-MCNC: 6.7 G/DL (ref 6–8.4)
RBC # BLD AUTO: 4.14 M/UL (ref 4–5.4)
SODIUM SERPL-SCNC: 136 MMOL/L (ref 136–145)
T4 FREE SERPL-MCNC: 0.99 NG/DL (ref 0.71–1.51)
TRIGL SERPL-MCNC: 40 MG/DL (ref 30–150)
TSH SERPL DL<=0.005 MIU/L-ACNC: 1.12 UIU/ML (ref 0.4–4)
WBC # BLD AUTO: 9.49 K/UL (ref 3.9–12.7)

## 2023-02-14 ENCOUNTER — PATIENT MESSAGE (OUTPATIENT)
Dept: PRIMARY CARE CLINIC | Facility: CLINIC | Age: 68
End: 2023-02-14
Payer: COMMERCIAL

## 2023-02-14 DIAGNOSIS — Z98.84 GASTRIC BYPASS STATUS FOR OBESITY: Primary | ICD-10-CM

## 2023-02-15 ENCOUNTER — PATIENT OUTREACH (OUTPATIENT)
Dept: ADMINISTRATIVE | Facility: HOSPITAL | Age: 68
End: 2023-02-15
Payer: COMMERCIAL

## 2023-02-16 ENCOUNTER — PATIENT MESSAGE (OUTPATIENT)
Dept: PRIMARY CARE CLINIC | Facility: CLINIC | Age: 68
End: 2023-02-16
Payer: COMMERCIAL

## 2023-02-16 NOTE — TELEPHONE ENCOUNTER
I have signed for the following orders AND/OR meds.  Please call the patient and ask the patient to schedule the testing AND/OR inform about any medications that were sent.      Orders Placed This Encounter   Procedures    Vitamin B12     Standing Status:   Future     Standing Expiration Date:   4/16/2024    Folate     Standing Status:   Future     Standing Expiration Date:   4/16/2024

## 2023-02-21 ENCOUNTER — LAB VISIT (OUTPATIENT)
Dept: LAB | Facility: HOSPITAL | Age: 68
End: 2023-02-21
Attending: FAMILY MEDICINE
Payer: MEDICARE

## 2023-02-21 DIAGNOSIS — Z98.84 GASTRIC BYPASS STATUS FOR OBESITY: ICD-10-CM

## 2023-02-21 PROCEDURE — 36415 COLL VENOUS BLD VENIPUNCTURE: CPT | Mod: PN | Performed by: FAMILY MEDICINE

## 2023-02-21 PROCEDURE — 82746 ASSAY OF FOLIC ACID SERUM: CPT | Performed by: FAMILY MEDICINE

## 2023-02-21 PROCEDURE — 82607 VITAMIN B-12: CPT | Performed by: FAMILY MEDICINE

## 2023-02-22 LAB
FOLATE SERPL-MCNC: 12.7 NG/ML (ref 4–24)
VIT B12 SERPL-MCNC: 681 PG/ML (ref 210–950)

## 2023-02-24 NOTE — PROGRESS NOTES
Kellie,     Your lab results are within recommended goals for your age and conditions. No change in therapy is needed. Please let me know if you have further questions.    Sincerely,   Ana Campos MD

## 2023-02-27 ENCOUNTER — OFFICE VISIT (OUTPATIENT)
Dept: PRIMARY CARE CLINIC | Facility: CLINIC | Age: 68
End: 2023-02-27
Payer: MEDICARE

## 2023-02-27 VITALS
BODY MASS INDEX: 49.87 KG/M2 | WEIGHT: 281.44 LBS | SYSTOLIC BLOOD PRESSURE: 136 MMHG | HEART RATE: 93 BPM | DIASTOLIC BLOOD PRESSURE: 82 MMHG | RESPIRATION RATE: 16 BRPM | HEIGHT: 63 IN | TEMPERATURE: 98 F

## 2023-02-27 DIAGNOSIS — Z01.818 PRE-OP EVALUATION: Primary | ICD-10-CM

## 2023-02-27 DIAGNOSIS — H40.1190 CHRONIC OPEN ANGLE GLAUCOMA, UNSPECIFIED GLAUCOMA STAGE, UNSPECIFIED LATERALITY: ICD-10-CM

## 2023-02-27 PROCEDURE — 99213 OFFICE O/P EST LOW 20 MIN: CPT | Mod: S$PBB,,, | Performed by: NURSE PRACTITIONER

## 2023-02-27 PROCEDURE — 99214 OFFICE O/P EST MOD 30 MIN: CPT | Mod: PBBFAC,PN | Performed by: NURSE PRACTITIONER

## 2023-02-27 PROCEDURE — 99213 PR OFFICE/OUTPT VISIT, EST, LEVL III, 20-29 MIN: ICD-10-PCS | Mod: S$PBB,,, | Performed by: NURSE PRACTITIONER

## 2023-02-27 PROCEDURE — 99999 PR PBB SHADOW E&M-EST. PATIENT-LVL IV: ICD-10-PCS | Mod: PBBFAC,,, | Performed by: NURSE PRACTITIONER

## 2023-02-27 PROCEDURE — 99999 PR PBB SHADOW E&M-EST. PATIENT-LVL IV: CPT | Mod: PBBFAC,,, | Performed by: NURSE PRACTITIONER

## 2023-02-27 NOTE — PROGRESS NOTES
Chief Complaint  Chief Complaint   Patient presents with    Pre-op Exam         HPI        Kellie Beckett is a 68 y.o. female who presents to the office today for a preoperative consultation at the request of surgeon Dr. Pool who plans on performing Left Glaucoma Surgery on March 13, 2023. This consultation is requested for the specific conditions prompting preoperative evaluation (i.e. because of potential affect on operative risk): See Problem List. Planned anesthesia: At Surgeon's Discretion. The patient has the following known anesthesia issues: None. Patients bleeding risk: no recent abnormal bleeding, no remote history of abnormal bleeding, and no use of Ca-channel blockers.     History     PAST MEDICAL HISTORY:  Past Medical History:   Diagnosis Date    Anemia     iron and b12 deficiency    Glaucoma     Hypertension        PAST SURGICAL HISTORY:  Past Surgical History:   Procedure Laterality Date    EYE SURGERY  2010 & 2013,2019    Glaucoma surgery in both eyes    GASTRIC BYPASS  01/01/2004    GLAUCOMA SURGERY      right eye       SOCIAL HISTORY:  Social History     Socioeconomic History    Marital status:     Number of children: 0   Tobacco Use    Smoking status: Never    Smokeless tobacco: Never   Substance and Sexual Activity    Alcohol use: Never    Drug use: Never    Sexual activity: Not Currently     Partners: Male     Birth control/protection: Abstinence, None   Social History Narrative    Live alone     Social Determinants of Health     Financial Resource Strain: Low Risk     Difficulty of Paying Living Expenses: Not hard at all   Food Insecurity: No Food Insecurity    Worried About Running Out of Food in the Last Year: Never true    Ran Out of Food in the Last Year: Never true   Transportation Needs: No Transportation Needs    Lack of Transportation (Medical): No    Lack of Transportation (Non-Medical): No   Physical Activity: Unknown    Days of Exercise per Week: Patient refused     Minutes of Exercise per Session: 0 min   Stress: No Stress Concern Present    Feeling of Stress : Not at all   Social Connections: Unknown    Frequency of Communication with Friends and Family: More than three times a week    Frequency of Social Gatherings with Friends and Family: More than three times a week    Active Member of Clubs or Organizations: Yes    Attends Club or Organization Meetings: More than 4 times per year    Marital Status:    Housing Stability: Unknown    Unable to Pay for Housing in the Last Year: No    Unstable Housing in the Last Year: No       FAMILY HISTORY:  Family History   Problem Relation Age of Onset    Cataracts Mother     Cancer Mother         Unknown    Early death Mother     Cataracts Father     Glaucoma Father     Diabetes Father     Heart disease Father     Hypertension Father     Cancer Father         Lung Cancer    Cancer Maternal Aunt         colon       ALLERGIES AND MEDICATIONS: updated and reviewed.  Review of patient's allergies indicates:  No Known Allergies  Current Outpatient Medications   Medication Sig Dispense Refill    acetaminophen (TYLENOL) 325 MG tablet Take 325 mg by mouth as needed for Pain.      bimatoprost (LUMIGAN OPHT) Apply 1 drop to eye once daily.      blood sugar diagnostic (BLOOD GLUCOSE TEST) Strp check sugars once daily for fluctuating blood sugars  Provide strips per patient's choice and insurance coverage 100 strip 0    cholecalciferol, vitamin D3, (VITAMIN D3) 50 mcg (2,000 unit) Cap capsule Take 2,000 Units by mouth once daily.      COMBIGAN 0.2-0.5 % Drop Place 1 drop into both eyes 2 (two) times daily.       cyanocobalamin 500 MCG tablet Take 500 mcg by mouth once daily.      ferrous gluconate 225 mg (27 mg iron) Tab 1 tablet po daily with vit C  tablet 3    hydroCHLOROthiazide (HYDRODIURIL) 25 MG tablet Take 1 tablet (25 mg total) by mouth once daily. 90 tablet 3    lidocaine-prilocaine (EMLA) cream       lisinopriL  "(PRINIVIL,ZESTRIL) 2.5 MG tablet TAKE 1 TABLET(2.5 MG) BY MOUTH EVERY DAY 90 tablet 3    LUMIGAN 0.01 % Drop Place into both eyes.      multivitamin capsule Take 1 capsule by mouth once daily.      naproxen sodium (ANAPROX) 220 MG tablet Take 220 mg by mouth as needed.      netarsudil mesylate (RHOPRESSA OPHT)       nystatin (NYSTOP) powder APPLY TO THE AFFECTED AREA THREE TIMES DAILY 60 g 2    RHOPRESSA 0.02 % ophthalmic solution Place into both eyes.      rhubarb root extract (ESTROVEN CMPLT MENOPAUSE RLF ORAL) Take by mouth.      rosuvastatin (CRESTOR) 10 MG tablet Take 1 tablet (10 mg total) by mouth once daily. 90 tablet 3    TURMERIC-TURMERIC ROOT EXTRACT ORAL        No current facility-administered medications for this visit.           Exam     ROS  Review of Systems   Constitutional:  Negative for appetite change, chills, fatigue and fever.   HENT:  Negative for congestion, ear pain, postnasal drip, rhinorrhea, sinus pressure, sneezing and sore throat.    Eyes:  Positive for pain and visual disturbance.   Respiratory:  Negative for shortness of breath.    Cardiovascular:  Negative for chest pain and palpitations.   Gastrointestinal:  Negative for abdominal pain, constipation, diarrhea, nausea and vomiting.   Genitourinary:  Negative for dysuria.   Musculoskeletal:  Negative for arthralgias.   Neurological:  Negative for headaches.         Physical Exam  Vitals:    02/27/23 1038   BP: 136/82   Pulse: 93   Resp: 16   Temp: 97.9 °F (36.6 °C)   Weight: 127.7 kg (281 lb 6.7 oz)   Height: 5' 3" (1.6 m)    Body mass index is 49.85 kg/m².  Weight: 127.7 kg (281 lb 6.7 oz)   Height: 5' 3" (160 cm)   Physical Exam  Constitutional:       General: She is not in acute distress.     Appearance: Normal appearance. She is obese.   HENT:      Head: Normocephalic and atraumatic.      Right Ear: External ear normal.      Left Ear: External ear normal.      Nose: Nose normal.   Eyes:      Pupils: Pupils are equal, round, and " reactive to light.   Cardiovascular:      Rate and Rhythm: Normal rate and regular rhythm.      Pulses: Normal pulses.   Pulmonary:      Effort: Pulmonary effort is normal. No respiratory distress.      Breath sounds: Normal breath sounds. No wheezing.   Abdominal:      General: Bowel sounds are normal.      Palpations: Abdomen is soft.   Musculoskeletal:      Cervical back: Neck supple.   Lymphadenopathy:      Cervical: No cervical adenopathy.   Skin:     General: Skin is warm and dry.   Neurological:      Mental Status: She is alert and oriented to person, place, and time.   Psychiatric:         Mood and Affect: Mood normal.           Health Maintenance         Date Due Completion Date    Pap Smear 05/27/2023 5/27/2021    DEXA Scan 06/09/2023 6/9/2020    Mammogram 06/13/2023 6/13/2022    Override on 8/15/2012: Done    Lipid Panel 02/10/2024 2/10/2023    Hemoglobin A1c (Diabetic Prevention Screening) 02/10/2026 2/10/2023    TETANUS VACCINE 06/05/2028 6/5/2018    Colorectal Cancer Screening 02/07/2033 2/7/2023              Assessment & Plan     Assessment & Plan  Problem List Items Addressed This Visit          Ophtho    COAG (chronic open-angle glaucoma)  -Planned surgery as below     Other Visit Diagnoses       Pre-op evaluation    -  Primary   1. CBC and CMP within acceptable limits 02/10/2023  2. Patient advised to follow-up with Pre-Op team regardig medications to STOP/HOLD/START prior to surgery  3. Patient medically appropriate to proceed with anticipated surgery                Health Maintenance reviewed: Deferred per patient    Follow-up: RTC as needed     30+ minutes of total time spent on the encounter, which includes face to face time and non-face to face time preparing to see the patient (eg, review of tests), Obtaining and/or reviewing separately obtained history, documenting clinical information in the electronic or other health record, independently interpreting results (not separately reported) and  communicating results to the patient/family/caregiver, or Care coordination (not separately reported).

## 2023-04-03 ENCOUNTER — TELEPHONE (OUTPATIENT)
Dept: PRIMARY CARE CLINIC | Facility: CLINIC | Age: 68
End: 2023-04-03
Payer: COMMERCIAL

## 2023-04-03 NOTE — TELEPHONE ENCOUNTER
Called patient back in reference to her knee . [facility]

## 2023-04-10 ENCOUNTER — TELEPHONE (OUTPATIENT)
Dept: PRIMARY CARE CLINIC | Facility: CLINIC | Age: 68
End: 2023-04-10
Payer: COMMERCIAL

## 2023-04-10 NOTE — TELEPHONE ENCOUNTER
----- Message from Marylu Martino sent at 4/10/2023  9:00 AM CDT -----  Contact: [phone]  Type:  Sooner Apoointment Request    Caller is requesting a sooner appointment.  Caller declined first available appointment listed below.  Caller will not accept being placed on the waitlist and is requesting a message be sent to doctor.  Name of Caller: Kellie nagel  When is the first available appointment? 04/17  Symptoms: Patient is needing stiches removed   Would the patient rather a call back or a response via [facility]? Call  Best Call Back Number: [phone]  Additional Information:

## 2023-04-10 NOTE — TELEPHONE ENCOUNTER
Called patient and told her she can see the urgent care for stitches removal since the provider is not in office this week, patient was upset because someone told her she can book a same day appointment. I told her sorry for the inconvenience.

## 2023-04-28 ENCOUNTER — PATIENT MESSAGE (OUTPATIENT)
Dept: PRIMARY CARE CLINIC | Facility: CLINIC | Age: 68
End: 2023-04-28
Payer: COMMERCIAL

## 2023-04-28 DIAGNOSIS — Z12.31 ENCOUNTER FOR SCREENING MAMMOGRAM FOR BREAST CANCER: Primary | ICD-10-CM

## 2023-05-01 NOTE — TELEPHONE ENCOUNTER
I have signed for the following orders AND/OR meds.  Please call the patient and ask the patient to schedule the testing AND/OR inform about any medications that were sent.      Orders Placed This Encounter   Procedures    Mammo Digital Screening Bilat w/ Fabián     Standing Status:   Future     Standing Expiration Date:   7/1/2024     Order Specific Question:   May the Radiologist modify the order per protocol to meet the clinical needs of the patient?     Answer:   Yes            [unfilled]

## 2023-05-02 ENCOUNTER — PATIENT MESSAGE (OUTPATIENT)
Dept: PRIMARY CARE CLINIC | Facility: CLINIC | Age: 68
End: 2023-05-02
Payer: COMMERCIAL

## 2023-05-03 ENCOUNTER — OFFICE VISIT (OUTPATIENT)
Dept: PRIMARY CARE CLINIC | Facility: CLINIC | Age: 68
End: 2023-05-03
Payer: MEDICARE

## 2023-05-03 ENCOUNTER — LAB VISIT (OUTPATIENT)
Dept: LAB | Facility: HOSPITAL | Age: 68
End: 2023-05-03
Attending: NURSE PRACTITIONER
Payer: MEDICARE

## 2023-05-03 VITALS
HEIGHT: 63 IN | OXYGEN SATURATION: 99 % | DIASTOLIC BLOOD PRESSURE: 88 MMHG | HEART RATE: 83 BPM | WEIGHT: 282.63 LBS | SYSTOLIC BLOOD PRESSURE: 134 MMHG | TEMPERATURE: 98 F | BODY MASS INDEX: 50.08 KG/M2

## 2023-05-03 DIAGNOSIS — M17.0 PRIMARY OSTEOARTHRITIS OF BOTH KNEES: ICD-10-CM

## 2023-05-03 DIAGNOSIS — G89.29 CHRONIC RIGHT-SIDED LOW BACK PAIN WITH RIGHT-SIDED SCIATICA: ICD-10-CM

## 2023-05-03 DIAGNOSIS — T14.8XXA BRUISING: ICD-10-CM

## 2023-05-03 DIAGNOSIS — R23.3 PETECHIAL ERUPTION: Primary | ICD-10-CM

## 2023-05-03 DIAGNOSIS — R23.3 PETECHIAL ERUPTION: ICD-10-CM

## 2023-05-03 DIAGNOSIS — D64.9 ANEMIA, UNSPECIFIED TYPE: ICD-10-CM

## 2023-05-03 DIAGNOSIS — M54.41 CHRONIC RIGHT-SIDED LOW BACK PAIN WITH RIGHT-SIDED SCIATICA: ICD-10-CM

## 2023-05-03 DIAGNOSIS — E66.01 CLASS 3 SEVERE OBESITY DUE TO EXCESS CALORIES WITH BODY MASS INDEX (BMI) OF 50.0 TO 59.9 IN ADULT, UNSPECIFIED WHETHER SERIOUS COMORBIDITY PRESENT: ICD-10-CM

## 2023-05-03 LAB
APTT PPP: 28 SEC (ref 21–32)
INR PPP: 1.1 (ref 0.8–1.2)
PROTHROMBIN TIME: 11.4 SEC (ref 9–12.5)

## 2023-05-03 PROCEDURE — 99999 PR PBB SHADOW E&M-EST. PATIENT-LVL V: CPT | Mod: PBBFAC,,, | Performed by: NURSE PRACTITIONER

## 2023-05-03 PROCEDURE — 99214 PR OFFICE/OUTPT VISIT, EST, LEVL IV, 30-39 MIN: ICD-10-PCS | Mod: S$PBB,,, | Performed by: NURSE PRACTITIONER

## 2023-05-03 PROCEDURE — 85025 COMPLETE CBC W/AUTO DIFF WBC: CPT | Performed by: NURSE PRACTITIONER

## 2023-05-03 PROCEDURE — 85730 THROMBOPLASTIN TIME PARTIAL: CPT | Performed by: NURSE PRACTITIONER

## 2023-05-03 PROCEDURE — 99999 PR PBB SHADOW E&M-EST. PATIENT-LVL V: ICD-10-PCS | Mod: PBBFAC,,, | Performed by: NURSE PRACTITIONER

## 2023-05-03 PROCEDURE — 80076 HEPATIC FUNCTION PANEL: CPT | Performed by: NURSE PRACTITIONER

## 2023-05-03 PROCEDURE — 85610 PROTHROMBIN TIME: CPT | Performed by: NURSE PRACTITIONER

## 2023-05-03 PROCEDURE — 99214 OFFICE O/P EST MOD 30 MIN: CPT | Mod: S$PBB,,, | Performed by: NURSE PRACTITIONER

## 2023-05-03 PROCEDURE — 99215 OFFICE O/P EST HI 40 MIN: CPT | Mod: PBBFAC,PN | Performed by: NURSE PRACTITIONER

## 2023-05-03 PROCEDURE — 36415 COLL VENOUS BLD VENIPUNCTURE: CPT | Mod: PN | Performed by: NURSE PRACTITIONER

## 2023-05-03 RX ORDER — BRINZOLAMIDE 10 MG/ML
SUSPENSION/ DROPS OPHTHALMIC
COMMUNITY
Start: 2023-02-24

## 2023-05-03 NOTE — PROGRESS NOTES
Chief Complaint  Chief Complaint   Patient presents with    Bleeding/Bruising         HPI     HPI  ManjuAnitha Beckett is a 68 y.o. female with medical diagnoses as listed in the medical history and problem list that presents for Petechial Eruption.  Pt is known to me with her last appointment 2/27/2023.      Petechial Eruption: Noticed approx 3 months ago. Had been taking Tylenol and Aleve for bilateral prior knee injection that she received in January of 2023. No long term blood thinners on board. Does not take any OTC ASA or ASA containing OTC. Reports a fall at the end of march that resulted in a wound at left  knee with subsequent bruising at BLE that is currently visible. Wound is healed and sutures have been removed.     Pertinent negatives are chest pain/palpitations/tightness/discomfort, SOB, GI upset, urinary/bowel changes, unexplained weight loss/gain, dizziness/headaches/syncope.       History     PAST MEDICAL HISTORY:  Past Medical History:   Diagnosis Date    Anemia     iron and b12 deficiency    Glaucoma     Hypertension        PAST SURGICAL HISTORY:  Past Surgical History:   Procedure Laterality Date    EYE SURGERY  2010 & 2013,2019    Glaucoma surgery in both eyes    GASTRIC BYPASS  01/01/2004    GLAUCOMA SURGERY      right eye       SOCIAL HISTORY:  Social History     Socioeconomic History    Marital status:     Number of children: 0   Tobacco Use    Smoking status: Never    Smokeless tobacco: Never   Substance and Sexual Activity    Alcohol use: Never    Drug use: Never    Sexual activity: Not Currently     Partners: Male     Birth control/protection: Abstinence, None   Social History Narrative    Live alone     Social Determinants of Health     Financial Resource Strain: Unknown    Difficulty of Paying Living Expenses: Patient refused   Food Insecurity: Unknown    Worried About Running Out of Food in the Last Year: Patient refused    Ran Out of Food in the Last Year: Patient refused    Transportation Needs: Unknown    Lack of Transportation (Medical): Patient refused    Lack of Transportation (Non-Medical): Patient refused   Physical Activity: Unknown    Days of Exercise per Week: Patient refused    Minutes of Exercise per Session: 0 min   Stress: Unknown    Feeling of Stress : Patient refused   Social Connections: Unknown    Frequency of Communication with Friends and Family: Patient refused    Frequency of Social Gatherings with Friends and Family: Patient refused    Active Member of Clubs or Organizations: Patient refused    Attends Club or Organization Meetings: Patient refused    Marital Status: Patient refused   Housing Stability: Unknown    Unable to Pay for Housing in the Last Year: Patient refused    Unstable Housing in the Last Year: Patient refused       FAMILY HISTORY:  Family History   Problem Relation Age of Onset    Cataracts Mother     Cancer Mother         Unknown    Early death Mother     Cataracts Father     Glaucoma Father     Diabetes Father     Heart disease Father     Hypertension Father     Cancer Father         Lung Cancer    Cancer Maternal Aunt         colon       ALLERGIES AND MEDICATIONS: updated and reviewed.  Review of patient's allergies indicates:  No Known Allergies  Current Outpatient Medications   Medication Sig Dispense Refill    acetaminophen (TYLENOL) 325 MG tablet Take 325 mg by mouth as needed for Pain.      bimatoprost (LUMIGAN OPHT) Apply 1 drop to eye once daily.      blood sugar diagnostic (BLOOD GLUCOSE TEST) Strp check sugars once daily for fluctuating blood sugars  Provide strips per patient's choice and insurance coverage 100 strip 0    brinzolamide (AZOPT) 1 % ophthalmic suspension Place into both eyes.      cholecalciferol, vitamin D3, (VITAMIN D3) 50 mcg (2,000 unit) Cap capsule Take 2,000 Units by mouth once daily.      COMBIGAN 0.2-0.5 % Drop Place 1 drop into both eyes 2 (two) times daily.       cyanocobalamin 500 MCG tablet Take 500 mcg by  mouth once daily.      ferrous gluconate 225 mg (27 mg iron) Tab 1 tablet po daily with vit C  tablet 3    hydroCHLOROthiazide (HYDRODIURIL) 25 MG tablet Take 1 tablet (25 mg total) by mouth once daily. 90 tablet 3    lisinopriL (PRINIVIL,ZESTRIL) 2.5 MG tablet TAKE 1 TABLET(2.5 MG) BY MOUTH EVERY DAY 90 tablet 3    LUMIGAN 0.01 % Drop Place into both eyes.      multivitamin capsule Take 1 capsule by mouth once daily.      naproxen sodium (ANAPROX) 220 MG tablet Take 220 mg by mouth as needed.      netarsudil mesylate (RHOPRESSA OPHT)       nystatin (NYSTOP) powder APPLY TO THE AFFECTED AREA THREE TIMES DAILY 60 g 2    RHOPRESSA 0.02 % ophthalmic solution Place into both eyes.      rhubarb root extract (ESTROVEN CMPLT MENOPAUSE RLF ORAL) Take by mouth.      rosuvastatin (CRESTOR) 10 MG tablet Take 1 tablet (10 mg total) by mouth once daily. 90 tablet 3    TURMERIC-TURMERIC ROOT EXTRACT ORAL       lidocaine-prilocaine (EMLA) cream        No current facility-administered medications for this visit.           Exam     ROS  Review of Systems   Constitutional:  Negative for activity change and unexpected weight change.   HENT:  Negative for hearing loss, rhinorrhea and trouble swallowing.    Eyes:  Negative for discharge and visual disturbance.   Respiratory:  Negative for chest tightness and wheezing.    Cardiovascular:  Negative for chest pain and palpitations.   Gastrointestinal:  Negative for blood in stool, constipation, diarrhea and vomiting.   Endocrine: Negative for polydipsia and polyuria.   Genitourinary:  Negative for difficulty urinating, dysuria, hematuria and menstrual problem.   Musculoskeletal:  Positive for arthralgias. Negative for joint swelling and neck pain.   Neurological:  Positive for weakness. Negative for headaches.   Psychiatric/Behavioral:  Negative for confusion and dysphoric mood.          Physical Exam  Vitals:    05/03/23 0753   BP: 134/88   Pulse: 83   Temp: 98.2 °F (36.8 °C)  "  TempSrc: Oral   SpO2: 99%   Weight: 128.2 kg (282 lb 10.1 oz)   Height: 5' 3" (1.6 m)    Body mass index is 50.07 kg/m².  Weight: 128.2 kg (282 lb 10.1 oz)   Height: 5' 3" (160 cm)   Physical Exam  Constitutional:       General: She is not in acute distress.  HENT:      Head: Normocephalic and atraumatic.      Right Ear: External ear normal.      Left Ear: External ear normal.      Nose: Nose normal.   Eyes:      Pupils: Pupils are equal, round, and reactive to light.   Cardiovascular:      Rate and Rhythm: Normal rate and regular rhythm.   Pulmonary:      Effort: Pulmonary effort is normal. No respiratory distress.      Breath sounds: Normal breath sounds. No wheezing.   Abdominal:      General: Bowel sounds are normal.      Palpations: Abdomen is soft.   Musculoskeletal:      Cervical back: Neck supple.   Lymphadenopathy:      Cervical: No cervical adenopathy.   Skin:     General: Skin is warm and dry.      Findings: Bruising and petechiae present.          Neurological:      Mental Status: She is alert and oriented to person, place, and time.             Health Maintenance         Date Due Completion Date    Pap Smear 05/27/2023 5/27/2021    Mammogram 06/13/2023 6/13/2022    Override on 8/15/2012: Done    DEXA Scan 06/09/2023 6/9/2020    Lipid Panel 02/10/2024 2/10/2023    Hemoglobin A1c (Diabetic Prevention Screening) 02/10/2026 2/10/2023    TETANUS VACCINE 06/05/2028 6/5/2018    Colorectal Cancer Screening 02/07/2033 2/7/2023              Assessment & Plan     Assessment & Plan  Problem List Items Addressed This Visit          Oncology    Anemia    Overview     iron and b12 deficiency  Pt reports on b12 since gastric bypass, but no h/o actual low b12 reading           Relevant Orders    CBC Auto Differential (Completed)       Orthopedic    Chronic right-sided low back pain with right-sided sciatica    Primary osteoarthritis of both knees     Other Visit Diagnoses       Petechial eruption    -  Primary  - Stop " NSAID and ASA medications    Relevant Orders    CBC Auto Differential (Completed)    Protime-INR (Completed)    Hepatic Function Panel (Completed)    APTT (Completed)    Bruising        Relevant Orders    CBC Auto Differential (Completed)    Protime-INR (Completed)    Hepatic Function Panel (Completed)    APTT (Completed)    Class 3 severe obesity due to excess calories with body mass index (BMI) of 50.0 to 59.9 in adult, unspecified whether serious comorbidity present                  Health Maintenance reviewed: Deferred per patient    Follow-up: If symptoms worsen or fail to improve    30+ minutes of total time spent on the encounter, which includes face to face time and non-face to face time preparing to see the patient (eg, review of tests), Obtaining and/or reviewing separately obtained history, documenting clinical information in the electronic or other health record, independently interpreting results (not separately reported) and communicating results to the patient/family/caregiver, or Care coordination (not separately reported).

## 2023-05-04 LAB
ALBUMIN SERPL BCP-MCNC: 3.5 G/DL (ref 3.5–5.2)
ALP SERPL-CCNC: 95 U/L (ref 55–135)
ALT SERPL W/O P-5'-P-CCNC: 16 U/L (ref 10–44)
AST SERPL-CCNC: 18 U/L (ref 10–40)
BASOPHILS # BLD AUTO: 0.06 K/UL (ref 0–0.2)
BASOPHILS NFR BLD: 0.8 % (ref 0–1.9)
BILIRUB DIRECT SERPL-MCNC: 0.3 MG/DL (ref 0.1–0.3)
BILIRUB SERPL-MCNC: 0.6 MG/DL (ref 0.1–1)
DIFFERENTIAL METHOD: ABNORMAL
EOSINOPHIL # BLD AUTO: 0.1 K/UL (ref 0–0.5)
EOSINOPHIL NFR BLD: 1.7 % (ref 0–8)
ERYTHROCYTE [DISTWIDTH] IN BLOOD BY AUTOMATED COUNT: 12.6 % (ref 11.5–14.5)
HCT VFR BLD AUTO: 37.8 % (ref 37–48.5)
HGB BLD-MCNC: 11.3 G/DL (ref 12–16)
IMM GRANULOCYTES # BLD AUTO: 0.02 K/UL (ref 0–0.04)
IMM GRANULOCYTES NFR BLD AUTO: 0.3 % (ref 0–0.5)
LYMPHOCYTES # BLD AUTO: 1.2 K/UL (ref 1–4.8)
LYMPHOCYTES NFR BLD: 15.6 % (ref 18–48)
MCH RBC QN AUTO: 28.8 PG (ref 27–31)
MCHC RBC AUTO-ENTMCNC: 29.9 G/DL (ref 32–36)
MCV RBC AUTO: 96 FL (ref 82–98)
MONOCYTES # BLD AUTO: 0.5 K/UL (ref 0.3–1)
MONOCYTES NFR BLD: 6.6 % (ref 4–15)
NEUTROPHILS # BLD AUTO: 5.8 K/UL (ref 1.8–7.7)
NEUTROPHILS NFR BLD: 75 % (ref 38–73)
NRBC BLD-RTO: 0 /100 WBC
PLATELET # BLD AUTO: 406 K/UL (ref 150–450)
PMV BLD AUTO: 10.5 FL (ref 9.2–12.9)
PROT SERPL-MCNC: 7 G/DL (ref 6–8.4)
RBC # BLD AUTO: 3.92 M/UL (ref 4–5.4)
WBC # BLD AUTO: 7.75 K/UL (ref 3.9–12.7)

## 2023-05-05 ENCOUNTER — TELEPHONE (OUTPATIENT)
Dept: PRIMARY CARE CLINIC | Facility: CLINIC | Age: 68
End: 2023-05-05
Payer: COMMERCIAL

## 2023-05-05 NOTE — TELEPHONE ENCOUNTER
We discussed lab results concerning clotting factors given petechial eruptions  We discussed the effect steroids have on the immune system  All questions answered     Jesus Méndez NP

## 2023-06-23 ENCOUNTER — HOSPITAL ENCOUNTER (OUTPATIENT)
Dept: RADIOLOGY | Facility: HOSPITAL | Age: 68
Discharge: HOME | End: 2023-06-23
Attending: FAMILY MEDICINE
Payer: MEDICARE

## 2023-06-23 VITALS — WEIGHT: 283.94 LBS | HEIGHT: 63 IN | BODY MASS INDEX: 50.31 KG/M2

## 2023-06-23 DIAGNOSIS — Z12.31 ENCOUNTER FOR SCREENING MAMMOGRAM FOR BREAST CANCER: ICD-10-CM

## 2023-06-23 PROCEDURE — 77063 BREAST TOMOSYNTHESIS BI: CPT | Mod: 26,,, | Performed by: RADIOLOGY

## 2023-06-23 PROCEDURE — 77067 SCR MAMMO BI INCL CAD: CPT | Mod: 26,,, | Performed by: RADIOLOGY

## 2023-06-23 PROCEDURE — 77067 MAMMO DIGITAL SCREENING BILAT WITH TOMO: ICD-10-PCS | Mod: 26,,, | Performed by: RADIOLOGY

## 2023-06-23 PROCEDURE — 77067 SCR MAMMO BI INCL CAD: CPT | Mod: TC

## 2023-06-23 PROCEDURE — 77063 MAMMO DIGITAL SCREENING BILAT WITH TOMO: ICD-10-PCS | Mod: 26,,, | Performed by: RADIOLOGY

## 2023-07-06 ENCOUNTER — OFFICE VISIT (OUTPATIENT)
Dept: OBSTETRICS AND GYNECOLOGY | Facility: CLINIC | Age: 68
End: 2023-07-06
Payer: MEDICARE

## 2023-07-06 VITALS
SYSTOLIC BLOOD PRESSURE: 162 MMHG | DIASTOLIC BLOOD PRESSURE: 88 MMHG | WEIGHT: 276.13 LBS | BODY MASS INDEX: 48.93 KG/M2 | HEIGHT: 63 IN

## 2023-07-06 DIAGNOSIS — Z78.0 POSTMENOPAUSAL: ICD-10-CM

## 2023-07-06 DIAGNOSIS — Z01.419 ENCOUNTER FOR GYNECOLOGICAL EXAMINATION WITHOUT ABNORMAL FINDING: Primary | ICD-10-CM

## 2023-07-06 PROCEDURE — G0101 PR CA SCREEN;PELVIC/BREAST EXAM: ICD-10-PCS | Mod: GZ,S$PBB,, | Performed by: NURSE PRACTITIONER

## 2023-07-06 PROCEDURE — G0101 CA SCREEN;PELVIC/BREAST EXAM: HCPCS | Mod: GZ,S$PBB,, | Performed by: NURSE PRACTITIONER

## 2023-07-06 PROCEDURE — 99999 PR PBB SHADOW E&M-EST. PATIENT-LVL IV: ICD-10-PCS | Mod: PBBFAC,,, | Performed by: NURSE PRACTITIONER

## 2023-07-06 PROCEDURE — 88175 CYTOPATH C/V AUTO FLUID REDO: CPT | Performed by: NURSE PRACTITIONER

## 2023-07-06 PROCEDURE — 99999 PR PBB SHADOW E&M-EST. PATIENT-LVL IV: CPT | Mod: PBBFAC,,, | Performed by: NURSE PRACTITIONER

## 2023-07-06 PROCEDURE — 99214 OFFICE O/P EST MOD 30 MIN: CPT | Mod: PBBFAC,PN | Performed by: NURSE PRACTITIONER

## 2023-07-06 PROCEDURE — 87624 HPV HI-RISK TYP POOLED RSLT: CPT | Performed by: NURSE PRACTITIONER

## 2023-07-06 NOTE — PROGRESS NOTES
CC: Well woman exam    Kellie Beckett is a 68 y.o. female  presents for well woman exam.  LMP: No LMP recorded. Patient is postmenopausal..    Last pap  - normal   Pt request pap at her 2 yr visit - even with education of pap smear guidelines that she not longer needs pap   Mmg 23 - normal     Past Medical History:   Diagnosis Date    Anemia     iron and b12 deficiency    Glaucoma     Hypertension      Past Surgical History:   Procedure Laterality Date    EYE SURGERY   & ,2019    Glaucoma surgery in both eyes    GASTRIC BYPASS  2004    GLAUCOMA SURGERY      right eye     Social History     Socioeconomic History    Marital status:     Number of children: 0   Tobacco Use    Smoking status: Never    Smokeless tobacco: Never   Substance and Sexual Activity    Alcohol use: Never    Drug use: Never    Sexual activity: Not Currently     Partners: Male     Birth control/protection: Abstinence, None   Social History Narrative    Live alone     Social Determinants of Health     Financial Resource Strain: Unknown    Difficulty of Paying Living Expenses: Patient refused   Food Insecurity: Unknown    Worried About Running Out of Food in the Last Year: Patient refused    Ran Out of Food in the Last Year: Patient refused   Transportation Needs: Unknown    Lack of Transportation (Medical): Patient refused    Lack of Transportation (Non-Medical): Patient refused   Physical Activity: Unknown    Days of Exercise per Week: Patient refused    Minutes of Exercise per Session: 0 min   Stress: Unknown    Feeling of Stress : Patient refused   Social Connections: Unknown    Frequency of Communication with Friends and Family: Patient refused    Frequency of Social Gatherings with Friends and Family: Patient refused    Active Member of Clubs or Organizations: Patient refused    Attends Club or Organization Meetings: Patient refused    Marital Status: Patient refused   Housing Stability: Unknown    Unable  "to Pay for Housing in the Last Year: Patient refused    Unstable Housing in the Last Year: Patient refused     Family History   Problem Relation Age of Onset    Cataracts Mother     Cancer Mother         Unknown    Early death Mother     Cataracts Father     Glaucoma Father     Diabetes Father     Heart disease Father     Hypertension Father     Cancer Father         Lung Cancer    Cancer Maternal Aunt         colon     OB History          1    Para        Term                AB   1    Living             SAB   1    IAB        Ectopic        Multiple        Live Births                     BP (!) 162/88 (BP Location: Left arm, Patient Position: Sitting, BP Method: Large (Manual))   Ht 5' 3" (1.6 m)   Wt 125.3 kg (276 lb 2 oz)   BMI 48.91 kg/m²       ROS:  GENERAL: Denies weight gain or weight loss. Feeling well overall.   SKIN: Denies rash or lesions.   HEAD: Denies head injury or headache.   NODES: Denies enlarged lymph nodes.   CHEST: Denies chest pain or shortness of breath.   CARDIOVASCULAR: Denies palpitations or left sided chest pain.   ABDOMEN: No abdominal pain, constipation, diarrhea, nausea, vomiting or rectal bleeding.   URINARY: No frequency, dysuria, hematuria, or burning on urination.  REPRODUCTIVE: See HPI.   BREASTS: The patient performs breast self-examination and denies pain, lumps, or nipple discharge.   HEMATOLOGIC: No easy bruisability or excessive bleeding.   MUSCULOSKELETAL: Denies joint pain or swelling.   NEUROLOGIC: Denies syncope or weakness.   PSYCHIATRIC: Denies depression, anxiety or mood swings.    PHYSICAL EXAM:  APPEARANCE: Well nourished, well developed, in no acute distress.  AFFECT: WNL, alert and oriented x 3  SKIN: No acne or hirsutism  NECK: Neck symmetric without masses or thyromegaly  NODES: No inguinal, cervical, axillary, or femoral lymph node enlargement  CHEST: Good respiratory effect  ABDOMEN: Soft.  No tenderness or masses.  No hepatosplenomegaly.  No " hernias.  BREASTS: Symmetrical, no skin changes or visible lesions.  No palpable masses, nipple discharge bilaterally.  PELVIC: Normal external genitalia without lesions.  Normal hair distribution.  Adequate perineal body, normal urethral meatus.  Vagina atrophicwithout lesions or discharge. - cervix deep in canal and difficult pap but  Cervix pink, without lesions, discharge or tenderness.  No significant cystocele or rectocele.  Bimanual exam shows uterus to be normal size, regular, mobile and nontender.  Adnexa without masses or tenderness.    EXTREMITIES: No edema.  Physical Exam    1. Encounter for gynecological examination without abnormal finding  Liquid-Based Pap Smear, Screening    HPV High Risk Genotypes, PCR      2. Postmenopausal         AND PLAN:    Kellie was seen today for well woman.    Diagnoses and all orders for this visit:    Encounter for gynecological examination without abnormal finding  -     Liquid-Based Pap Smear, Screening  -     HPV High Risk Genotypes, PCR    Postmenopausal         Patient was counseled today on A.C.S. Pap guidelines and recommendations for yearly pelvic exams, mammograms and monthly self breast exams; to see her PCP for other health maintenance.

## 2023-07-11 LAB
FINAL PATHOLOGIC DIAGNOSIS: NORMAL
HPV HR 12 DNA SPEC QL NAA+PROBE: NEGATIVE
HPV16 AG SPEC QL: NEGATIVE
HPV18 DNA SPEC QL NAA+PROBE: NEGATIVE
Lab: NORMAL

## 2023-08-08 DIAGNOSIS — I10 ESSENTIAL HYPERTENSION: ICD-10-CM

## 2023-08-08 NOTE — TELEPHONE ENCOUNTER
No care due was identified.  [facility] Embedded Care Due Messages. Reference number: [id number].   8/08/2023 6:22:13 PM CDT

## 2023-08-09 RX ORDER — HYDROCHLOROTHIAZIDE 25 MG/1
25 TABLET ORAL
Qty: 90 TABLET | Refills: 1 | Status: SHIPPED | OUTPATIENT
Start: 2023-08-09

## 2023-08-09 NOTE — TELEPHONE ENCOUNTER
Refill Routing Note   Medication(s) are not appropriate for processing by [facility] for the following reason(s):      Required vitals abnormal    [facility] action(s):  Defer Care Due:  None identified            Appointments  past 12m or future 3m with PCP    Date Provider   Last Visit   2/9/2023 Ana Campos MD   Next Visit   Visit date not found Ana Campos MD   ED visits in past 90 days: 0        Note composed:9:13 PM 08/08/2023

## 2023-08-17 DIAGNOSIS — I10 ESSENTIAL HYPERTENSION: ICD-10-CM

## 2023-08-17 RX ORDER — LISINOPRIL 2.5 MG/1
2.5 TABLET ORAL DAILY
Qty: 90 TABLET | Refills: 3 | OUTPATIENT
Start: 2023-08-17

## 2023-08-17 NOTE — TELEPHONE ENCOUNTER
No care due was identified.  [facility] Embedded Care Due Messages. Reference number: [id number].   8/17/2023 9:00:37 AM CDT

## 2023-08-17 NOTE — TELEPHONE ENCOUNTER
----- Message from Taliadidier Brito sent at 8/17/2023  8:37 AM CDT -----  Type:  RX Refill Request    Who Called: pt  Refill or New Rx:Refill  RX Name and Strength:lisinopril 2.5 mg  How is the patient currently taking it? (ex. 1XDay):1XDay  Is this a 30 day or 90 day RX:90  Preferred Pharmacy with phone number: .  [id number] - [facility] [address] AT [facility]  [address] [address]  Phone: [phone] Fax: [phone]  Local or Mail Order:local  Ordering Provider:Dr Campos  Would the patient rather a call back or a response via [facility]? call  Best Call Back Number:[phone]   Additional Information: States the Pharmacy has sent a request and they have not got a response back multiply times. States its been over a week.    Thank you

## 2023-08-26 ENCOUNTER — PATIENT MESSAGE (OUTPATIENT)
Dept: ADMINISTRATIVE | Facility: HOSPITAL | Age: 68
End: 2023-08-26
Payer: COMMERCIAL

## 2023-08-26 ENCOUNTER — PATIENT MESSAGE (OUTPATIENT)
Dept: ADMINISTRATIVE | Facility: OTHER | Age: 68
End: 2023-08-26
Payer: COMMERCIAL

## 2023-08-29 ENCOUNTER — PATIENT OUTREACH (OUTPATIENT)
Dept: ADMINISTRATIVE | Facility: HOSPITAL | Age: 68
End: 2023-08-29
Payer: COMMERCIAL

## 2023-08-29 DIAGNOSIS — M81.0 OSTEOPOROSIS, UNSPECIFIED OSTEOPOROSIS TYPE, UNSPECIFIED PATHOLOGICAL FRACTURE PRESENCE: Primary | ICD-10-CM

## 2023-08-29 DIAGNOSIS — M89.9 BONE DISORDER: ICD-10-CM

## 2023-09-12 ENCOUNTER — APPOINTMENT (OUTPATIENT)
Dept: RADIOLOGY | Facility: HOSPITAL | Age: 68
End: 2023-09-12
Attending: FAMILY MEDICINE
Payer: MEDICARE

## 2023-09-12 DIAGNOSIS — M81.0 OSTEOPOROSIS, UNSPECIFIED OSTEOPOROSIS TYPE, UNSPECIFIED PATHOLOGICAL FRACTURE PRESENCE: ICD-10-CM

## 2023-09-12 DIAGNOSIS — M89.9 BONE DISORDER: ICD-10-CM

## 2023-09-12 PROCEDURE — 77080 DXA BONE DENSITY AXIAL: CPT | Mod: 26,,, | Performed by: RADIOLOGY

## 2023-09-12 PROCEDURE — 77080 DXA BONE DENSITY AXIAL: CPT | Mod: TC

## 2023-09-12 PROCEDURE — 77080 DXA BONE DENSITY AXIAL SKELETON 1 OR MORE SITES: ICD-10-PCS | Mod: 26,,, | Performed by: RADIOLOGY

## 2023-09-18 ENCOUNTER — PATIENT MESSAGE (OUTPATIENT)
Dept: PRIMARY CARE CLINIC | Facility: CLINIC | Age: 68
End: 2023-09-18
Payer: COMMERCIAL

## 2024-01-11 DIAGNOSIS — Z00.00 ENCOUNTER FOR MEDICARE ANNUAL WELLNESS EXAM: ICD-10-CM

## 2024-02-12 ENCOUNTER — PATIENT MESSAGE (OUTPATIENT)
Dept: PRIMARY CARE CLINIC | Facility: CLINIC | Age: 69
End: 2024-02-12
Payer: MEDICARE

## 2024-05-09 ENCOUNTER — OFFICE VISIT (OUTPATIENT)
Dept: OBSTETRICS AND GYNECOLOGY | Facility: CLINIC | Age: 69
End: 2024-05-09
Payer: MEDICARE

## 2024-05-09 VITALS
SYSTOLIC BLOOD PRESSURE: 138 MMHG | DIASTOLIC BLOOD PRESSURE: 82 MMHG | WEIGHT: 270.06 LBS | BODY MASS INDEX: 47.85 KG/M2 | HEIGHT: 63 IN

## 2024-05-09 DIAGNOSIS — N63.12 MASS OF UPPER INNER QUADRANT OF RIGHT BREAST: Primary | ICD-10-CM

## 2024-05-09 PROCEDURE — 99215 OFFICE O/P EST HI 40 MIN: CPT | Mod: PBBFAC,PN | Performed by: NURSE PRACTITIONER

## 2024-05-09 PROCEDURE — 99999 PR PBB SHADOW E&M-EST. PATIENT-LVL V: CPT | Mod: PBBFAC,,, | Performed by: NURSE PRACTITIONER

## 2024-05-09 PROCEDURE — 99213 OFFICE O/P EST LOW 20 MIN: CPT | Mod: S$PBB,,, | Performed by: NURSE PRACTITIONER

## 2024-05-09 RX ORDER — MUPIROCIN 20 MG/G
OINTMENT TOPICAL 3 TIMES DAILY
COMMUNITY
Start: 2024-04-22

## 2024-05-09 RX ORDER — BIOTIN 1 MG
1 TABLET ORAL
COMMUNITY

## 2024-05-09 RX ORDER — LATANOPROSTENE BUNOD 0.24 MG/ML
SOLUTION/ DROPS OPHTHALMIC
COMMUNITY
Start: 2023-12-12

## 2024-05-09 NOTE — PROGRESS NOTES
Kellie Beckett is a 69 y.o. female  presents for right breast mass she noted about 2 weeks ago when doing a self breast exam.   Not tender and has had not change  Mmg in 2023 negative   No personal or family history of breast disease        LMP: No LMP recorded. Patient is postmenopausal.. .      Past Medical History:   Diagnosis Date    Anemia     iron and b12 deficiency    Glaucoma     Hypertension      Past Surgical History:   Procedure Laterality Date    EYE SURGERY   & ,2019    Glaucoma surgery in both eyes    GASTRIC BYPASS  2004    GLAUCOMA SURGERY      right eye     Social History     Socioeconomic History    Marital status:     Number of children: 0   Tobacco Use    Smoking status: Never    Smokeless tobacco: Never   Substance and Sexual Activity    Alcohol use: Never    Drug use: Never    Sexual activity: Not Currently     Partners: Male     Birth control/protection: Abstinence, None   Social History Narrative    Live alone     Social Determinants of Health     Financial Resource Strain: Low Risk  (2024)    Overall Financial Resource Strain (CARDIA)     Difficulty of Paying Living Expenses: Not hard at all   Food Insecurity: No Food Insecurity (2024)    Hunger Vital Sign     Worried About Running Out of Food in the Last Year: Never true     Ran Out of Food in the Last Year: Never true   Transportation Needs: No Transportation Needs (2024)    TRANSPORTATION NEEDS     Transportation : No   Physical Activity: Inactive (2024)    Exercise Vital Sign     Days of Exercise per Week: 0 days     Minutes of Exercise per Session: 0 min   Stress: No Stress Concern Present (2024)    [employer] - Occupational Stress Questionnaire     Feeling of Stress : Only a little   Housing Stability: Low Risk  (2024)    Housing Stability Vital Sign     Unable to Pay for Housing in the Last Year: No     Homeless in the Last Year: No     Family  "History   Problem Relation Name Age of Onset    Cataracts Mother Antonia Iglesias Valencia     Cancer Mother Antonia Valencia         Unknown    Early death Mother Antonia Valencia     Cataracts Father Mango Moore     Glaucoma Father Mango Moore     Diabetes Father Mango Moore     Heart disease Father Mango Moore     Hypertension Father Mango Moore     Cancer Father Mango Moore         Lung Cancer    Cancer Maternal Aunt          colon     OB History          1    Para        Term                AB   1    Living             SAB   1    IAB        Ectopic        Multiple        Live Births                     /82   Ht 5' 3" (1.6 m)   Wt 122.5 kg (270 lb 1 oz)   BMI 47.84 kg/m²       ROS:  Per hpi    PHYSICAL EXAM:  APPEARANCE: Well nourished, well developed, in no acute distress.  AFFECT: WNL, alert and oriented x 3  SKIN: No acne or hirsutism  BREASTS: Symmetrical, no skin changes or visible lesions. Has a small pea size palpable mass to right breast - most noted when pt sitting up at inner upper quadrant but almost at mid line, no nipple discharge bilaterally.    Physical Exam:        Pulmonary/Chest:                                1. Mass of upper inner quadrant of right breast  Mammo Digital Diagnostic Bilat with Fabián    US Breast Right Limited       AND PLAN:    Kellie Diaz" was seen today for breast problem.    Diagnoses and all orders for this visit:    Mass of upper inner quadrant of right breast  -     Mammo Digital Diagnostic Bilat with Fabián; Future  -     US Breast Right Limited; Future                      Answers submitted by the patient for this visit:  Mass Questionnaire (Submitted on 2024)  Chief Complaint: Pulmonary/Chest Tumor/Mass  Chronicity: new  Onset: 1 to 4 weeks ago  Frequency: constantly  Progression since onset: unchanged  abdominal pain: No  anorexia: Yes  change in bowel habit: No  chest pain: No  chills: " No  congestion: No  cough: No  diaphoresis: No  fatigue: No  fever: No  headaches: No  myalgias: No  nausea: No  neck pain: No  numbness: No  rash: No  swollen glands: No  urinary symptoms: No  vertigo: No  visual change: No  vomiting: No  weakness: No  Aggravated by: nothing  treatments tried: nothing  Improvement on treatment: no relief

## 2024-05-16 ENCOUNTER — HOSPITAL ENCOUNTER (OUTPATIENT)
Dept: RADIOLOGY | Facility: HOSPITAL | Age: 69
Discharge: HOME | End: 2024-05-16
Attending: NURSE PRACTITIONER
Payer: MEDICARE

## 2024-05-16 VITALS — WEIGHT: 268.94 LBS | HEIGHT: 63 IN | BODY MASS INDEX: 47.65 KG/M2

## 2024-05-16 DIAGNOSIS — N63.12 MASS OF UPPER INNER QUADRANT OF RIGHT BREAST: ICD-10-CM

## 2024-05-16 PROCEDURE — 77066 DX MAMMO INCL CAD BI: CPT | Mod: 26,,, | Performed by: RADIOLOGY

## 2024-05-16 PROCEDURE — 76642 ULTRASOUND BREAST LIMITED: CPT | Mod: 26,RT,, | Performed by: RADIOLOGY

## 2024-05-16 PROCEDURE — 77062 BREAST TOMOSYNTHESIS BI: CPT | Mod: 26,,, | Performed by: RADIOLOGY

## 2024-05-16 PROCEDURE — 77062 BREAST TOMOSYNTHESIS BI: CPT | Mod: TC

## 2024-05-16 PROCEDURE — 76642 ULTRASOUND BREAST LIMITED: CPT | Mod: TC,RT

## 2024-06-07 ENCOUNTER — PATIENT MESSAGE (OUTPATIENT)
Dept: PRIMARY CARE CLINIC | Facility: CLINIC | Age: 69
End: 2024-06-07
Payer: MEDICARE

## 2024-08-15 NOTE — TELEPHONE ENCOUNTER
Addended by: MARV LUIS on: 8/15/2024 09:47 AM     Modules accepted: Orders     Returned call to  clinic in reference to compression stockings for pt. States that pt called their clinic to cancel request.